# Patient Record
Sex: FEMALE | Race: WHITE | NOT HISPANIC OR LATINO | Employment: OTHER | URBAN - METROPOLITAN AREA
[De-identification: names, ages, dates, MRNs, and addresses within clinical notes are randomized per-mention and may not be internally consistent; named-entity substitution may affect disease eponyms.]

---

## 2017-08-17 ENCOUNTER — OFFICE VISIT (OUTPATIENT)
Dept: FAMILY MEDICINE | Facility: CLINIC | Age: 47
End: 2017-08-17
Payer: COMMERCIAL

## 2017-08-17 VITALS
TEMPERATURE: 98.1 F | WEIGHT: 140.7 LBS | BODY MASS INDEX: 21.32 KG/M2 | HEART RATE: 97 BPM | DIASTOLIC BLOOD PRESSURE: 87 MMHG | SYSTOLIC BLOOD PRESSURE: 141 MMHG | HEIGHT: 68 IN

## 2017-08-17 DIAGNOSIS — F41.8 SITUATIONAL ANXIETY: ICD-10-CM

## 2017-08-17 DIAGNOSIS — L70.9 ACNE, UNSPECIFIED ACNE TYPE: Primary | ICD-10-CM

## 2017-08-17 DIAGNOSIS — M25.511 ACUTE PAIN OF RIGHT SHOULDER: ICD-10-CM

## 2017-08-17 DIAGNOSIS — F43.21 GRIEF REACTION: ICD-10-CM

## 2017-08-17 DIAGNOSIS — B07.0 PLANTAR WARTS: ICD-10-CM

## 2017-08-17 DIAGNOSIS — N94.9 VAGINAL SYMPTOM: ICD-10-CM

## 2017-08-17 PROBLEM — F43.20 GRIEF REACTION: Status: ACTIVE | Noted: 2017-08-17

## 2017-08-17 LAB
SPECIMEN SOURCE: NORMAL
WET PREP SPEC: NORMAL

## 2017-08-17 PROCEDURE — 87210 SMEAR WET MOUNT SALINE/INK: CPT | Performed by: NURSE PRACTITIONER

## 2017-08-17 PROCEDURE — 17110 DESTRUCTION B9 LES UP TO 14: CPT | Performed by: NURSE PRACTITIONER

## 2017-08-17 PROCEDURE — 99215 OFFICE O/P EST HI 40 MIN: CPT | Mod: 25 | Performed by: NURSE PRACTITIONER

## 2017-08-17 RX ORDER — LORAZEPAM 0.5 MG/1
0.5 TABLET ORAL DAILY PRN
Qty: 15 TABLET | Refills: 0 | Status: SHIPPED | OUTPATIENT
Start: 2017-08-17 | End: 2018-03-06

## 2017-08-17 RX ORDER — TRETINOIN 0.25 MG/G
GEL TOPICAL
Qty: 45 G | Refills: 12 | Status: SHIPPED | OUTPATIENT
Start: 2017-08-17 | End: 2018-12-23

## 2017-08-17 ASSESSMENT — ANXIETY QUESTIONNAIRES
2. NOT BEING ABLE TO STOP OR CONTROL WORRYING: MORE THAN HALF THE DAYS
7. FEELING AFRAID AS IF SOMETHING AWFUL MIGHT HAPPEN: NEARLY EVERY DAY
5. BEING SO RESTLESS THAT IT IS HARD TO SIT STILL: SEVERAL DAYS
1. FEELING NERVOUS, ANXIOUS, OR ON EDGE: NEARLY EVERY DAY
6. BECOMING EASILY ANNOYED OR IRRITABLE: NOT AT ALL
GAD7 TOTAL SCORE: 11
3. WORRYING TOO MUCH ABOUT DIFFERENT THINGS: SEVERAL DAYS
IF YOU CHECKED OFF ANY PROBLEMS ON THIS QUESTIONNAIRE, HOW DIFFICULT HAVE THESE PROBLEMS MADE IT FOR YOU TO DO YOUR WORK, TAKE CARE OF THINGS AT HOME, OR GET ALONG WITH OTHER PEOPLE: SOMEWHAT DIFFICULT

## 2017-08-17 ASSESSMENT — PATIENT HEALTH QUESTIONNAIRE - PHQ9
5. POOR APPETITE OR OVEREATING: SEVERAL DAYS
SUM OF ALL RESPONSES TO PHQ QUESTIONS 1-9: 7

## 2017-08-17 NOTE — MR AVS SNAPSHOT
After Visit Summary   8/17/2017    Luna Rivera    MRN: [id number]           Patient Information     Date Of Birth          1970        Visit Information        Provider Department      8/17/2017 2:20 PM Catrina Jin, NP [facility]        Today's Diagnoses     Acne, unspecified acne type    -  1    Situational anxiety        Grief reaction        Vaginal symptom        Plantar warts        Acute pain of right shoulder          Care Instructions    Following treatment with liquid nitrogen - your skin may get a blister - try to keep this intact and keep the area clean and dry.  If not blister occurs, it is ok to use a pumice stone or nail file to gently file down the thickened warty tissue.   (do not use the same nail file that you use on your finger nails or it could spread the virus).   You may need to return for additional treatment if the wart does not completely resolve.  Plan to return after roughly 3 weeks once the area has healed, if still showing signs of persistent warty tissue.      May start using OTC treatments (Mediplast or Dr. Joseph) if treatment ineffective.    COURTNEY Curiel    Ativan as needed for anxiety - this medication is not intended for long term use.  It should be used as little as possible to keep symptoms controlled and it may be used occasionally at night to help with sleep.  There is a possiblity of dependency while taking this medication.  It will often be a good bridge to helping with symptoms while waiting for other long term medications to become effective.  We will hopefully need less of these as antidepressants or other long term medications become more effective.  Counseling can also be a good alternative, as they can teach relaxation techniques instead of the use of these medicatiosn.                    Generalized Anxiety Disorder  What is generalized anxiety disorder?   Generalized anxiety disorder (CARIDAD) is a condition in  "which a person worries excessively and unrealistically. They may also be jittery, restless, or dizzy. When these symptoms last for at least 6 months, a diagnosis of CARIDAD may be made.  CARIDAD may exist by itself, or with both anxiety and depression. It is estimated that almost 5% of people have had this disorder during their lives.  How does it occur?   The cause of CARIDAD is unknown. Genetic and environmental factors play a role. Women have CARIDAD about twice as often as men.  The worry in CARIDAD is not about panic attacks or being afraid in public places. It is typically \"free-floating\" anxiety out of proportion to any real life situation. The worrying can interfere with normal day-to-day activities and work or school.  What are the symptoms?   Symptoms include excessive, unrealistic, and uncontrollable worrying about many things such as:  the state of the world   the economy   violence in society   your job   the bills   chores   family members  Physical symptoms such as muscle tension, sleep problems, or feeling on edge usually go along with anxiety. A person may be short-tempered and unable to focus or concentrate because of the worrying. Other symptoms include sweating, shaking, having a very fast heartbeat, feeling out of breath, needing to go to the bathroom often and feeling like fainting. People with CARIDAD may be uneasy in a group or in a waiting room.  How is it diagnosed?   There is no lab test for CARIDAD. Your healthcare provider or therapist will ask about your symptoms. He or she will make sure you do not have a medical illness or drug or alcohol problem that could cause the symptoms. Some medicines can cause anxiety or make it worse. These include asthma medicines, stimulants, and steroids such as prednisone.  If you have had the symptoms for at least 6 months, if you have had to cut back on your activities, and if you find it difficult to get things done, you may be diagnosed with generalized anxiety disorder.  How is " it treated?   Different types of approaches have proven helpful in treating CARIDAD. These include medicine, behavior therapy, relaxation therapy, cognitive therapy, and stress management techniques. Which treatments your healthcare provider or therapist uses may depend upon how much the disorder interferes with your day-to-day life.  Several types of medicines can help treat CARIDAD. Your healthcare provider will work with you to carefully select the best one for you.  How long will the effects last?   CARIDAD can last many years and sometimes an entire lifetime.   How can I take care of myself?   Get support. Talk with family and friends. Consider joining a support group in your area. Go to a stress management class in your local community.   Learn to manage stress. Ask for help at home and work when the load is too great to handle. Find ways to relax, for example take up a hobby, listen to music, watch movies, take walks. Try deep breathing exercises when you feel stressed.   Take care of your physical health. Try to get at least 7 to 9 hours of sleep each night. Eat a healthy diet. Limit caffeine. If you smoke, quit. Avoid alcohol and drugs, because they can make your symptoms worse. Exercise according to your healthcare provider's instructions.   Check your medicines. To help prevent problems, tell your healthcare provider and pharmacist about all the medicines, natural remedies, vitamins, and other supplements that you take.   Contact your healthcare provider or therapist if you have any questions or your symptoms seem to be getting worse.  You may also want to contact [facility] (formerly the National Mental Health Association or NMHA). [facility]'s toll-free Information Center number is 9-603-242-[facility]. Its web site address is http://www.NMHA.org        Thank you for choosing [facility].  You may be receiving a survey in the mail from [employer] regarding your visit today.  Please take a few minutes to  "complete and return the survey to let us know how we are doing.      If you have questions or concerns, please contact us via [employer] or you can contact your care team at [phone].    Our Clinic hours are:  Monday 6:40 am  to 7:00 pm  Tuesday -Friday 6:40 am to 5:00 pm    The [place] outpatient lab hours are:  Monday - Friday 6:10 am to 4:45 pm  Saturdays 7:00 am to 11:00 am  Appointments are required, call [phone]    If you have clinical questions after hours or would like to schedule an appointment,  call the clinic at [phone].            Follow-ups after your visit        Additional Services     PHYSICAL THERAPY REFERRAL       *This therapy referral will be filtered to a centralized scheduling office at [facility] and the patient will receive a call to schedule an appointment at a [place] location most convenient for them. *     [facility] provides Physical Therapy evaluation and treatment and many specialty services across the [place] system.  If requesting a specialty program, please choose from the list below.    If you have not heard from the scheduling office within 2 business days, please call [phone] for all locations, with the exception of [place], please call [phone].  Treatment: Evaluation & Treatment  Special Instructions/Modalities: none  Special Programs: None    Please be aware that coverage of these services is subject to the terms and limitations of your health insurance plan.  Call member services at your health plan with any benefit or coverage questions.      **Note to Provider:  If you are referring outside of [place] for the therapy appointment, please list the name of the location in the \"special instructions\" above, print the referral and give to the patient to schedule the appointment.                  Who to contact     If you have questions or need follow up information about today's clinic visit or your schedule please " "contact [facility] directly at [phone].  Normal or non-critical lab and imaging results will be communicated to you by MyChart, letter or phone within 4 business days after the clinic has received the results. If you do not hear from us within 7 days, please contact the clinic through Fire[employer] or phone. If you have a critical or abnormal lab result, we will notify you by phone as soon as possible.  Submit refill requests through [employer] or call your pharmacy and they will forward the refill request to us. Please allow 3 business days for your refill to be completed.          Additional Information About Your Visit        [employer] Information     [employer] gives you secure access to your electronic health record. If you see a primary care provider, you can also send messages to your care team and make appointments. If you have questions, please call your primary care clinic.  If you do not have a primary care provider, please call [phone] and they will assist you.        Care EveryWhere ID     This is your Care EveryWhere ID. This could be used by other organizations to access your [place] medical records  [id number]        Your Vitals Were     Pulse Temperature Height Last Period Breastfeeding? BMI (Body Mass Index)    97 98.1  F (36.7  C) (Tympanic) 5' 7.5\" (1.715 m) 07/31/2017 No 21.71 kg/m2       Blood Pressure from Last 3 Encounters:   08/17/17 141/87   12/26/16 123/75   11/23/16 127/57    Weight from Last 3 Encounters:   08/17/17 140 lb 11.2 oz (63.8 kg)   12/26/16 140 lb (63.5 kg)   11/23/16 139 lb 6.4 oz (63.2 kg)              We Performed the Following     DESTRUCT BENIGN LESION, UP TO 14     PHYSICAL THERAPY REFERRAL     Wet prep          Today's Medication Changes          These changes are accurate as of: 8/17/17  3:09 PM.  If you have any questions, ask your nurse or doctor.               Start taking these medicines.        Dose/Directions    LORazepam 0.5 MG tablet   Commonly " known as:  ATIVAN   Used for:  Situational anxiety, Grief reaction        Dose:  0.5 mg   Take 1 tablet (0.5 mg) by mouth daily as needed for anxiety   Quantity:  15 tablet   Refills:  0         These medicines have changed or have updated prescriptions.        Dose/Directions    hydrocortisone 2.5 % cream   Commonly known as:  ANUSOL-HC   This may have changed:  additional instructions   Used for:  External hemorrhoids        Place rectally 2 times daily   Quantity:  28.35 g   Refills:  1       polyethylene glycol powder   Commonly known as:  MIRALAX   This may have changed:    - when to take this  - reasons to take this   Used for:  Chronic constipation        Dose:  1 capful   Take 17 g by mouth daily   Quantity:  510 g   Refills:  1            Where to get your medicines      These medications were sent to [facility] - [place], [place] - [address]  [address] [address]     Phone:  [phone]     tretinoin 0.025 % topical gel         Some of these will need a paper prescription and others can be bought over the counter.  Ask your nurse if you have questions.     Bring a paper prescription for each of these medications     LORazepam 0.5 MG tablet                Primary Care Provider Office Phone # Fax #    aCtrina Ioana Jin [phone] [address] [facility] [address]        Equal Access to Services     ANA PRIETO AH: Devin baxtero Soomaali, waaxda luqadaha, qaybta kaalmada adeegyada, thierry you. So [facility] [phone].    ATENCIÓN: Si habla español, tiene a lema disposición servicios gratuitos de asistencia lingüística. Llame al [phone].    We comply with applicable federal civil rights laws and [place] laws. We do not discriminate on the basis of race, color, national origin, age, disability sex, sexual orientation or gender identity.            Thank you!     Thank you for choosing [facility]  [place]  for your care. Our goal is always to provide you with excellent care. Hearing back from our patients is one way we can continue to improve our services. Please take a few minutes to complete the written survey that you may receive in the mail after your visit with us. Thank you!             Your Updated Medication List - Protect others around you: Learn how to safely use, store and throw away your medicines at www.disposemymeds.org.          This list is accurate as of: 8/17/17  3:09 PM.  Always use your most recent med list.                   Brand Name Dispense Instructions for use Diagnosis    acyclovir 400 MG tablet    ZOVIRAX    180 tablet    Take 1 tablet (400 mg) by mouth 2 times daily    Herpes simplex virus (HSV) infection       fluticasone 50 MCG/ACT spray    FLONASE    3 Bottle    Spray 2 sprays into both nostrils daily    Allergic rhinitis       hydrocortisone 2.5 % cream    ANUSOL-HC    28.35 g    Place rectally 2 times daily    External hemorrhoids       LORazepam 0.5 MG tablet    ATIVAN    15 tablet    Take 1 tablet (0.5 mg) by mouth daily as needed for anxiety    Situational anxiety, Grief reaction       polyethylene glycol powder    MIRALAX    510 g    Take 17 g by mouth daily    Chronic constipation       PRILOSEC PO      Take 20 mg by mouth daily as needed        tretinoin 0.025 % topical gel    RETIN-A    45 g    Apply  topically At Bedtime.    Acne, unspecified acne type

## 2017-08-17 NOTE — PATIENT INSTRUCTIONS
Following treatment with liquid nitrogen - your skin may get a blister - try to keep this intact and keep the area clean and dry.  If not blister occurs, it is ok to use a pumice stone or nail file to gently file down the thickened warty tissue.   (do not use the same nail file that you use on your finger nails or it could spread the virus).   You may need to return for additional treatment if the wart does not completely resolve.  Plan to return after roughly 3 weeks once the area has healed, if still showing signs of persistent warty tissue.      May start using OTC treatments (Mediplast or Dr. Joseph) if treatment ineffective.    COURTNEY Curiel    Ativan as needed for anxiety - this medication is not intended for long term use.  It should be used as little as possible to keep symptoms controlled and it may be used occasionally at night to help with sleep.  There is a possiblity of dependency while taking this medication.  It will often be a good bridge to helping with symptoms while waiting for other long term medications to become effective.  We will hopefully need less of these as antidepressants or other long term medications become more effective.  Counseling can also be a good alternative, as they can teach relaxation techniques instead of the use of these medicatiosn.                    Generalized Anxiety Disorder  What is generalized anxiety disorder?   Generalized anxiety disorder (CARIDAD) is a condition in which a person worries excessively and unrealistically. They may also be jittery, restless, or dizzy. When these symptoms last for at least 6 months, a diagnosis of CARIDAD may be made.  CARIDAD may exist by itself, or with both anxiety and depression. It is estimated that almost 5% of people have had this disorder during their lives.  How does it occur?   The cause of CARIDAD is unknown. Genetic and environmental factors play a role. Women have CARIDAD about twice as often as men.  The worry in CARIDAD is not about  "panic attacks or being afraid in public places. It is typically \"free-floating\" anxiety out of proportion to any real life situation. The worrying can interfere with normal day-to-day activities and work or school.  What are the symptoms?   Symptoms include excessive, unrealistic, and uncontrollable worrying about many things such as:  the state of the world   the economy   violence in society   your job   the bills   chores   family members  Physical symptoms such as muscle tension, sleep problems, or feeling on edge usually go along with anxiety. A person may be short-tempered and unable to focus or concentrate because of the worrying. Other symptoms include sweating, shaking, having a very fast heartbeat, feeling out of breath, needing to go to the bathroom often and feeling like fainting. People with CARIDAD may be uneasy in a group or in a waiting room.  How is it diagnosed?   There is no lab test for CARIDAD. Your healthcare provider or therapist will ask about your symptoms. He or she will make sure you do not have a medical illness or drug or alcohol problem that could cause the symptoms. Some medicines can cause anxiety or make it worse. These include asthma medicines, stimulants, and steroids such as prednisone.  If you have had the symptoms for at least 6 months, if you have had to cut back on your activities, and if you find it difficult to get things done, you may be diagnosed with generalized anxiety disorder.  How is it treated?   Different types of approaches have proven helpful in treating CARIDAD. These include medicine, behavior therapy, relaxation therapy, cognitive therapy, and stress management techniques. Which treatments your healthcare provider or therapist uses may depend upon how much the disorder interferes with your day-to-day life.  Several types of medicines can help treat CARIDAD. Your healthcare provider will work with you to carefully select the best one for you.  How long will the effects last? "   CARIDAD can last many years and sometimes an entire lifetime.   How can I take care of myself?   Get support. Talk with family and friends. Consider joining a support group in your area. Go to a stress management class in your local community.   Learn to manage stress. Ask for help at home and work when the load is too great to handle. Find ways to relax, for example take up a hobby, listen to music, watch movies, take walks. Try deep breathing exercises when you feel stressed.   Take care of your physical health. Try to get at least 7 to 9 hours of sleep each night. Eat a healthy diet. Limit caffeine. If you smoke, quit. Avoid alcohol and drugs, because they can make your symptoms worse. Exercise according to your healthcare provider's instructions.   Check your medicines. To help prevent problems, tell your healthcare provider and pharmacist about all the medicines, natural remedies, vitamins, and other supplements that you take.   Contact your healthcare provider or therapist if you have any questions or your symptoms seem to be getting worse.  You may also want to contact [facility] (formerly the National Mental Health Association or [facility]). [facility]'s toll-free Information Center number is 1-752-971-[facility]. Its web site address is [email or web address]        Thank you for choosing [facility].  You may be receiving a survey in the mail from [employer] regarding your visit today.  Please take a few minutes to complete and return the survey to let us know how we are doing.      If you have questions or concerns, please contact us via [employer] or you can contact your care team at [phone].    Our Clinic hours are:  Monday 6:40 am  to 7:00 pm  Tuesday -Friday 6:40 am to 5:00 pm    The [place] outpatient lab hours are:  Monday - Friday 6:10 am to 4:45 pm  Saturdays 7:00 am to 11:00 am  Appointments are required, call [phone]    If you have clinical questions after hours or would like to schedule an  appointment,  call the clinic at [phone].

## 2017-08-17 NOTE — NURSING NOTE
"Chief Complaint   Patient presents with     Anxiety     Vaginal Problem     painful intercourse     Medication Reconciliation     patient stopped taking Trazodone due to it causing dizziness and vomiting      Refill Request     Retin A gel      Wart     Plantars wart left foot        Initial /87 (BP Location: Left arm, Patient Position: Chair, Cuff Size: Adult Regular)  Pulse 97  Temp 98.1  F (36.7  C) (Tympanic)  Ht 5' 7.5\" (1.715 m)  Wt 140 lb 11.2 oz (63.8 kg)  LMP 07/31/2017  Breastfeeding? No  BMI 21.71 kg/m2 Estimated body mass index is 21.71 kg/(m^2) as calculated from the following:    Height as of this encounter: 5' 7.5\" (1.715 m).    Weight as of this encounter: 140 lb 11.2 oz (63.8 kg).  Medication Reconciliation: complete    "

## 2017-08-17 NOTE — PROGRESS NOTES
SUBJECTIVE:   Luna Rivera is a 46 year old female who presents to clinic today for the following health issues:     Chief Complaint   Patient presents with     Anxiety     Vaginal Problem     painful intercourse     Medication Reconciliation     patient stopped taking Trazodone due to it causing dizziness and vomiting      Refill Request     Retin A gel      Wart     Plantars wart left foot        Anxiety Follow-Up    Status since last visit: Worsened -situational    Other associated symptoms:issues with sleeping and concentration     Complicating factors:   Significant life event: Yes-Her cousin who was her best friend passed away . 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 Dealing with family issues    Current substance abuse: None  Depression symptoms: No  CARIDAD-7 SCORE 2011   Total Score 0   Some recent data might be hidden       GAD7  Cousin  recent and is executor of will and having family issues with cousin's family.  Lot's of panic episodes lately  History of anxiety - not on medications currently.    No suicidal thoughts.  No substance abuse.        Amount of exercise or physical activity: 6-7 days/week for an average of 15-30 minutes    Problems taking medications regularly: No    Medication side effects: stopped trazodone due to it causing dizziness and vomiting   Diet: regular (no restrictions)    Vaginal Symptoms  Onset: x 1 year gradually getting worse     Description:  Pain with intercourse. Patient had a very sore cyst on the opening of her vagina, is now moslty gone, but she wants this checked     Accompanying Signs & Symptoms:  None     History:   Sexually active: YES  New Partner: no   Possibility of Pregnancy:  No    Precipitating factors:   Recent Antibiotic Use: no     Alleviating factors:  None     Therapies Tried and outcome: none     Vaginal tenderness.  NO abdominal pain.    No vaginal discharge.    In monogamous relationship with .  No concerns for  "STDs        WART(S)      Onset: x 1 week     Description (location/number): Plantars wart bottom of Left foot     Accompanying signs and symptoms: Painful: no    History: prior warts: YES- when she was a child     Therapies tried and outcome: \"vinegar\" which has softened the wart up.         Problem list and histories reviewed & adjusted, as indicated.  Additional history: as documented    Patient Active Problem List   Diagnosis     Allergic rhinitis     Herpes simplex virus (HSV) infection     Hemorrhoids     Family history of breast cancer in mother     Menorrhagia     CARDIOVASCULAR SCREENING; LDL GOAL LESS THAN 160     Acne     External hemorrhoids     Hip impingement syndrome, unspecified laterality     Gastroesophageal reflux disease without esophagitis     Ptosis of eyelid, bilateral     Situational anxiety     Grief reaction     Past Surgical History:   Procedure Laterality Date     ARTHROSCOPY KNEE WITH MEDIAL MENISCECTOMY  2014    Procedure: ARTHROSCOPY KNEE WITH MEDIAL MENISCECTOMY;  Surgeon: Alejandro Dodge MD;  Location: [facility]       DELIVERY ONLY  ,      C LIGATE FALLOPIAN TUBE,POSTPARTUM       ESOPHAGOSCOPY, GASTROSCOPY, DUODENOSCOPY (EGD), COMBINED N/A 2016    Procedure: COMBINED ESOPHAGOSCOPY, GASTROSCOPY, DUODENOSCOPY (EGD), BIOPSY SINGLE OR MULTIPLE;  Surgeon: Jose Mora MD;  Location: [facility]      HYSTEROSCOPY, SURGICAL; W/ ENDOMETRIAL ABLATION, ANY METHOD  3/30/10    Novasure ablation       Social History   Substance Use Topics     Smoking status: Never Smoker     Smokeless tobacco: Never Used     Alcohol use Yes      Comment: rarely     Family History   Problem Relation Age of Onset     Breast Cancer Mother      age 46     CANCER Mother      Throat, larynx (d/t radiation from breast cancer)tongue cancer 2011     Hypertension Father      Neurologic Disorder Maternal Grandmother      Parkinson's     C.A.D. Maternal Grandmother      Thyroid Disease " Maternal Grandmother      hyperthyroid     Arthritis Maternal Grandfather      HEART DISEASE Maternal Grandfather      OSTEOPOROSIS Paternal Grandmother      HEART DISEASE Paternal Grandfather      CANCER Father      lung CA     Cancer - colorectal No family hx of      Prostate Cancer No family hx of      CEREBROVASCULAR DISEASE No family hx of      DIABETES No family hx of      Asthma No family hx of          Current Outpatient Prescriptions   Medication Sig Dispense Refill     tretinoin (RETIN-A) 0.025 % topical gel Apply  topically At Bedtime. 45 g 12     LORazepam (ATIVAN) 0.5 MG tablet Take 1 tablet (0.5 mg) by mouth daily as needed for anxiety 15 tablet 0     Omeprazole (PRILOSEC PO) Take 20 mg by mouth daily as needed        fluticasone (FLONASE) 50 MCG/ACT spray Spray 2 sprays into both nostrils daily 3 Bottle 1     acyclovir (ZOVIRAX) 400 MG tablet Take 1 tablet (400 mg) by mouth 2 times daily 180 tablet 2     hydrocortisone (ANUSOL-HC) 2.5 % rectal cream Place rectally 2 times daily (Patient taking differently: Place rectally 2 times daily As needed) 28.35 g 1     polyethylene glycol (MIRALAX) powder Take 17 g by mouth daily (Patient taking differently: Take 1 capful by mouth daily as needed ) 510 g 1     Allergies   Allergen Reactions     Compazine Fatigue     Neurological s/s-can't wake up     Ancef [Cefazolin Sodium] Hives     Recent Labs   Lab Test  10/24/16   1214  05/06/15   0858  05/22/14   1056  02/27/13   0945  01/04/11   1003   LDL   --    --   92  134*  114   HDL   --    --   69  63  59   TRIG   --    --   72  63  91   ALT   --   18   --    --    --    CR  0.84  0.84   --    --    --    GFRESTIMATED  73  73   --    --    --    GFRESTBLACK  88  88   --    --    --    POTASSIUM  4.1  3.9   --    --    --    TSH  1.71   --   2.09   --    --       BP Readings from Last 3 Encounters:   08/17/17 141/87   12/26/16 123/75   11/23/16 127/57    Wt Readings from Last 3 Encounters:   08/17/17 140 lb 11.2 oz  "(63.8 kg)   12/26/16 140 lb (63.5 kg)   11/23/16 139 lb 6.4 oz (63.2 kg)                  Labs reviewed in EPIC          Reviewed and updated as needed this visit by clinical staffAllergies  Meds       Reviewed and updated as needed this visit by Provider         ROS:  Constitutional, HEENT, cardiovascular, pulmonary, GI, , musculoskeletal, neuro, skin, endocrine and psych systems are negative, except as otherwise noted.  Reports right shoulder pain with certain movements.  No limits in ROM, weakness or numbness in UE.      OBJECTIVE:   /87 (BP Location: Left arm, Patient Position: Chair, Cuff Size: Adult Regular)  Pulse 97  Temp 98.1  F (36.7  C) (Tympanic)  Ht 5' 7.5\" (1.715 m)  Wt 140 lb 11.2 oz (63.8 kg)  LMP 07/31/2017  Breastfeeding? No  BMI 21.71 kg/m2  Body mass index is 21.71 kg/(m^2).  GENERAL: healthy, alert and no distress  NECK: no adenopathy, no asymmetry, masses, or scars and thyroid normal to palpation  RESP: lungs clear to auscultation - no rales, rhonchi or wheezes  CV: regular rate and rhythm, normal S1 S2, no S3 or S4, no murmur, click or rub, no peripheral edema and peripheral pulses strong  ABDOMEN: soft, nontender, no hepatosplenomegaly, no masses and bowel sounds normal   (female): normal female external genitalia, normal urethral meatus , vaginal mucosa pink, moist, well rugated, vaginal skin findings: small skin tag on lower vaginal opening - no ulcerations and with mild erythema at vaginal opening - no excoriation and normal cervix, adnexae, and uterus without masses.  MS: RUE exam shows normal strength and muscle mass, no deformities and no erythema, induration, or nodules  SKIN: After a complete discussion of the multiple treatment options for warts including the risks and bennefits of each, the patient has decided on treatment with Liquid nitrogen.    Each wart was frozen easily three times with liquid nitrogen. .  A total of 1 warts on left foot are treated today.  "     PSYCH: mentation appears normal, affect normal/bright, anxious, fatigued, judgement and insight intact and appearance well groomed    Diagnostic Test Results:  Results for orders placed or performed in visit on 08/17/17   Wet prep   Result Value Ref Range    Specimen Description Vagina     Wet Prep No Trichomonas seen     Wet Prep No clue cells seen     Wet Prep No yeast seen          ASSESSMENT/PLAN:     1. Situational anxiety   Discussed daily medication - patient would like to hold off for now and will follow up if anxiety not improving.   Discussed Ativan and uses - needs visit for refills.    - LORazepam (ATIVAN) 0.5 MG tablet; Take 1 tablet (0.5 mg) by mouth daily as needed for anxiety  Dispense: 15 tablet; Refill: 0    2. Grief reaction     - LORazepam (ATIVAN) 0.5 MG tablet; Take 1 tablet (0.5 mg) by mouth daily as needed for anxiety  Dispense: 15 tablet; Refill: 0    3. Acne, unspecified acne type     - tretinoin (RETIN-A) 0.025 % topical gel; Apply  topically At Bedtime.  Dispense: 45 g; Refill: 12    4. Vaginal symptom   Wet prep negative.  Recommend warm soaks.    - Wet prep    5. Plantar warts   LN applied X 3     - DESTRUCT BENIGN LESION, UP TO 14    6. Acute pain of right shoulder     - PHYSICAL THERAPY REFERRAL    Patient Instructions   Following treatment with liquid nitrogen - your skin may get a blister - try to keep this intact and keep the area clean and dry.  If not blister occurs, it is ok to use a pumice stone or nail file to gently file down the thickened warty tissue.   (do not use the same nail file that you use on your finger nails or it could spread the virus).   You may need to return for additional treatment if the wart does not completely resolve.  Plan to return after roughly 3 weeks once the area has healed, if still showing signs of persistent warty tissue.      May start using OTC treatments (Mediplast or Dr. Joseph) if treatment ineffective.    COURTNEY Curiel    Ativan  "as needed for anxiety - this medication is not intended for long term use.  It should be used as little as possible to keep symptoms controlled and it may be used occasionally at night to help with sleep.  There is a possiblity of dependency while taking this medication.  It will often be a good bridge to helping with symptoms while waiting for other long term medications to become effective.  We will hopefully need less of these as antidepressants or other long term medications become more effective.  Counseling can also be a good alternative, as they can teach relaxation techniques instead of the use of these medicatiosn.                    Generalized Anxiety Disorder  What is generalized anxiety disorder?   Generalized anxiety disorder (CARIDAD) is a condition in which a person worries excessively and unrealistically. They may also be jittery, restless, or dizzy. When these symptoms last for at least 6 months, a diagnosis of CARIDAD may be made.  CARIDAD may exist by itself, or with both anxiety and depression. It is estimated that almost 5% of people have had this disorder during their lives.  How does it occur?   The cause of CARIDAD is unknown. Genetic and environmental factors play a role. Women have CARIDAD about twice as often as men.  The worry in CARIDAD is not about panic attacks or being afraid in public places. It is typically \"free-floating\" anxiety out of proportion to any real life situation. The worrying can interfere with normal day-to-day activities and work or school.  What are the symptoms?   Symptoms include excessive, unrealistic, and uncontrollable worrying about many things such as:  the state of the world   the economy   violence in society   your job   the bills   chores   family members  Physical symptoms such as muscle tension, sleep problems, or feeling on edge usually go along with anxiety. A person may be short-tempered and unable to focus or concentrate because of the worrying. Other symptoms include " sweating, shaking, having a very fast heartbeat, feeling out of breath, needing to go to the bathroom often and feeling like fainting. People with CARIDAD may be uneasy in a group or in a waiting room.  How is it diagnosed?   There is no lab test for CARIDAD. Your healthcare provider or therapist will ask about your symptoms. He or she will make sure you do not have a medical illness or drug or alcohol problem that could cause the symptoms. Some medicines can cause anxiety or make it worse. These include asthma medicines, stimulants, and steroids such as prednisone.  If you have had the symptoms for at least 6 months, if you have had to cut back on your activities, and if you find it difficult to get things done, you may be diagnosed with generalized anxiety disorder.  How is it treated?   Different types of approaches have proven helpful in treating CARIDAD. These include medicine, behavior therapy, relaxation therapy, cognitive therapy, and stress management techniques. Which treatments your healthcare provider or therapist uses may depend upon how much the disorder interferes with your day-to-day life.  Several types of medicines can help treat CARIDAD. Your healthcare provider will work with you to carefully select the best one for you.  How long will the effects last?   CARIDAD can last many years and sometimes an entire lifetime.   How can I take care of myself?   Get support. Talk with family and friends. Consider joining a support group in your area. Go to a stress management class in your local community.   Learn to manage stress. Ask for help at home and work when the load is too great to handle. Find ways to relax, for example take up a hobby, listen to music, watch movies, take walks. Try deep breathing exercises when you feel stressed.   Take care of your physical health. Try to get at least 7 to 9 hours of sleep each night. Eat a healthy diet. Limit caffeine. If you smoke, quit. Avoid alcohol and drugs, because they can  make your symptoms worse. Exercise according to your healthcare provider's instructions.   Check your medicines. To help prevent problems, tell your healthcare provider and pharmacist about all the medicines, natural remedies, vitamins, and other supplements that you take.   Contact your healthcare provider or therapist if you have any questions or your symptoms seem to be getting worse.  You may also want to contact [facility] (formerly the National Mental Health Association or [facility]). [facility]'s toll-free Information Center number is 1-838-285-[facility]. Its web site address is [email or web address]        Thank you for choosing [facility].  You may be receiving a survey in the mail from [employer] regarding your visit today.  Please take a few minutes to complete and return the survey to let us know how we are doing.      If you have questions or concerns, please contact us via [employer] or you can contact your care team at [phone].    Our Clinic hours are:  Monday 6:40 am  to 7:00 pm  Tuesday -Friday 6:40 am to 5:00 pm    The [place] outpatient lab hours are:  Monday - Friday 6:10 am to 4:45 pm  Saturdays 7:00 am to 11:00 am  Appointments are required, call [phone]    If you have clinical questions after hours or would like to schedule an appointment,  call the clinic at [phone].        Catrina Jin NP  [facility]      Total times spent with patient 40 minutes of which > 50% of the time was spent counseling and coordination of care discussion of above symptoms, medication options, Ativan risks and SE, treatment options and follow up

## 2017-08-18 ASSESSMENT — ANXIETY QUESTIONNAIRES: GAD7 TOTAL SCORE: 11

## 2017-09-07 ENCOUNTER — HOSPITAL ENCOUNTER (OUTPATIENT)
Dept: PHYSICAL THERAPY | Facility: CLINIC | Age: 47
Setting detail: THERAPIES SERIES
End: 2017-09-07
Attending: NURSE PRACTITIONER
Payer: COMMERCIAL

## 2017-09-07 PROCEDURE — 97161 PT EVAL LOW COMPLEX 20 MIN: CPT | Mod: GP | Performed by: PHYSICAL THERAPIST

## 2017-09-07 PROCEDURE — 97110 THERAPEUTIC EXERCISES: CPT | Mod: GP | Performed by: PHYSICAL THERAPIST

## 2017-09-07 PROCEDURE — 97035 APP MDLTY 1+ULTRASOUND EA 15: CPT | Mod: GP | Performed by: PHYSICAL THERAPIST

## 2017-09-07 PROCEDURE — 40000718 ZZHC STATISTIC PT DEPARTMENT ORTHO VISIT: Performed by: PHYSICAL THERAPIST

## 2017-09-07 NOTE — PROGRESS NOTES
09/07/17 0900   General Information   Type of Visit Initial OP Ortho PT Evaluation   Start of Care Date 09/07/17   Referring Physician Catrina Jin    Patient/Family Goals Statement Decrease P, full movement and strength. Reaching up, Reaching across.    Orders Evaluate and Treat   Date of Order 08/17/17   Insurance Type [employer]   Insurance Comments/Visits Authorized Auth'd    Medical Diagnosis Acute P of R Shoulder    Surgical/Medical history reviewed (B Hip Surgery for Labral tears, bone scraping. L Meniscus, )   Precautions/Limitations no known precautions/limitations   General Information Comments Takes Tylenol, Advil as needed.    Body Part(s)   Body Part(s) Shoulder   Presentation and Etiology   Pertinent history of current problem (include personal factors and/or comorbidities that impact the POC) R Shoulder has been hurting for 6 months. Has been irritating it on/off. No specific injury. Last winter was walking dog and fell on ice really hard, thinks that she may have caught herself w/R arm. Is feeling better.    Impairments A. Pain;D. Decreased ROM;E. Decreased flexibility;F. Decreased strength and endurance   Functional Limitations perform required work activities;perform desired leisure / sports activities   Symptom Location P on top of shoulder w/ reaching up. Reaching across get P and tightness Post/Lat shoulder.    How/Where did it occur From insidious onset  (Possibley from a fall. )   Onset date of current episode/exacerbation 08/17/17  (MD Visit date. )   Chronicity Chronic   Pain rating (0-10 point scale) (1-5/10 )   Pain quality A. Sharp;B. Dull;C. Aching   Frequency of pain/symptoms C. With activity   Pain/symptoms are: (End of day )   Pain/symptoms exacerbated by C. Lifting;D. Carrying;H. Overhead reach;K. Home tasks   Pain/symptoms eased by C. Rest;G. Heat;H. Cold;I. OTC medication(s)   Progression of symptoms since onset: Improved   Prior Level of Function   Functional Level Prior  Comment Currently Independent w/ ADL's.    Current Level of Function   Current Community Support Family/friend caregiver   Patient role/employment history A. Employed   Employment Comments Computer work.    Living environment [place]   Fall Risk Screen   Fall screen completed by PT   Per patient - Fall 2 or more times in past year? No   Per patient - Fall with injury in past year? No   Is patient a fall risk? No   System Outcome Measures   Outcome Measures (SPADI 33.85% )   Functional Scales   Functional Scales Patient Specific Functional Scale   Patient Specific Functional Scale Q1:;Q2:;Q3:   Q1: Place an object on a high shelf 4/10    Q2: Carrying heavy object 4/10   Q3: Washing hair or back 3/10   Ankle/Foot Objective Findings   Posture Normal   Shoulder Objective Findings   Observation No acute distress.    Integumentary  Normal    Posture Good    Cervical Screen (ROM, quadrant) R Quadrant P top of R shoudler   Shoulder ROM Comment AROM R Flex 155 w/P, Abd 150 w/P, ER Normal, IR R T 12 w/P, L T3.    Scapulothoracic Rhythm Normal    Pec Minor (supine) Flexibility Tight B    Shoulder Flexibility Comments R UTrap and Lev tight.    Neer's Test + w/ Flex and Abd   Garcia-Sam Test Positive    Coracoid Test Negative    Bursa Test Negative    Phillips's Test Weak w/ Supination    Sulcus Test Painful.    Crossover Test Positive    Shoulder Special Tests Comments Bicipital Tendonitis. MMT: Abd at 90 4 w/ P, IR 5-.    Palpation R Biceps Tendon, Teres Major, Coracoid process, Slightly tender over R Pectorals.    Planned Therapy Interventions   Planned Therapy Interventions Comment STM, DTFM, Isometrics, progressing to Weights. ROM if needed.    Planned Modality Interventions   Planned Modality Interventions Comments Non thermal US, Iontophoresis prn, Infrafred.    Clinical Impression   Criteria for Skilled Therapeutic Interventions Met yes, treatment indicated   PT Diagnosis Possible SLAP, Biceps tendonitis,  Slight impingement.    Influenced by the following impairments Pain, Decreased Strength    Functional limitations due to impairments HH duties, Sports    Clinical Presentation Evolving/Changing   Clinical Presentation Rationale SPADI, ROM, MMT, Positive NEER's and K-H tests.    Clinical Decision Making (Complexity) Low complexity   Therapy Frequency 1 time/week   Predicted Duration of Therapy Intervention (days/wks) 1 month, then reassess, 6 visits.    Risk & Benefits of therapy have been explained Yes   Patient, Family & other staff in agreement with plan of care Yes   Clinical Impression Comments Possible SLAP, Biceps tendonitis, Slight impingement.    Education Assessment   Preferred Learning Style Listening;Demonstration;Pictures/video   Barriers to Learning No barriers   ORTHO GOALS   PT Ortho Eval Goals 1;2;3;4   Ortho Goal 1   Goal Identifier 1   Goal Description STG: Place an object on a high shelf 2/10    Target Date 10/18/17   Ortho Goal 2   Goal Identifier 2   Goal Description STG: Carrying heavy object 2/10   Target Date 10/18/17   Ortho Goal 3   Goal Identifier 3   Goal Description STG: Washing hair or back 1/10   Target Date 10/18/17   Ortho Goal 4   Goal Identifier 5   Goal Description LTG: Pt will be independnet w/ HEP to manage R shoulder weakness, P for better function and use of R U/E.   Target Date 11/10/17   Total Evaluation Time   Total Evaluation Time 60 Total (Eval x 40, Ex x 10, US x 10)    Cristiana Smith PT, [facility] (#[id number])  [facility]           [phone]  Fax          [phone]  Appt #      [phone]

## 2017-09-08 DIAGNOSIS — B00.9 HERPES SIMPLEX VIRUS (HSV) INFECTION: ICD-10-CM

## 2017-09-08 NOTE — TELEPHONE ENCOUNTER
Acyclovir     Last Written Prescription Date: 03/16/2016  Last Fill Quantity: 180, # refills: 2  Last Office Visit with [facility], P or [facility] prescribing provider: 08/17/2017        Creatinine   Date Value Ref Range Status   10/24/2016 0.84 0.52 - 1.04 mg/dL Final     Miles ASTORGA (R)

## 2017-09-13 ENCOUNTER — HOSPITAL ENCOUNTER (OUTPATIENT)
Dept: PHYSICAL THERAPY | Facility: CLINIC | Age: 47
Setting detail: THERAPIES SERIES
End: 2017-09-13
Attending: NURSE PRACTITIONER
Payer: COMMERCIAL

## 2017-09-13 PROCEDURE — 97035 APP MDLTY 1+ULTRASOUND EA 15: CPT | Mod: GP | Performed by: PHYSICAL THERAPIST

## 2017-09-13 PROCEDURE — 40000718 ZZHC STATISTIC PT DEPARTMENT ORTHO VISIT: Performed by: PHYSICAL THERAPIST

## 2017-09-13 PROCEDURE — 97110 THERAPEUTIC EXERCISES: CPT | Mod: GP | Performed by: PHYSICAL THERAPIST

## 2017-09-13 PROCEDURE — 97140 MANUAL THERAPY 1/> REGIONS: CPT | Mod: GP | Performed by: PHYSICAL THERAPIST

## 2017-09-15 RX ORDER — ACYCLOVIR 400 MG/1
TABLET ORAL
Qty: 180 TABLET | Refills: 0 | Status: SHIPPED | OUTPATIENT
Start: 2017-09-15 | End: 2018-03-22

## 2017-09-15 NOTE — TELEPHONE ENCOUNTER
Prescription approved per [facility] Refill Protocol.    Chantale Capellan RN

## 2017-09-20 ENCOUNTER — HOSPITAL ENCOUNTER (OUTPATIENT)
Dept: PHYSICAL THERAPY | Facility: CLINIC | Age: 47
Setting detail: THERAPIES SERIES
End: 2017-09-20
Attending: NURSE PRACTITIONER
Payer: COMMERCIAL

## 2017-09-20 PROCEDURE — 97035 APP MDLTY 1+ULTRASOUND EA 15: CPT | Mod: GP | Performed by: PHYSICAL THERAPIST

## 2017-09-20 PROCEDURE — 40000718 ZZHC STATISTIC PT DEPARTMENT ORTHO VISIT: Performed by: PHYSICAL THERAPIST

## 2017-09-20 PROCEDURE — 97140 MANUAL THERAPY 1/> REGIONS: CPT | Mod: GP | Performed by: PHYSICAL THERAPIST

## 2017-09-27 ENCOUNTER — HOSPITAL ENCOUNTER (OUTPATIENT)
Dept: PHYSICAL THERAPY | Facility: CLINIC | Age: 47
Setting detail: THERAPIES SERIES
End: 2017-09-27
Attending: NURSE PRACTITIONER
Payer: COMMERCIAL

## 2017-09-27 PROCEDURE — 97035 APP MDLTY 1+ULTRASOUND EA 15: CPT | Mod: GP | Performed by: PHYSICAL THERAPIST

## 2017-09-27 PROCEDURE — 40000718 ZZHC STATISTIC PT DEPARTMENT ORTHO VISIT: Performed by: PHYSICAL THERAPIST

## 2017-09-27 PROCEDURE — 97140 MANUAL THERAPY 1/> REGIONS: CPT | Mod: GP | Performed by: PHYSICAL THERAPIST

## 2017-10-04 ENCOUNTER — HOSPITAL ENCOUNTER (OUTPATIENT)
Dept: PHYSICAL THERAPY | Facility: CLINIC | Age: 47
Setting detail: THERAPIES SERIES
End: 2017-10-04
Attending: NURSE PRACTITIONER
Payer: COMMERCIAL

## 2017-10-04 PROCEDURE — 97140 MANUAL THERAPY 1/> REGIONS: CPT | Mod: GP | Performed by: PHYSICAL THERAPIST

## 2017-10-04 PROCEDURE — 40000718 ZZHC STATISTIC PT DEPARTMENT ORTHO VISIT: Performed by: PHYSICAL THERAPIST

## 2017-10-04 PROCEDURE — 97035 APP MDLTY 1+ULTRASOUND EA 15: CPT | Mod: GP | Performed by: PHYSICAL THERAPIST

## 2017-10-11 ENCOUNTER — HOSPITAL ENCOUNTER (OUTPATIENT)
Dept: PHYSICAL THERAPY | Facility: CLINIC | Age: 47
Setting detail: THERAPIES SERIES
End: 2017-10-11
Attending: NURSE PRACTITIONER
Payer: COMMERCIAL

## 2017-10-11 PROCEDURE — 97140 MANUAL THERAPY 1/> REGIONS: CPT | Mod: GP | Performed by: PHYSICAL THERAPIST

## 2017-10-11 PROCEDURE — 40000718 ZZHC STATISTIC PT DEPARTMENT ORTHO VISIT: Performed by: PHYSICAL THERAPIST

## 2017-10-11 PROCEDURE — 97110 THERAPEUTIC EXERCISES: CPT | Mod: GP | Performed by: PHYSICAL THERAPIST

## 2017-10-18 ENCOUNTER — HOSPITAL ENCOUNTER (OUTPATIENT)
Dept: PHYSICAL THERAPY | Facility: CLINIC | Age: 47
Setting detail: THERAPIES SERIES
End: 2017-10-18
Attending: NURSE PRACTITIONER
Payer: COMMERCIAL

## 2017-10-18 PROCEDURE — 97035 APP MDLTY 1+ULTRASOUND EA 15: CPT | Mod: GP | Performed by: PHYSICAL THERAPIST

## 2017-10-18 PROCEDURE — 40000718 ZZHC STATISTIC PT DEPARTMENT ORTHO VISIT: Performed by: PHYSICAL THERAPIST

## 2017-10-18 PROCEDURE — 97140 MANUAL THERAPY 1/> REGIONS: CPT | Mod: GP | Performed by: PHYSICAL THERAPIST

## 2017-10-18 NOTE — PROGRESS NOTES
Outpatient Physical Therapy Progress Note     Patient: Luna Rivera  : 1970    Beginning/End Dates of Reporting Period:   to 10/18/2017.  Total # of rx sessions: 7    Referring Provider: Catrina Jin Diagnosis: Acute P of R shoulder.      Client Self Report:  P Scale: 2-3/10, before the weekend. Shoulder sore from lifting heavier over the weekend. Before the weekend it was getting better. Realy notice it w/ reaching into back seat to get purse. Will need to carry heavier things now that traveling through airports, etc.  Going to be out of town, returning 17. Burning between shoulder blades today.     Objective Measurements:  Objective Measure: SPADI   Details: 10/18/17  23/.     INITIALLY: 33.85%     Objective Measure: AROM  Details: 10/18/17 Not measured d/t time constraints today.   INITIALLY: R Flex 155 w/ P, Abd 150 w/ P, IR T12 w/P, ER NOrmal     Objective Measure: MMT  Details: 10/18/17 Not measured d/t time constraints today.     INITIALLY:Abd at 90 4 w/P; IR 5-     Objective Measure: Special Tests  Details:  NOT checked today d/t time constraints.  INITIALLY: Bicip Tendonitis, NEER's w/ Flex and Abd, K-H, O-Alexander's Weak w/ Supination, Sulcus, Crossover.      Goals:  Goal Identifier 1   Goal Description STG: Place an object on a high shelf 2/10  10/18/17  Rated /10, not met.    Target Date 10/18/17   Date Met      Progress:     Goal Identifier 2   Goal Description STG: Carrying heavy object 2/10 10/18/17 Not trying to carry heavy things right now.    Target Date 10/18/17   Date Met      Progress:     Goal Identifier 3   Goal Description STG: Washing hair 1/10.  10/18/17 MET today.    Target Date 10/18/17   Date Met  10/18/17   Progress:     Goal Identifier 4   Goal Description LTG: Pt will be independnet w/ HEP to manage R shoulder weakness, P for better function and use of R U/E.   Target Date 11/10/17   Date Met      Progress:     Progress Toward Goals:    Progress this reporting period: Pt has met 1 STG.   Progress limited due to Increased activity over the weekend before reassessment.     Plan:  Continue therapy per current plan of care.  Changes to goals: New goal for washing back.     Discharge:  No  Cristiana Smith, PT, [facility] (#[id number])  [facility]           [phone]  Fax          [phone]  Appt #      [phone]

## 2017-12-07 ENCOUNTER — OFFICE VISIT (OUTPATIENT)
Dept: FAMILY MEDICINE | Facility: CLINIC | Age: 47
End: 2017-12-07
Payer: COMMERCIAL

## 2017-12-07 VITALS
BODY MASS INDEX: 21.82 KG/M2 | WEIGHT: 144 LBS | TEMPERATURE: 98.3 F | SYSTOLIC BLOOD PRESSURE: 112 MMHG | DIASTOLIC BLOOD PRESSURE: 74 MMHG | HEART RATE: 90 BPM | HEIGHT: 68 IN

## 2017-12-07 DIAGNOSIS — B07.0 PLANTAR WARTS: Primary | ICD-10-CM

## 2017-12-07 DIAGNOSIS — Z23 ENCOUNTER FOR IMMUNIZATION: ICD-10-CM

## 2017-12-07 PROCEDURE — 90715 TDAP VACCINE 7 YRS/> IM: CPT | Performed by: NURSE PRACTITIONER

## 2017-12-07 PROCEDURE — 17110 DESTRUCTION B9 LES UP TO 14: CPT | Performed by: NURSE PRACTITIONER

## 2017-12-07 PROCEDURE — 90471 IMMUNIZATION ADMIN: CPT | Performed by: NURSE PRACTITIONER

## 2017-12-07 NOTE — PROGRESS NOTES
SUBJECTIVE:   Luna Rivera is a 47 year old female who presents to clinic today for the following health issues:    WART(S)      Onset: ongoing since August     Description (location/number): She has a wart on the bottom of her left foot.     Accompanying signs and symptoms: Painful: YES    History: prior warts: YES    Therapies tried and outcome: She has tried OTC home freeze, she picks at the wart, Apple Cider Vinegar.     Reports pain in bottom of the foot with walking.  Has not had LN done on foot before.    Problem list and histories reviewed & adjusted, as indicated.  Additional history: as documented    Patient Active Problem List   Diagnosis     Allergic rhinitis     Herpes simplex virus (HSV) infection     Hemorrhoids     Family history of breast cancer in mother     Menorrhagia     CARDIOVASCULAR SCREENING; LDL GOAL LESS THAN 160     Acne     External hemorrhoids     Hip impingement syndrome, unspecified laterality     Gastroesophageal reflux disease without esophagitis     Ptosis of eyelid, bilateral     Situational anxiety     Grief reaction     Past Surgical History:   Procedure Laterality Date     ARTHROSCOPY KNEE WITH MEDIAL MENISCECTOMY  2014    Procedure: ARTHROSCOPY KNEE WITH MEDIAL MENISCECTOMY;  Surgeon: Alejandro Dodge MD;  Location: [facility]  DELIVERY ONLY  ,      C LIGATE FALLOPIAN TUBE,POSTPARTUM       ESOPHAGOSCOPY, GASTROSCOPY, DUODENOSCOPY (EGD), COMBINED N/A 2016    Procedure: COMBINED ESOPHAGOSCOPY, GASTROSCOPY, DUODENOSCOPY (EGD), BIOPSY SINGLE OR MULTIPLE;  Surgeon: Jose Mora MD;  Location: [facility] HYSTEROSCOPY, SURGICAL; W/ ENDOMETRIAL ABLATION, ANY METHOD  3/30/10    Novasure ablation       Social History   Substance Use Topics     Smoking status: Never Smoker     Smokeless tobacco: Never Used     Alcohol use Yes      Comment: rarely     Family History   Problem Relation Age of Onset     Breast Cancer Mother      age 46      CANCER Mother      Throat, larynx (d/t radiation from breast cancer)tongue cancer 9/2011     Hypertension Father      CANCER Father      lung CA     Neurologic Disorder Maternal Grandmother      Parkinson's     C.A.D. Maternal Grandmother      Thyroid Disease Maternal Grandmother      hyperthyroid     Arthritis Maternal Grandfather      HEART DISEASE Maternal Grandfather      OSTEOPOROSIS Paternal Grandmother      HEART DISEASE Paternal Grandfather      Cancer - colorectal No family hx of      Prostate Cancer No family hx of      CEREBROVASCULAR DISEASE No family hx of      DIABETES No family hx of      Asthma No family hx of          Current Outpatient Prescriptions   Medication Sig Dispense Refill     acyclovir (ZOVIRAX) 400 MG tablet TAKE ONE TABLET BY MOUTH TWICE DAILY 180 tablet 0     tretinoin (RETIN-A) 0.025 % topical gel Apply  topically At Bedtime. 45 g 12     LORazepam (ATIVAN) 0.5 MG tablet Take 1 tablet (0.5 mg) by mouth daily as needed for anxiety 15 tablet 0     Omeprazole (PRILOSEC PO) Take 20 mg by mouth daily as needed        fluticasone (FLONASE) 50 MCG/ACT spray Spray 2 sprays into both nostrils daily 3 Bottle 1     hydrocortisone (ANUSOL-HC) 2.5 % rectal cream Place rectally 2 times daily (Patient taking differently: Place rectally 2 times daily As needed) 28.35 g 1     polyethylene glycol (MIRALAX) powder Take 17 g by mouth daily (Patient taking differently: Take 1 capful by mouth daily as needed ) 510 g 1     Allergies   Allergen Reactions     Compazine Fatigue     Neurological s/s-can't wake up     Ancef [Cefazolin Sodium] Hives     Recent Labs   Lab Test  10/24/16   1214  05/06/15   0858  05/22/14   1056  02/27/13   0945  01/04/11   1003   LDL   --    --   92  134*  114   HDL   --    --   69  63  59   TRIG   --    --   72  63  91   ALT   --   18   --    --    --    CR  0.84  0.84   --    --    --    GFRESTIMATED  73  73   --    --    --    GFRESTBLACK  88  88   --    --    --    POTASSIUM   "4.1  3.9   --    --    --    TSH  1.71   --   2.09   --    --       BP Readings from Last 3 Encounters:   12/07/17 112/74   08/17/17 141/87   12/26/16 123/75    Wt Readings from Last 3 Encounters:   12/07/17 144 lb (65.3 kg)   08/17/17 140 lb 11.2 oz (63.8 kg)   12/26/16 140 lb (63.5 kg)                  Labs reviewed in EPIC          Reviewed and updated as needed this visit by clinical staff     Reviewed and updated as needed this visit by Provider         ROS:  Constitutional, HEENT, cardiovascular, pulmonary, GI, , musculoskeletal, neuro, skin, endocrine and psych systems are negative, except as otherwise noted.      OBJECTIVE:   /74  Pulse 90  Temp 98.3  F (36.8  C) (Tympanic)  Ht 5' 7.5\" (1.715 m)  Wt 144 lb (65.3 kg)  Breastfeeding? No  BMI 22.22 kg/m2  Body mass index is 22.22 kg/(m^2).  GENERAL: healthy, alert and no distress  SKIN: no suspicious lesions or rashes.  Left foot palmar region with warts present.  After a complete discussion of the multiple treatment options for warts including the risks and bennefits of each, the patient has decided on treatment with Liquid nitrogen.   Each wart was frozen easily three times with liquid nitrogen. .  A total of 1 warts are treated today.        Diagnostic Test Results:  none     ASSESSMENT/PLAN:     1. Plantar warts     Discussed treatment options today - patient consented to LN.  The risks, benefits and treatment options of prescribed medications or other treatments have been discussed with the patient. The patient verbalized their understanding and should call or follow up if no improvement or if they develop further problems.        - DESTRUCT BENIGN LESION, UP TO 14    Patient Instructions   Following treatment with liquid nitrogen - your skin may get a blister - try to keep this intact and keep the area clean and dry.  If not blister occurs, it is ok to use a pumice stone or nail file to gently file down the thickened warty tissue.   (do not " use the same nail file that you use on your finger nails or it could spread the virus).   You may need to return for additional treatment if the wart does not completely resolve.  Plan to return after roughly 3 weeks once the area has healed, if still showing signs of persistent warty tissue.      COURTNEY Curiel NP  [facility]

## 2017-12-07 NOTE — MR AVS SNAPSHOT
After Visit Summary   12/7/2017    Luna Rivera    MRN: [id number]           Patient Information     Date Of Birth          1970        Visit Information        Provider Department      12/7/2017 11:20 AM Catrina Jin NP [facility]        Today's Diagnoses     Plantar warts    -  1      Care Instructions    Following treatment with liquid nitrogen - your skin may get a blister - try to keep this intact and keep the area clean and dry.  If not blister occurs, it is ok to use a pumice stone or nail file to gently file down the thickened warty tissue.   (do not use the same nail file that you use on your finger nails or it could spread the virus).   You may need to return for additional treatment if the wart does not completely resolve.  Plan to return after roughly 3 weeks once the area has healed, if still showing signs of persistent warty tissue.      COURTNEY Curiel                Follow-ups after your visit        Who to contact     If you have questions or need follow up information about today's clinic visit or your schedule please contact [facility] directly at [phone].  Normal or non-critical lab and imaging results will be communicated to you by Samanagehart, letter or phone within 4 business days after the clinic has received the results. If you do not hear from us within 7 days, please contact the clinic through Samanagehart or phone. If you have a critical or abnormal lab result, we will notify you by phone as soon as possible.  Submit refill requests through Buzzero or call your pharmacy and they will forward the refill request to us. Please allow 3 business days for your refill to be completed.          Additional Information About Your Visit        MyChart Information     Buzzero gives you secure access to your electronic health record. If you see a primary care provider, you can also send messages to your care team and make appointments. If  "you have questions, please call your primary care clinic.  If you do not have a primary care provider, please call [phone] and they will assist you.        Care EveryWhere ID     This is your Care EveryWhere ID. This could be used by other organizations to access your [place] medical records  [id number]        Your Vitals Were     Pulse Temperature Height Breastfeeding? BMI (Body Mass Index)       90 98.3  F (36.8  C) (Tympanic) 5' 7.5\" (1.715 m) No 22.22 kg/m2        Blood Pressure from Last 3 Encounters:   12/07/17 112/74   08/17/17 141/87   12/26/16 123/75    Weight from Last 3 Encounters:   12/07/17 144 lb (65.3 kg)   08/17/17 140 lb 11.2 oz (63.8 kg)   12/26/16 140 lb (63.5 kg)              We Performed the Following     DESTRUCT BENIGN LESION, UP TO 14          Today's Medication Changes          These changes are accurate as of: 12/7/17 11:45 AM.  If you have any questions, ask your nurse or doctor.               These medicines have changed or have updated prescriptions.        Dose/Directions    hydrocortisone 2.5 % cream   Commonly known as:  ANUSOL-HC   This may have changed:  additional instructions   Used for:  External hemorrhoids        Place rectally 2 times daily   Quantity:  28.35 g   Refills:  1       polyethylene glycol powder   Commonly known as:  MIRALAX   This may have changed:    - when to take this  - reasons to take this   Used for:  Chronic constipation        Dose:  1 capful   Take 17 g by mouth daily   Quantity:  510 g   Refills:  1                Primary Care Provider Office Phone # Fax #    Catrina Jin [phone] [facility] [address]        Equal Access to Services     BERNIE PRIETO AH: Hadii lakshmi Pierce, wakittyda luqadaha, qaybta kaalmada qasim, thierry you. So [facility] [phone].    ATENCIÓN: Si habla español, tiene a lema disposición servicios gratuitos de asistencia lingüística. Llame al " [phone].    We comply with applicable federal civil rights laws and [place] laws. We do not discriminate on the basis of race, color, national origin, age, disability, sex, sexual orientation, or gender identity.            Thank you!     Thank you for choosing [facility]  for your care. Our goal is always to provide you with excellent care. Hearing back from our patients is one way we can continue to improve our services. Please take a few minutes to complete the written survey that you may receive in the mail after your visit with us. Thank you!             Your Updated Medication List - Protect others around you: Learn how to safely use, store and throw away your medicines at www.disposemymeds.org.          This list is accurate as of: 12/7/17 11:45 AM.  Always use your most recent med list.                   Brand Name Dispense Instructions for use Diagnosis    acyclovir 400 MG tablet    ZOVIRAX    180 tablet    TAKE ONE TABLET BY MOUTH TWICE DAILY    Herpes simplex virus (HSV) infection       fluticasone 50 MCG/ACT spray    FLONASE    3 Bottle    Spray 2 sprays into both nostrils daily    Allergic rhinitis       hydrocortisone 2.5 % cream    ANUSOL-HC    28.35 g    Place rectally 2 times daily    External hemorrhoids       LORazepam 0.5 MG tablet    ATIVAN    15 tablet    Take 1 tablet (0.5 mg) by mouth daily as needed for anxiety    Situational anxiety, Grief reaction       polyethylene glycol powder    MIRALAX    510 g    Take 17 g by mouth daily    Chronic constipation       PRILOSEC PO      Take 20 mg by mouth daily as needed        tretinoin 0.025 % topical gel    RETIN-A    45 g    Apply  topically At Bedtime.    Acne, unspecified acne type

## 2017-12-07 NOTE — NURSING NOTE
"Initial /74  Pulse 90  Temp 98.3  F (36.8  C) (Tympanic)  Ht 5' 7.5\" (1.715 m)  Wt 144 lb (65.3 kg)  Breastfeeding? No  BMI 22.22 kg/m2 Estimated body mass index is 22.22 kg/(m^2) as calculated from the following:    Height as of this encounter: 5' 7.5\" (1.715 m).    Weight as of this encounter: 144 lb (65.3 kg). .    Luly Louise, ABI ([facility])  "

## 2017-12-07 NOTE — PATIENT INSTRUCTIONS
Following treatment with liquid nitrogen - your skin may get a blister - try to keep this intact and keep the area clean and dry.  If not blister occurs, it is ok to use a pumice stone or nail file to gently file down the thickened warty tissue.   (do not use the same nail file that you use on your finger nails or it could spread the virus).   You may need to return for additional treatment if the wart does not completely resolve.  Plan to return after roughly 3 weeks once the area has healed, if still showing signs of persistent warty tissue.      Catrina Jin, COURTNEY

## 2017-12-19 ENCOUNTER — OFFICE VISIT (OUTPATIENT)
Dept: FAMILY MEDICINE | Facility: CLINIC | Age: 47
End: 2017-12-19
Payer: COMMERCIAL

## 2017-12-19 VITALS
BODY MASS INDEX: 22.38 KG/M2 | WEIGHT: 145 LBS | HEART RATE: 87 BPM | SYSTOLIC BLOOD PRESSURE: 111 MMHG | DIASTOLIC BLOOD PRESSURE: 74 MMHG

## 2017-12-19 DIAGNOSIS — B07.0 PLANTAR WARTS: Primary | ICD-10-CM

## 2017-12-19 DIAGNOSIS — B07.8 COMMON WART: ICD-10-CM

## 2017-12-19 PROCEDURE — 17110 DESTRUCTION B9 LES UP TO 14: CPT | Performed by: NURSE PRACTITIONER

## 2017-12-19 NOTE — NURSING NOTE
"Chief Complaint   Patient presents with     Wart     follow up left foot. Treated once in the past 2 weeks ago.        Initial /74 (BP Location: Right arm, Patient Position: Chair, Cuff Size: Adult Regular)  Pulse 87  Wt 145 lb (65.8 kg)  BMI 22.38 kg/m2 Estimated body mass index is 22.38 kg/(m^2) as calculated from the following:    Height as of 12/7/17: 5' 7.5\" (1.715 m).    Weight as of this encounter: 145 lb (65.8 kg).  Medication Reconciliation: complete   Moni Spears CMA    "

## 2017-12-19 NOTE — MR AVS SNAPSHOT
After Visit Summary   12/19/2017    Luna Rivera    MRN: [id number]           Patient Information     Date Of Birth          1970        Visit Information        Provider Department      12/19/2017 3:40 PM Catrina Jin NP [facility]        Care Instructions    Following treatment with liquid nitrogen - your skin may get a blister - try to keep this intact and keep the area clean and dry.  If not blister occurs, it is ok to use a pumice stone or nail file to gently file down the thickened warty tissue.   (do not use the same nail file that you use on your finger nails or it could spread the virus).   You may need to return for additional treatment if the wart does not completely resolve.  Plan to return after roughly 3 weeks once the area has healed, if still showing signs of persistent warty tissue.      May start using OTC treatments (Mediplast or Dr. Joseph) if treatment ineffective.      COURTNEY Curiel            Follow-ups after your visit        Your next 10 appointments already scheduled     Dec 20, 2017  2:00 PM CST   Ortho Treatment with Cristiana Smith PT   [facility] ([facility])    [address] [address] [phone]              Who to contact     If you have questions or need follow up information about today's clinic visit or your schedule please contact [facility] directly at [phone].  Normal or non-critical lab and imaging results will be communicated to you by MyChart, letter or phone within 4 business days after the clinic has received the results. If you do not hear from us within 7 days, please contact the clinic through MyChart or phone. If you have a critical or abnormal lab result, we will notify you by phone as soon as possible.  Submit refill requests through Metacafe or call your pharmacy and they will forward the refill request to us. Please allow 3  business days for your refill to be completed.          Additional Information About Your Visit        MyChart Information     LiveRamphart gives you secure access to your electronic health record. If you see a primary care provider, you can also send messages to your care team and make appointments. If you have questions, please call your primary care clinic.  If you do not have a primary care provider, please call [phone] and they will assist you.        Care EveryWhere ID     This is your Care EveryWhere ID. This could be used by other organizations to access your [place] medical records  [id number]        Your Vitals Were     Pulse BMI (Body Mass Index)                87 22.38 kg/m2           Blood Pressure from Last 3 Encounters:   12/19/17 111/74   12/07/17 112/74   08/17/17 141/87    Weight from Last 3 Encounters:   12/19/17 145 lb (65.8 kg)   12/07/17 144 lb (65.3 kg)   08/17/17 140 lb 11.2 oz (63.8 kg)              Today, you had the following     No orders found for display         Today's Medication Changes          These changes are accurate as of: 12/19/17  4:07 PM.  If you have any questions, ask your nurse or doctor.               These medicines have changed or have updated prescriptions.        Dose/Directions    hydrocortisone 2.5 % cream   Commonly known as:  ANUSOL-HC   This may have changed:  additional instructions   Used for:  External hemorrhoids        Place rectally 2 times daily   Quantity:  28.35 g   Refills:  1       polyethylene glycol powder   Commonly known as:  MIRALAX   This may have changed:    - when to take this  - reasons to take this   Used for:  Chronic constipation        Dose:  1 capful   Take 17 g by mouth daily   Quantity:  510 g   Refills:  1                Primary Care Provider Office Phone # Fax #    Catrina Jin [phone] [address] [facility] [address]        Equal Access to Services     ANA PRIETO AH: Devin hernandes  Kari, marda luqadaha, qajaredta kaja tripp, thierry avery krunaldino laMadisonclotilde avelino. So [facility] [phone].    ATENCIÓN: Si sahara pimentel, tiene a lema disposición servicios gratuitos de asistencia lingüística. Fahad al [phone].    We comply with applicable federal civil rights laws and [place] laws. We do not discriminate on the basis of race, color, national origin, age, disability, sex, sexual orientation, or gender identity.            Thank you!     Thank you for choosing [facility]  for your care. Our goal is always to provide you with excellent care. Hearing back from our patients is one way we can continue to improve our services. Please take a few minutes to complete the written survey that you may receive in the mail after your visit with us. Thank you!             Your Updated Medication List - Protect others around you: Learn how to safely use, store and throw away your medicines at www.disposemymeds.org.          This list is accurate as of: 12/19/17  4:07 PM.  Always use your most recent med list.                   Brand Name Dispense Instructions for use Diagnosis    acyclovir 400 MG tablet    ZOVIRAX    180 tablet    TAKE ONE TABLET BY MOUTH TWICE DAILY    Herpes simplex virus (HSV) infection       fluticasone 50 MCG/ACT spray    FLONASE    3 Bottle    Spray 2 sprays into both nostrils daily    Allergic rhinitis       hydrocortisone 2.5 % cream    ANUSOL-HC    28.35 g    Place rectally 2 times daily    External hemorrhoids       LORazepam 0.5 MG tablet    ATIVAN    15 tablet    Take 1 tablet (0.5 mg) by mouth daily as needed for anxiety    Situational anxiety, Grief reaction       polyethylene glycol powder    MIRALAX    510 g    Take 17 g by mouth daily    Chronic constipation       PRILOSEC PO      Take 20 mg by mouth daily as needed        tretinoin 0.025 % topical gel    RETIN-A    45 g    Apply  topically At Bedtime.    Acne, unspecified acne type

## 2017-12-19 NOTE — PROGRESS NOTES
SUBJECTIVE:   Luna Rivera is a 47 year old female who presents to clinic today for the following health issues:    Chief Complaint   Patient presents with     Wart     follow up left foot. Treated once in the past 2 weeks ago.     Also would like 2 additional warts that were treated last time.    Reports all have improved since last treatment.    Problem list and histories reviewed & adjusted, as indicated.  Additional history: as documented    Patient Active Problem List   Diagnosis     Allergic rhinitis     Herpes simplex virus (HSV) infection     Hemorrhoids     Family history of breast cancer in mother     Menorrhagia     CARDIOVASCULAR SCREENING; LDL GOAL LESS THAN 160     Acne     External hemorrhoids     Hip impingement syndrome, unspecified laterality     Gastroesophageal reflux disease without esophagitis     Ptosis of eyelid, bilateral     Situational anxiety     Grief reaction     Past Surgical History:   Procedure Laterality Date     ARTHROSCOPY KNEE WITH MEDIAL MENISCECTOMY  2014    Procedure: ARTHROSCOPY KNEE WITH MEDIAL MENISCECTOMY;  Surgeon: Alejandro Dodge MD;  Location: [facility]       DELIVERY ONLY  ,      C LIGATE FALLOPIAN TUBE,POSTPARTUM       ESOPHAGOSCOPY, GASTROSCOPY, DUODENOSCOPY (EGD), COMBINED N/A 2016    Procedure: COMBINED ESOPHAGOSCOPY, GASTROSCOPY, DUODENOSCOPY (EGD), BIOPSY SINGLE OR MULTIPLE;  Surgeon: Jose Mora MD;  Location: [facility]     HC HYSTEROSCOPY, SURGICAL; W/ ENDOMETRIAL ABLATION, ANY METHOD  3/30/10    Novasure ablation       Social History   Substance Use Topics     Smoking status: Never Smoker     Smokeless tobacco: Never Used     Alcohol use Yes      Comment: rarely     Family History   Problem Relation Age of Onset     Breast Cancer Mother      age 46     CANCER Mother      Throat, larynx (d/t radiation from breast cancer)tongue cancer 2011     Hypertension Father      CANCER Father      lung CA     Neurologic Disorder  Maternal Grandmother      Parkinson's     C.A.D. Maternal Grandmother      Thyroid Disease Maternal Grandmother      hyperthyroid     Arthritis Maternal Grandfather      HEART DISEASE Maternal Grandfather      OSTEOPOROSIS Paternal Grandmother      HEART DISEASE Paternal Grandfather      Cancer - colorectal No family hx of      Prostate Cancer No family hx of      CEREBROVASCULAR DISEASE No family hx of      DIABETES No family hx of      Asthma No family hx of          Current Outpatient Prescriptions   Medication Sig Dispense Refill     acyclovir (ZOVIRAX) 400 MG tablet TAKE ONE TABLET BY MOUTH TWICE DAILY 180 tablet 0     tretinoin (RETIN-A) 0.025 % topical gel Apply  topically At Bedtime. 45 g 12     LORazepam (ATIVAN) 0.5 MG tablet Take 1 tablet (0.5 mg) by mouth daily as needed for anxiety 15 tablet 0     Omeprazole (PRILOSEC PO) Take 20 mg by mouth daily as needed        fluticasone (FLONASE) 50 MCG/ACT spray Spray 2 sprays into both nostrils daily 3 Bottle 1     hydrocortisone (ANUSOL-HC) 2.5 % rectal cream Place rectally 2 times daily (Patient taking differently: Place rectally 2 times daily As needed) 28.35 g 1     polyethylene glycol (MIRALAX) powder Take 17 g by mouth daily (Patient taking differently: Take 1 capful by mouth daily as needed ) 510 g 1     Allergies   Allergen Reactions     Compazine Fatigue     Neurological s/s-can't wake up     Ancef [Cefazolin Sodium] Hives     Recent Labs   Lab Test  10/24/16   1214  05/06/15   0858  05/22/14   1056  02/27/13   0945  01/04/11   1003   LDL   --    --   92  134*  114   HDL   --    --   69  63  59   TRIG   --    --   72  63  91   ALT   --   18   --    --    --    CR  0.84  0.84   --    --    --    GFRESTIMATED  73  73   --    --    --    GFRESTBLACK  88  88   --    --    --    POTASSIUM  4.1  3.9   --    --    --    TSH  1.71   --   2.09   --    --       BP Readings from Last 3 Encounters:   12/19/17 111/74   12/07/17 112/74   08/17/17 141/87    Wt  Readings from Last 3 Encounters:   12/19/17 145 lb (65.8 kg)   12/07/17 144 lb (65.3 kg)   08/17/17 140 lb 11.2 oz (63.8 kg)                  Labs reviewed in EPIC          Reviewed and updated as needed this visit by clinical staffTobacco  Allergies  Med Hx  Surg Hx  Fam Hx  Soc Hx      Reviewed and updated as needed this visit by Provider         ROS:  Constitutional, HEENT, cardiovascular, pulmonary, GI, , musculoskeletal, neuro, skin, endocrine and psych systems are negative, except as otherwise noted.      OBJECTIVE:   /74 (BP Location: Right arm, Patient Position: Chair, Cuff Size: Adult Regular)  Pulse 87  Wt 145 lb (65.8 kg)  BMI 22.38 kg/m2  Body mass index is 22.38 kg/(m^2).  GENERAL: healthy, alert and no distress  SKIN: Left foot with small flat palmar wart, no callus formation, right hand with small 1 mm wart, left hand knuckle with flat 1 mm wart.  After a complete discussion of the multiple treatment options for warts including the risks and bennefits of each, the patient has decided on treatment with Liquid nitrogen.     Each wart was frozen easily three times with liquid nitrogen. .  A total of 3 warts are treated today.        Diagnostic Test Results:  none     ASSESSMENT/PLAN:     1. Plantar warts     - DESTRUCT BENIGN LESION, UP TO 14    2. Common wart     - DESTRUCT BENIGN LESION, UP TO 14    Patient Instructions   Following treatment with liquid nitrogen - your skin may get a blister - try to keep this intact and keep the area clean and dry.  If not blister occurs, it is ok to use a pumice stone or nail file to gently file down the thickened warty tissue.   (do not use the same nail file that you use on your finger nails or it could spread the virus).   You may need to return for additional treatment if the wart does not completely resolve.  Plan to return after roughly 3 weeks once the area has healed, if still showing signs of persistent warty tissue.      May start using OTC  treatments (Deirdre or Dr. Joseph) if treatment ineffective.      COURTNEY Curiel NP  [facility]

## 2017-12-19 NOTE — PATIENT INSTRUCTIONS
Following treatment with liquid nitrogen - your skin may get a blister - try to keep this intact and keep the area clean and dry.  If not blister occurs, it is ok to use a pumice stone or nail file to gently file down the thickened warty tissue.   (do not use the same nail file that you use on your finger nails or it could spread the virus).   You may need to return for additional treatment if the wart does not completely resolve.  Plan to return after roughly 3 weeks once the area has healed, if still showing signs of persistent warty tissue.      May start using OTC treatments (Mediplast or Dr. Joseph) if treatment ineffective.      COURTNEY Curiel

## 2017-12-20 ENCOUNTER — HOSPITAL ENCOUNTER (OUTPATIENT)
Dept: PHYSICAL THERAPY | Facility: CLINIC | Age: 47
Setting detail: THERAPIES SERIES
End: 2017-12-20
Attending: NURSE PRACTITIONER
Payer: COMMERCIAL

## 2017-12-20 PROCEDURE — 40000718 ZZHC STATISTIC PT DEPARTMENT ORTHO VISIT: Performed by: PHYSICAL THERAPIST

## 2017-12-20 PROCEDURE — 97140 MANUAL THERAPY 1/> REGIONS: CPT | Mod: GP | Performed by: PHYSICAL THERAPIST

## 2017-12-20 PROCEDURE — 97035 APP MDLTY 1+ULTRASOUND EA 15: CPT | Mod: GP | Performed by: PHYSICAL THERAPIST

## 2017-12-20 NOTE — PROGRESS NOTES
"Outpatient Physical Therapy Progress Note     Patient: Lnua Rivera  : 1970    Beginning/End Dates of Reporting Period:  17 to 2017.    Referring Provider: Catrina Jin Diagnosis:  Acute P in R shoulder      Client Self Report:  About 8 days ago, was getting into the back seat of  her husbands car and clipped head on door, so head was forced to the left and since then the Right shoulder has been worse again and the left neck is also bothering. Neck went into spasms and saw stars.     Objective Measurements:  Objective Measure: SPADI   Details: 17  Score 20.77    10/18/17  23/85.     INITIALLY: 33.85%     Objective Measure: AROM  Details:  17 R Flex 132, Abd 142 w/ Pinch at end range. IR T12 w/P and stiffness of R shoulder. ER Normal. INITIALLY: R Flex 155 w/ P, Abd 150 w/ P, IR T12 w/P, ER NOrmal     Objective Measure: MMT  Details: 17 Abd at 90 5-, IR 5 and No P.      INITIALLY:Abd at 90 4 w/P; IR 5-     Objective Measure: Special Tests  Details: 17 Bicipital Tendonitis +, NEER's w/ Flex and Abd, Crossover.    INITIALLY:  Bicip Tendonitis, NEER's w/ Flex and Abd, K-H, O-Alexander's Weak w/ Supination, Sulcus, Crossover.     Objective Measure: CROM:   Details: 17 Flex 1 1/2\" and Stiff, Ext 25% w/ P mid to L C Spine. SB R 35, L 33 w/ P L CSpine;  Rot R 55, L 55 w/ P just to the L of mid spine.     Objective Measure: Spec Tests/Palpation   Details: Downglide R C3 to C6, Upglide R C4 to C6. Tight R 1st Rib.  B Lev, L>R UTrap. Tight L Scalene.     Goals:  Goal Identifier 1   Goal Description STG: Place an object on a high shelf 2/10  12/20/17  Rated 5/10, NOT MET    Target Date 10/18/17   Date Met      Progress:     Goal Identifier 2   Goal Description STG: Carrying heavy object 2/10 12/20/17 3/10 on Functional Scale. NOT MET    Target Date 10/18/17   Date Met      Progress:     Goal Identifier 3   Goal Description STG: Washing hair 1/10.  17 MET - " Rated 0/10     Target Date 10/18/17   Date Met  10/18/17   Progress:     Goal Identifier 4   Goal Description LTG: Pt will be independnet w/ HEP to manage R shoulder weakness, P for better function and use of R U/E.   Target Date 11/10/17   Date Met      Progress:     Progress Toward Goals:   Progress this reporting period: Pt has met 1/3 STG's;.   Progress limited due to Recent exacerbation from hitting head on car door, and sx's not resolving on own.     Plan:  Continue therapy per current plan of care.  Changes to therapy plan of care: Neck Treatment included d/t recent neck injury.     Discharge:  No  Cristiana Smith, PT, [facility] (#[id number])  [facility]           [phone]  Fax          [phone]  Appt #      [phone]

## 2017-12-28 ENCOUNTER — HOSPITAL ENCOUNTER (OUTPATIENT)
Dept: PHYSICAL THERAPY | Facility: CLINIC | Age: 47
Setting detail: THERAPIES SERIES
End: 2017-12-28
Attending: NURSE PRACTITIONER
Payer: COMMERCIAL

## 2017-12-28 PROCEDURE — 40000718 ZZHC STATISTIC PT DEPARTMENT ORTHO VISIT: Performed by: PHYSICAL THERAPIST

## 2017-12-28 PROCEDURE — 97012 MECHANICAL TRACTION THERAPY: CPT | Mod: GP | Performed by: PHYSICAL THERAPIST

## 2017-12-28 PROCEDURE — 97035 APP MDLTY 1+ULTRASOUND EA 15: CPT | Mod: GP | Performed by: PHYSICAL THERAPIST

## 2017-12-28 NOTE — ADDENDUM NOTE
Encounter addended by: Cristiana Smith, PT on: 12/27/2017  6:42 PM<BR>     Actions taken: Flowsheet accepted

## 2018-01-19 DIAGNOSIS — B07.0 PLANTAR WARTS: Primary | ICD-10-CM

## 2018-03-06 ENCOUNTER — APPOINTMENT (OUTPATIENT)
Dept: CT IMAGING | Facility: CLINIC | Age: 48
End: 2018-03-06
Attending: EMERGENCY MEDICINE
Payer: COMMERCIAL

## 2018-03-06 ENCOUNTER — APPOINTMENT (OUTPATIENT)
Dept: ULTRASOUND IMAGING | Facility: CLINIC | Age: 48
End: 2018-03-06
Attending: EMERGENCY MEDICINE
Payer: COMMERCIAL

## 2018-03-06 ENCOUNTER — HOSPITAL ENCOUNTER (EMERGENCY)
Facility: CLINIC | Age: 48
Discharge: HOME | End: 2018-03-06
Attending: EMERGENCY MEDICINE | Admitting: EMERGENCY MEDICINE
Payer: COMMERCIAL

## 2018-03-06 ENCOUNTER — NURSE TRIAGE (OUTPATIENT)
Dept: NURSING | Facility: CLINIC | Age: 48
End: 2018-03-06

## 2018-03-06 VITALS
WEIGHT: 145 LBS | DIASTOLIC BLOOD PRESSURE: 78 MMHG | BODY MASS INDEX: 21.98 KG/M2 | SYSTOLIC BLOOD PRESSURE: 132 MMHG | TEMPERATURE: 98.2 F | HEART RATE: 115 BPM | RESPIRATION RATE: 18 BRPM | OXYGEN SATURATION: 98 % | HEIGHT: 68 IN

## 2018-03-06 DIAGNOSIS — R10.30 LOWER ABDOMINAL PAIN: ICD-10-CM

## 2018-03-06 DIAGNOSIS — D25.9 UTERINE LEIOMYOMA, UNSPECIFIED LOCATION: ICD-10-CM

## 2018-03-06 LAB
ALBUMIN UR-MCNC: NEGATIVE MG/DL
ANION GAP SERPL CALCULATED.3IONS-SCNC: 9 MMOL/L (ref 3–14)
APPEARANCE UR: CLEAR
BACTERIA #/AREA URNS HPF: ABNORMAL /HPF
BASOPHILS # BLD AUTO: 0 10E9/L (ref 0–0.2)
BASOPHILS NFR BLD AUTO: 0.3 %
BILIRUB UR QL STRIP: NEGATIVE
BUN SERPL-MCNC: 15 MG/DL (ref 7–30)
CALCIUM SERPL-MCNC: 9 MG/DL (ref 8.5–10.1)
CHLORIDE SERPL-SCNC: 104 MMOL/L (ref 94–109)
CO2 SERPL-SCNC: 25 MMOL/L (ref 20–32)
COLOR UR AUTO: YELLOW
CREAT SERPL-MCNC: 0.84 MG/DL (ref 0.52–1.04)
DIFFERENTIAL METHOD BLD: ABNORMAL
EOSINOPHIL # BLD AUTO: 0 10E9/L (ref 0–0.7)
EOSINOPHIL NFR BLD AUTO: 0.4 %
ERYTHROCYTE [DISTWIDTH] IN BLOOD BY AUTOMATED COUNT: 12.8 % (ref 10–15)
GFR SERPL CREATININE-BSD FRML MDRD: 72 ML/MIN/1.7M2
GLUCOSE SERPL-MCNC: 85 MG/DL (ref 70–99)
GLUCOSE UR STRIP-MCNC: NEGATIVE MG/DL
HCG UR QL: NEGATIVE
HCT VFR BLD AUTO: 46.4 % (ref 35–47)
HGB BLD-MCNC: 15.8 G/DL (ref 11.7–15.7)
HGB UR QL STRIP: ABNORMAL
IMM GRANULOCYTES # BLD: 0 10E9/L (ref 0–0.4)
IMM GRANULOCYTES NFR BLD: 0.3 %
KETONES UR STRIP-MCNC: 80 MG/DL
LEUKOCYTE ESTERASE UR QL STRIP: NEGATIVE
LYMPHOCYTES # BLD AUTO: 1.9 10E9/L (ref 0.8–5.3)
LYMPHOCYTES NFR BLD AUTO: 28.6 %
MCH RBC QN AUTO: 30 PG (ref 26.5–33)
MCHC RBC AUTO-ENTMCNC: 34.1 G/DL (ref 31.5–36.5)
MCV RBC AUTO: 88 FL (ref 78–100)
MONOCYTES # BLD AUTO: 0.5 10E9/L (ref 0–1.3)
MONOCYTES NFR BLD AUTO: 7.6 %
MUCOUS THREADS #/AREA URNS LPF: PRESENT /LPF
NEUTROPHILS # BLD AUTO: 4.2 10E9/L (ref 1.6–8.3)
NEUTROPHILS NFR BLD AUTO: 62.8 %
NITRATE UR QL: NEGATIVE
PH UR STRIP: 5 PH (ref 5–7)
PLATELET # BLD AUTO: 300 10E9/L (ref 150–450)
POTASSIUM SERPL-SCNC: 3.4 MMOL/L (ref 3.4–5.3)
RBC # BLD AUTO: 5.27 10E12/L (ref 3.8–5.2)
RBC #/AREA URNS AUTO: 1 /HPF (ref 0–2)
SODIUM SERPL-SCNC: 138 MMOL/L (ref 133–144)
SOURCE: ABNORMAL
SP GR UR STRIP: 1.01 (ref 1–1.03)
SQUAMOUS #/AREA URNS AUTO: 1 /HPF (ref 0–1)
UROBILINOGEN UR STRIP-MCNC: 0 MG/DL (ref 0–2)
WBC # BLD AUTO: 6.7 10E9/L (ref 4–11)
WBC #/AREA URNS AUTO: <1 /HPF (ref 0–5)

## 2018-03-06 PROCEDURE — 25000125 ZZHC RX 250: Performed by: EMERGENCY MEDICINE

## 2018-03-06 PROCEDURE — 99285 EMERGENCY DEPT VISIT HI MDM: CPT | Mod: Z6 | Performed by: EMERGENCY MEDICINE

## 2018-03-06 PROCEDURE — 96360 HYDRATION IV INFUSION INIT: CPT | Mod: 59 | Performed by: EMERGENCY MEDICINE

## 2018-03-06 PROCEDURE — 85025 COMPLETE CBC W/AUTO DIFF WBC: CPT | Performed by: EMERGENCY MEDICINE

## 2018-03-06 PROCEDURE — 76830 TRANSVAGINAL US NON-OB: CPT

## 2018-03-06 PROCEDURE — 81025 URINE PREGNANCY TEST: CPT | Performed by: EMERGENCY MEDICINE

## 2018-03-06 PROCEDURE — 80048 BASIC METABOLIC PNL TOTAL CA: CPT | Performed by: EMERGENCY MEDICINE

## 2018-03-06 PROCEDURE — 25000128 H RX IP 250 OP 636: Performed by: EMERGENCY MEDICINE

## 2018-03-06 PROCEDURE — 81001 URINALYSIS AUTO W/SCOPE: CPT | Performed by: EMERGENCY MEDICINE

## 2018-03-06 PROCEDURE — 74177 CT ABD & PELVIS W/CONTRAST: CPT

## 2018-03-06 PROCEDURE — 99285 EMERGENCY DEPT VISIT HI MDM: CPT | Mod: 25 | Performed by: EMERGENCY MEDICINE

## 2018-03-06 RX ORDER — OXYCODONE AND ACETAMINOPHEN 5; 325 MG/1; MG/1
1-2 TABLET ORAL EVERY 4 HOURS PRN
Qty: 8 TABLET | Refills: 0 | Status: SHIPPED | OUTPATIENT
Start: 2018-03-06 | End: 2018-03-22

## 2018-03-06 RX ORDER — IOPAMIDOL 755 MG/ML
80 INJECTION, SOLUTION INTRAVASCULAR ONCE
Status: COMPLETED | OUTPATIENT
Start: 2018-03-06 | End: 2018-03-06

## 2018-03-06 RX ADMIN — IOPAMIDOL 80 ML: 755 INJECTION, SOLUTION INTRAVENOUS at 19:56

## 2018-03-06 RX ADMIN — SODIUM CHLORIDE 500 ML: 9 INJECTION, SOLUTION INTRAVENOUS at 21:34

## 2018-03-06 RX ADMIN — SODIUM CHLORIDE 60 ML: 9 INJECTION, SOLUTION INTRAVENOUS at 19:56

## 2018-03-06 ASSESSMENT — ENCOUNTER SYMPTOMS
CHILLS: 0
DYSURIA: 0
NAUSEA: 0
VOMITING: 0
DIARRHEA: 0
ABDOMINAL PAIN: 1

## 2018-03-06 NOTE — ED AVS SNAPSHOT
[facility] Emergency Department    [address] [facility] [address] [address]    Phone:  [phone]    Fax:  [phone]                                       Luna Rivera   MRN: [id number]    Department:  [facility] Emergency Department   Date of Visit:  3/6/2018           Patient Information     Date Of Birth          1970        Your diagnoses for this visit were:     Lower abdominal pain     Uterine leiomyoma, unspecified location        You were seen by Daren España MD.      Follow-up Information     Follow up with Catrina Jin NP.    Specialty:  Nurse Practitioner - Family    Why:  As needed    Contact information:    [address] [place] [id number]  [phone]          Follow up with [facility] Emergency Department.    Specialty:  EMERGENCY MEDICINE    Why:  If symptoms worsen    Contact information:    [address] [place] [id number] [phone]    Additional information:    The medical center is located at   [address] [place] [address] (between [address]   [address] in [place], [place] miles north   of [place]).        Discharge Instructions       Return if symptoms worsen or new symptoms develop.  Follow-up with primary care physician next available.  Drink plenty fluids.  Take pain medication as directed.  Follow-up with your OB/GYN doctor when he returns from [place].  What Are Fibroids?  Fibroids (also called myomas and leiomyomas) are growths that usually form in the wall of your uterus. Fibroids are the most common tumor in women. They are almost always noncancer (benign) and harmless. Fibroids are made up of connective tissue and muscle cells. They start as pea-sized lumps. But they can grow steadily during your reproductive years. Many fibroids just need to be watched. Others may need treatment if they become too large or cause symptoms.    Possible problems  Fibroids often cause no symptoms. But a fibroid that grows quickly in your  uterus can cause 1 or more of the following problems:    Excessive uterine bleeding, leading to a lack of red blood cells (anemia)    Frequent urge to urinate    Trouble having bowel movements    Achiness, heaviness, or fullness    Back or belly (abdominal) pain    Pain during sex    Trouble getting pregnant or being unable to get pregnant    Problems with pregnancy    Enlargement of the lower abdomen  Treatment is tailored for you  No 2 fibroids are the same. The type of treatment you will have depends on several things including:    How many fibroids you have, their size, location, and how fast they grow    How severe your symptoms are    If you plan to have children in the future  There are a growing number of effective ways to treat fibroids. After your medical evaluation, your healthcare provider will talk with you about the best options to solve your particular problem and meet your needs.  Your future checkups  Treating your fibroids is likely to relieve your symptoms. But your healthcare provider will want to check your progress. Ask your provider about any other follow-up visits you might need.   Date Last Reviewed: 6/1/2017 2000-2017 [employer]. [address] [place] [address]. All rights reserved. This information is not intended as a substitute for professional medical care. Always follow your healthcare professional's instructions.          Future Appointments        Provider Department Dept Phone Center    3/7/2018 8:00 AM Andreas Alarcon MD [facility] [phone] [facility]      24 Hour Appointment Hotline       To make an appointment at any [facility], call [phone] ([phone]). If you don't have a family doctor or clinic, we will help you find one. [facility] are conveniently located to serve the needs of you and your family.          ED Discharge Orders     OB/GYN REFERRAL       Your provider has referred you to:  [facility]: [facility]  - [place] [phone]   [email or web address]    Please be aware that coverage of these services is subject to the terms and limitations of your health insurance plan.  Call member services at your health plan with any benefit or coverage questions.      Please bring the following with you to your appointment:    (1) Any X-Rays, CTs or MRIs which have been performed.  Contact the facility where they were done to arrange for  prior to your scheduled appointment.   (2) List of current medications   (3) This referral request   (4) Any documents/labs given to you for this referral                     Review of your medicines      START taking        Dose / Directions Last dose taken    oxyCODONE-acetaminophen 5-325 MG per tablet   Commonly known as:  PERCOCET   Dose:  1-2 tablet   Quantity:  8 tablet        Take 1-2 tablets by mouth every 4 hours as needed   Refills:  0          Our records show that you are taking the medicines listed below. If these are incorrect, please call your family doctor or clinic.        Dose / Directions Last dose taken    acyclovir 400 MG tablet   Commonly known as:  ZOVIRAX   Quantity:  180 tablet        TAKE ONE TABLET BY MOUTH TWICE DAILY   Refills:  0        fluticasone 50 MCG/ACT spray   Commonly known as:  FLONASE   Dose:  2 spray   Quantity:  3 Bottle        Spray 2 sprays into both nostrils daily   Refills:  1        hydrocortisone 2.5 % cream   Commonly known as:  ANUSOL-HC   Quantity:  28.35 g        Place rectally 2 times daily   Refills:  1        polyethylene glycol powder   Commonly known as:  MIRALAX   Dose:  1 capful   Quantity:  510 g        Take 17 g by mouth daily   Refills:  1        PRILOSEC PO   Dose:  20 mg        Take 20 mg by mouth daily as needed   Refills:  0        tretinoin 0.025 % topical gel   Commonly known as:  RETIN-A   Quantity:  45 g        Apply  topically At Bedtime.   Refills:  12                Prescriptions were sent or  printed at these locations (1 Prescription)                   [facility] [place], [place] - [address] [facility]   [address] [facility] [address]    Telephone:  [phone]   Fax:  [phone]   Hours:                  Printed at Department/Unit printer (1 of 1)         oxyCODONE-acetaminophen (PERCOCET) 5-325 MG per tablet                Procedures and tests performed during your visit     Basic metabolic panel    CBC with platelets differential    CT Abdomen Pelvis w Contrast    Give 20 ounces of water 15 minutes before CT of abdomen    HCG qualitative urine (UPT)    Pelvis w/Transvaginal    Peripheral IV catheter    UA reflex to Microscopic      Orders Needing Specimen Collection     None      Pending Results     No orders found from 3/4/2018 to 3/7/2018.            Pending Culture Results     No orders found from 3/4/2018 to 3/7/2018.            Pending Results Instructions     If you had any lab results that were not finalized at the time of your Discharge, you can call the ED Lab Result RN at [phone]. You will be contacted by this team for any positive Lab results or changes in treatment. The nurses are available 7 days a week from 10A to 6:30P.  You can leave a message 24 hours per day and they will return your call.        Test Results From Your Hospital Stay        3/6/2018  7:52 PM      Component Results     Component Value Ref Range & Units Status    Color Urine Yellow  Final    Appearance Urine Clear  Final    Glucose Urine Negative NEG^Negative mg/dL Final    Bilirubin Urine Negative NEG^Negative Final    Ketones Urine 80 (A) NEG^Negative mg/dL Final    Specific Gravity Urine 1.015 1.003 - 1.035 Final    Blood Urine Moderate (A) NEG^Negative Final    pH Urine 5.0 5.0 - 7.0 pH Final    Protein Albumin Urine Negative NEG^Negative mg/dL Final    Urobilinogen mg/dL 0.0 0.0 - 2.0 mg/dL Final    Nitrite Urine Negative NEG^Negative Final    Leukocyte Esterase Urine Negative NEG^Negative  Final    Source Midstream Urine  Final    RBC Urine 1 0 - 2 /HPF Final    WBC Urine <1 0 - 5 /HPF Final    Bacteria Urine Few (A) NEG^Negative /HPF Final    Squamous Epithelial /HPF Urine 1 0 - 1 /HPF Final    Mucous Urine Present (A) NEG^Negative /LPF Final         3/6/2018  7:37 PM      Component Results     Component Value Ref Range & Units Status    HCG Qual Urine Negative NEG^Negative Final    This test is for screening purposes.  Results should be interpreted along with   the clinical picture.  Confirmation testing is available if warranted by   ordering [id number], HCG Quantitative Pregnancy.           3/6/2018  7:30 PM      Component Results     Component Value Ref Range & Units Status    WBC 6.7 4.0 - 11.0 10e9/L Final    RBC Count 5.27 (H) 3.8 - 5.2 10e12/L Final    Hemoglobin 15.8 (H) 11.7 - 15.7 g/dL Final    Hematocrit 46.4 35.0 - 47.0 % Final    MCV 88 78 - 100 fl Final    MCH 30.0 26.5 - 33.0 pg Final    MCHC 34.1 31.5 - 36.5 g/dL Final    RDW 12.8 10.0 - 15.0 % Final    Platelet Count 300 150 - 450 10e9/L Final    Diff Method Automated Method  Final    % Neutrophils 62.8 % Final    % Lymphocytes 28.6 % Final    % Monocytes 7.6 % Final    % Eosinophils 0.4 % Final    % Basophils 0.3 % Final    % Immature Granulocytes 0.3 % Final    Absolute Neutrophil 4.2 1.6 - 8.3 10e9/L Final    Absolute Lymphocytes 1.9 0.8 - 5.3 10e9/L Final    Absolute Monocytes 0.5 0.0 - 1.3 10e9/L Final    Absolute Eosinophils 0.0 0.0 - 0.7 10e9/L Final    Absolute Basophils 0.0 0.0 - 0.2 10e9/L Final    Abs Immature Granulocytes 0.0 0 - 0.4 10e9/L Final         3/6/2018  7:45 PM      Component Results     Component Value Ref Range & Units Status    Sodium 138 133 - 144 mmol/L Final    Potassium 3.4 3.4 - 5.3 mmol/L Final    Chloride 104 94 - 109 mmol/L Final    Carbon Dioxide 25 20 - 32 mmol/L Final    Anion Gap 9 3 - 14 mmol/L Final    Glucose 85 70 - 99 mg/dL Final    Urea Nitrogen 15 7 - 30 mg/dL Final    Creatinine 0.84 0.52 -  1.04 mg/dL Final    GFR Estimate 72 >60 mL/min/1.7m2 Final    Non  GFR Calc    GFR Estimate If Black 87 >60 mL/min/1.7m2 Final    African American GFR Calc    Calcium 9.0 8.5 - 10.1 mg/dL Final         3/6/2018  8:53 PM      Narrative     CT ABDOMEN AND PELVIS WITH CONTRAST   3/6/2018 7:58 PM     HISTORY: Right lower quadrant abdominal pain for about two months,  worse tonight.    TECHNIQUE: CT abdomen and pelvis with 80 mL Isovue-370 IV. Radiation  dose for this scan was reduced using automated exposure control,  adjustment of the mA and/or kV according to patient size, or iterative  reconstruction technique.    COMPARISON: None.    FINDINGS: Normal appendix. No acute inflammatory change of the bowel.  No evidence for abscess or free air.    Large solid masses within the pelvis noted. These could represent  exophytic large uterine fibroids. For example, one of these posterior  to the uterus is 9.9 x 11.3 cm series 2 image 62. One of these at the  right adnexal region is 8.3 x 7.4 cm image 52. This causes deviation  of the uterus towards the left and anteriorly.    The liver, gallbladder, adrenals, spleen, pancreas, and kidneys do not  show any significant abnormalities.        Impression     IMPRESSION:  1. Rounded solid large masses in the pelvis suggest exophytic uterine  fibroids. These extend towards the right adnexal region as well. As  such, an ovarian origin for these lesions is also difficult to  completely exclude. Consider further assessment.  2. No acute abnormality is seen. Normal appendix.    JOCELYN ADAIR MD         3/6/2018 10:39 PM      Narrative     PELVIC ULTRASOUND WITH ENDOVAGINAL TRANSDUCER    3/6/2018 10:24 PM     HISTORY: Masses in pelvis, try to differentiate uterine versus ovarian  in nature.    TECHNIQUE: Endovaginal sonography was added to the transabdominal exam  to better evaluate the uterus and ovaries.    COMPARISON: CT abdomen and pelvis 3/6/2018.    FINDINGS:  Endometrium is 6 mm thick. Uterus measures 10 x 4.8 x 7.3  cm. Large uterine fibroids identified again. 10.6 x 9.2 x 10.7 cm  posterior lower uterine fibroid appears exophytic and is subserosal.  8.6 x 6.5 x 7.3 cm uterine fibroid at the posterior distal uterus also  appears subserosal. 4.1 x 7.2 x 3.2 cm anterior uterine body  intramural fibroid. Right and left ovaries not able to be visualized.  No free fluid.        Impression     IMPRESSION: Multiple presumed large uterine fibroids. The large sizes  of these lesions limits assessment. Right and left ovaries not able to  be identified.    JOCELYN ADAIR MD                Thank you for choosing [facility]       Thank you for choosing [facility] for your care. Our goal is always to provide you with excellent care. Hearing back from our patients is one way we can continue to improve our services. Please take a few minutes to complete the written survey that you may receive in the mail after you visit with us. Thank you!        [email or web address] Information     [employer] gives you secure access to your electronic health record. If you see a primary care provider, you can also send messages to your care team and make appointments. If you have questions, please call your primary care clinic.  If you do not have a primary care provider, please call [phone] and they will assist you.        Care EveryWhere ID     This is your Care EveryWhere ID. This could be used by other organizations to access your [facility] medical records  [id number]        Equal Access to Services     ANA PRIETO : Hadii lakshmi Pierce, wakittyda red, qaybta kaalmatammi tripp, thierry avitia . So [facility] [phone].    ATENCIÓN: Si habla español, tiene a lema disposición servicios gratuitos de asistencia lingüística. Llame al [phone].    We comply with applicable federal civil rights laws and [place] laws. We do not discriminate on the basis of race, color, national  origin, age, disability, sex, sexual orientation, or gender identity.            After Visit Summary       This is your record. Keep this with you and show to your community pharmacist(s) and doctor(s) at your next visit.

## 2018-03-06 NOTE — ED AVS SNAPSHOT
[facility] Emergency Department    [address] [address]    Phone:  [phone]    Fax:  [phone]                                       Luna Rivera   MRN: [id number]    Department:  [facility] Emergency Department   Date of Visit:  3/6/2018           After Visit Summary Signature Page     I have received my discharge instructions, and my questions have been answered. I have discussed any challenges I see with this plan with the nurse or doctor.    ..........................................................................................................................................  Patient/Patient Representative Signature      ..........................................................................................................................................  Patient Representative Print Name and Relationship to Patient    ..................................................               ................................................  Date                                            Time    ..........................................................................................................................................  Reviewed by Signature/Title    ...................................................              ..............................................  Date                                                            Time

## 2018-03-06 NOTE — TELEPHONE ENCOUNTER
Calling to find out length of wait at [facility].      Additional Information    General information question, no triage required and triager able to answer question    Protocols used: INFORMATION ONLY CALL-ADULT-

## 2018-03-07 ENCOUNTER — TELEPHONE (OUTPATIENT)
Dept: OBGYN | Facility: CLINIC | Age: 48
End: 2018-03-07

## 2018-03-07 ASSESSMENT — ENCOUNTER SYMPTOMS
WHEEZING: 0
TROUBLE SWALLOWING: 0
NUMBNESS: 0
BACK PAIN: 0
WEAKNESS: 0
HEADACHES: 0
FEVER: 0
CONFUSION: 0
COUGH: 0
NECK PAIN: 0
BRUISES/BLEEDS EASILY: 0

## 2018-03-07 NOTE — ED PROVIDER NOTES
"  History     Chief Complaint   Patient presents with     Abdominal Pain     RLQ - pain comes and goes.  pt can palpate a lump.  states it's worse when she menstruates and ovulates.  pain has been steady since 1300 today     HPI  Luna Rivera is a 47 year old female who has a history of external hemorrhoids, GERD, cardiomyopathy, and IBS who presents to the ED for evaluation abdominal pain. Patient reports that she has noticed a painful lump in her RLQ abdomen intermittently for the last 1-2 months. She is unsure what aggravates this pain and discomfort but she believes this is related to ovulation or menstruation.    Here in the ED today, patient reports that she is currently ovulating. She states that she \"feels like something is in there\". She reports that the mass in her RLQ is tender to palpation. Her pain does not radiate from the RLQ abdomen. She denies fever, chills, nausea, vomiting, diarrhea, dysuria, suprapubic abdominal pain, increased vaginal bleeding or unusual vaginal spotting, or vaginal discharge.  Currently rates her pain a 3 out of 10.    Problem List:    Patient Active Problem List    Diagnosis Date Noted     Situational anxiety 08/17/2017     Priority: Medium     Grief reaction 08/17/2017     Priority: Medium     Ptosis of eyelid, bilateral 11/23/2016     Priority: Medium     Gastroesophageal reflux disease without esophagitis 10/24/2016     Priority: Medium     Hip impingement syndrome, unspecified laterality 11/10/2015     Priority: Medium     External hemorrhoids 05/06/2015     Priority: Medium     Acne 10/05/2011     Priority: Medium     CARDIOVASCULAR SCREENING; LDL GOAL LESS THAN 160 10/31/2010     Priority: Medium     Menorrhagia 10/28/2009     Priority: Medium     S/p Novasure ablation 3/30/10       Family history of breast cancer in mother 04/01/2009     Priority: Medium     Hemorrhoids 09/20/2007     Priority: Medium     Problem list name updated by automated process. Provider to " review       Herpes simplex virus (HSV) infection      Priority: Medium     Last outbreak   Problem list name updated by automated process. Provider to review       Allergic rhinitis 2005     Priority: Medium     Has had really bad allergies and cannot sleep at night because tichty and scratchy throat and ears. suring the morning she has blocked congested nose. There is no wheezing or cough. There is sneezing. Usually her symptoms are controlled by calritin but since last two weeks the above aymptoms have been there and are severe.    The symptoms are seasonal and worse at home as comparee d to when she is in the city.    She has tried the sudfed which makes her lightheaded and dizzy              Past Medical History:    Past Medical History:   Diagnosis Date     Cardiomyopathy in other diseases classified elsewhere      Contact dermatitis and other eczema, due to unspecified cause      Enteritis due to [place] virus 2002     Herpes simplex without mention of complication      Irritable bowel syndrome      Other abnormal Papanicolaou smear of cervix and cervical HPV(795.09)        Past Surgical History:    Past Surgical History:   Procedure Laterality Date     ARTHROSCOPY KNEE WITH MEDIAL MENISCECTOMY  2014    Procedure: ARTHROSCOPY KNEE WITH MEDIAL MENISCECTOMY;  Surgeon: Alejandro Dodge MD;  Location: [facility]  DELIVERY ONLY  2002     C LIGATE FALLOPIAN TUBE,POSTPARTUM       ESOPHAGOSCOPY, GASTROSCOPY, DUODENOSCOPY (EGD), COMBINED N/A 2016    Procedure: COMBINED ESOPHAGOSCOPY, GASTROSCOPY, DUODENOSCOPY (EGD), BIOPSY SINGLE OR MULTIPLE;  Surgeon: Jose Mora MD;  Location: [facility]      HYSTEROSCOPY, SURGICAL; W/ ENDOMETRIAL ABLATION, ANY METHOD  3/30/10    Novasure ablation       Family History:    Family History   Problem Relation Age of Onset     Breast Cancer Mother      age 46     CANCER Mother      Throat, larynx (d/t radiation from breast  "cancer)tongue cancer 9/2011     Hypertension Father      CANCER Father      lung CA     Neurologic Disorder Maternal Grandmother      Parkinson's     C.A.D. Maternal Grandmother      Thyroid Disease Maternal Grandmother      hyperthyroid     Arthritis Maternal Grandfather      HEART DISEASE Maternal Grandfather      OSTEOPOROSIS Paternal Grandmother      HEART DISEASE Paternal Grandfather      Cancer - colorectal No family hx of      Prostate Cancer No family hx of      CEREBROVASCULAR DISEASE No family hx of      DIABETES No family hx of      Asthma No family hx of        Social History:  Marital Status:   [2]  Social History   Substance Use Topics     Smoking status: Never Smoker     Smokeless tobacco: Never Used     Alcohol use Yes      Comment: rarely        Medications:      acyclovir (ZOVIRAX) 400 MG tablet   tretinoin (RETIN-A) 0.025 % topical gel   LORazepam (ATIVAN) 0.5 MG tablet   Omeprazole (PRILOSEC PO)   fluticasone (FLONASE) 50 MCG/ACT spray   hydrocortisone (ANUSOL-HC) 2.5 % rectal cream   polyethylene glycol (MIRALAX) powder         Review of Systems   Constitutional: Negative for chills and fever.   HENT: Negative for congestion and trouble swallowing.    Respiratory: Negative for cough and wheezing.    Cardiovascular: Negative for chest pain and leg swelling.   Gastrointestinal: Positive for abdominal pain. Negative for diarrhea, nausea and vomiting.   Genitourinary: Negative for dysuria, vaginal bleeding and vaginal discharge.   Musculoskeletal: Negative for back pain and neck pain.   Skin: Negative for rash.   Neurological: Negative for weakness, numbness and headaches.   Hematological: Does not bruise/bleed easily.   Psychiatric/Behavioral: Negative for confusion.   All other systems reviewed and are negative.      Physical Exam   BP: 136/79  Pulse: 115  Temp: 98.2  F (36.8  C)  Resp: 18  Height: 172.7 cm (5' 8\")  Weight: 65.8 kg (145 lb)  SpO2: 99 %      Physical Exam   Constitutional: " She appears well-developed and well-nourished. No distress.   Eyes: Conjunctivae are normal.   Psychiatric:   Patient appears anxious   Nursing note and vitals reviewed.    HENT: Oral mucosa moist. No lesions.  Neck: Supple  Pulmonary/Chest: Lungs are clear to auscultation bilaterally.  Cardiovascular: Heart is regular rate and rhythm. No murmur.  Abdomen: Soft, non-distended.mild fullness in lower abdomen/ ?mass. Tenderness to palpation in the right lower quadrant no guarding no rebound. Bowel sounds positive.  Musculoskeletal: Moving all extremities well. No peripheral edema.   Neurological: Alert. No focal neurologic deficit.   Skin: No rash.    ED Course     ED Course     Procedures               Critical Care time:  none               Labs Ordered and Resulted from Time of ED Arrival Up to the Time of Departure from the ED - No data to display  Results for orders placed or performed during the hospital encounter of 03/06/18 (from the past 24 hour(s))   UA reflex to Microscopic   Result Value Ref Range    Color Urine Yellow     Appearance Urine Clear     Glucose Urine Negative NEG^Negative mg/dL    Bilirubin Urine Negative NEG^Negative    Ketones Urine 80 (A) NEG^Negative mg/dL    Specific Gravity Urine 1.015 1.003 - 1.035    Blood Urine Moderate (A) NEG^Negative    pH Urine 5.0 5.0 - 7.0 pH    Protein Albumin Urine Negative NEG^Negative mg/dL    Urobilinogen mg/dL 0.0 0.0 - 2.0 mg/dL    Nitrite Urine Negative NEG^Negative    Leukocyte Esterase Urine Negative NEG^Negative    Source Midstream Urine     RBC Urine 1 0 - 2 /HPF    WBC Urine <1 0 - 5 /HPF    Bacteria Urine Few (A) NEG^Negative /HPF    Squamous Epithelial /HPF Urine 1 0 - 1 /HPF    Mucous Urine Present (A) NEG^Negative /LPF   HCG qualitative urine (UPT)   Result Value Ref Range    HCG Qual Urine Negative NEG^Negative   CBC with platelets differential   Result Value Ref Range    WBC 6.7 4.0 - 11.0 10e9/L    RBC Count 5.27 (H) 3.8 - 5.2 10e12/L     Hemoglobin 15.8 (H) 11.7 - 15.7 g/dL    Hematocrit 46.4 35.0 - 47.0 %    MCV 88 78 - 100 fl    MCH 30.0 26.5 - 33.0 pg    MCHC 34.1 31.5 - 36.5 g/dL    RDW 12.8 10.0 - 15.0 %    Platelet Count 300 150 - 450 10e9/L    Diff Method Automated Method     % Neutrophils 62.8 %    % Lymphocytes 28.6 %    % Monocytes 7.6 %    % Eosinophils 0.4 %    % Basophils 0.3 %    % Immature Granulocytes 0.3 %    Absolute Neutrophil 4.2 1.6 - 8.3 10e9/L    Absolute Lymphocytes 1.9 0.8 - 5.3 10e9/L    Absolute Monocytes 0.5 0.0 - 1.3 10e9/L    Absolute Eosinophils 0.0 0.0 - 0.7 10e9/L    Absolute Basophils 0.0 0.0 - 0.2 10e9/L    Abs Immature Granulocytes 0.0 0 - 0.4 10e9/L   Basic metabolic panel   Result Value Ref Range    Sodium 138 133 - 144 mmol/L    Potassium 3.4 3.4 - 5.3 mmol/L    Chloride 104 94 - 109 mmol/L    Carbon Dioxide 25 20 - 32 mmol/L    Anion Gap 9 3 - 14 mmol/L    Glucose 85 70 - 99 mg/dL    Urea Nitrogen 15 7 - 30 mg/dL    Creatinine 0.84 0.52 - 1.04 mg/dL    GFR Estimate 72 >60 mL/min/1.7m2    GFR Estimate If Black 87 >60 mL/min/1.7m2    Calcium 9.0 8.5 - 10.1 mg/dL   CT Abdomen Pelvis w Contrast    Narrative    CT ABDOMEN AND PELVIS WITH CONTRAST   3/6/2018 7:58 PM     HISTORY: Right lower quadrant abdominal pain for about two months,  worse tonight.    TECHNIQUE: CT abdomen and pelvis with 80 mL Isovue-370 IV. Radiation  dose for this scan was reduced using automated exposure control,  adjustment of the mA and/or kV according to patient size, or iterative  reconstruction technique.    COMPARISON: None.    FINDINGS: Normal appendix. No acute inflammatory change of the bowel.  No evidence for abscess or free air.    Large solid masses within the pelvis noted. These could represent  exophytic large uterine fibroids. For example, one of these posterior  to the uterus is 9.9 x 11.3 cm series 2 image 62. One of these at the  right adnexal region is 8.3 x 7.4 cm image 52. This causes deviation  of the uterus towards the  left and anteriorly.    The liver, gallbladder, adrenals, spleen, pancreas, and kidneys do not  show any significant abnormalities.      Impression    IMPRESSION:  1. Rounded solid large masses in the pelvis suggest exophytic uterine  fibroids. These extend towards the right adnexal region as well. As  such, an ovarian origin for these lesions is also difficult to  completely exclude. Consider further assessment.  2. No acute abnormality is seen. Normal appendix.    JOCELYN ADAIR MD   Pelvis w/Transvaginal    Narrative    PELVIC ULTRASOUND WITH ENDOVAGINAL TRANSDUCER    3/6/2018 10:24 PM     HISTORY: Masses in pelvis, try to differentiate uterine versus ovarian  in nature.    TECHNIQUE: Endovaginal sonography was added to the transabdominal exam  to better evaluate the uterus and ovaries.    COMPARISON: CT abdomen and pelvis 3/6/2018.    FINDINGS: Endometrium is 6 mm thick. Uterus measures 10 x 4.8 x 7.3  cm. Large uterine fibroids identified again. 10.6 x 9.2 x 10.7 cm  posterior lower uterine fibroid appears exophytic and is subserosal.  8.6 x 6.5 x 7.3 cm uterine fibroid at the posterior distal uterus also  appears subserosal. 4.1 x 7.2 x 3.2 cm anterior uterine body  intramural fibroid. Right and left ovaries not able to be visualized.  No free fluid.      Impression    IMPRESSION: Multiple presumed large uterine fibroids. The large sizes  of these lesions limits assessment. Right and left ovaries not able to  be identified.    JOCELYN ADAIR MD       Medications   iopamidol (ISOVUE-370) solution 80 mL (80 mLs Intravenous Given 3/6/18 1956)   sodium chloride 0.9 % bag 500mL for CT scan flush use (60 mLs As instructed Given 3/6/18 1956)   0.9% sodium chloride BOLUS (0 mLs Intravenous Stopped 3/6/18 2247)       7:17 PM Patient assessed.    Assessments & Plan (with Medical Decision Making) records were reviewed.  Patient was offered pain medication but did not want any at this time.  Labs were obtained.  White count was  not elevated and there was no left shift.  Basic metabolic panel was unremarkable.  Urinalysis with moderate blood 80 ketones.  Pregnancy test was negative.  Due to patient's probable fullness in her lower abdomen a CT scan of the abdomen and pelvis was obtained.  This revealed a rounded solid large masses in the pelvis suggestive of exophytic uterine fibroids a ovarian origin of these lesions is difficult to completely exclude.  No other acute abnormality is seen.  Normal appendix is noted.  I discussed this case with Dr. Kerr with [facility] and he recommended a pelvic ultrasound.  Findings were discussed with patient in detail.  Her sound was consistent with the findings of multiple uterine fibroids.  The ovaries were not visualized but there is no free fluid.  Patient was informed of findings I did discuss the case again with Dr. Kerr who felt patient could be followed up as an outpatient.  I discussed that patient is going to [place] and will be gone for a week and he felt it was fine that she go to [place] follow-up when she is back.  I am going to give her a few pain pills and she understands if symptoms worsen or new symptoms develop she immediately needs to seek medical attention.     I have reviewed the nursing notes.    I have reviewed the findings, diagnosis, plan and need for follow up with the patient.       Discharge Medication List as of 3/6/2018 10:47 PM      START taking these medications    Details   oxyCODONE-acetaminophen (PERCOCET) 5-325 MG per tablet Take 1-2 tablets by mouth every 4 hours as needed, Disp-8 tablet, R-0, Local Print             Final diagnoses:   Lower abdominal pain   Uterine leiomyoma, unspecified location     This document serves as a record of the services and decisions personally performed and made by Daren España MD. It was created on HIS/HER behalf by   Sandra Rasheed, a trained medical scribe. The creation of this document is based the  provider's statements to the medical scribe.  Sandra Kathya 7:17 PM 3/6/2018    Provider:   The information in this document, created by the medical scribe for me, accurately reflects the services I personally performed and the decisions made by me. I have reviewed and approved this document for accuracy prior to leaving the patient care area.  Daren España MD 7:17 PM 3/6/2018    3/6/2018   [facility] EMERGENCY DEPARTMENT     Daren España MD  03/07/18 1607

## 2018-03-07 NOTE — TELEPHONE ENCOUNTER
Called patient and informed her of Dr. Iglesias's advice below. Patient okay with waiting, leaving for vacation tomorrow, has pain meds if needed.     Jaz RUIZ   [facility] RN

## 2018-03-07 NOTE — TELEPHONE ENCOUNTER
Reason for call:  Patient reporting a symptom    Symptom or request: Was in ER last night due to abdominal pain on right side.  CT scan and U/S done - fibroids found.  Pt is concerned - did make an appt for first available with [facility] (only wants to see [facility]) for 3-23-18 and is on the wait list.  Wondering if ok to wait this long?    Duration (how long have symptoms been present): off and on maybe 2 months. - but also has irritable bowel so hard for her to differentiate between the two.    Have you been treated for this before? No    Additional comments:     Phone Number patient can be reached at:  Home number on file [phone] (home)    Best Time:  any    Can we leave a detailed message on this number:  YES    Call taken on 3/7/2018 at 8:35 AM by Arabella Lay

## 2018-03-07 NOTE — TELEPHONE ENCOUNTER
Gloria Iglesias MD   [facility] [place] [facility] 11 minutes ago (10:52 AM)                 If her pain is tolerable, then can wait until 3/23; if she continues to be in significant pain, we can try to move the appt sooner   Gloria Iglesias MD   [facility] (Routing comment)

## 2018-03-07 NOTE — DISCHARGE INSTRUCTIONS
Return if symptoms worsen or new symptoms develop.  Follow-up with primary care physician next available.  Drink plenty fluids.  Take pain medication as directed.  Follow-up with your OB/GYN doctor when he returns from [place].  What Are Fibroids?  Fibroids (also called myomas and leiomyomas) are growths that usually form in the wall of your uterus. Fibroids are the most common tumor in women. They are almost always noncancer (benign) and harmless. Fibroids are made up of connective tissue and muscle cells. They start as pea-sized lumps. But they can grow steadily during your reproductive years. Many fibroids just need to be watched. Others may need treatment if they become too large or cause symptoms.    Possible problems  Fibroids often cause no symptoms. But a fibroid that grows quickly in your uterus can cause 1 or more of the following problems:    Excessive uterine bleeding, leading to a lack of red blood cells (anemia)    Frequent urge to urinate    Trouble having bowel movements    Achiness, heaviness, or fullness    Back or belly (abdominal) pain    Pain during sex    Trouble getting pregnant or being unable to get pregnant    Problems with pregnancy    Enlargement of the lower abdomen  Treatment is tailored for you  No 2 fibroids are the same. The type of treatment you will have depends on several things including:    How many fibroids you have, their size, location, and how fast they grow    How severe your symptoms are    If you plan to have children in the future  There are a growing number of effective ways to treat fibroids. After your medical evaluation, your healthcare provider will talk with you about the best options to solve your particular problem and meet your needs.  Your future checkups  Treating your fibroids is likely to relieve your symptoms. But your healthcare provider will want to check your progress. Ask your provider about any other follow-up visits you might need.   Date Last  Reviewed: 6/1/2017 2000-2017 [employer]. [address] [place] [address], [place] [address]. All rights reserved. This information is not intended as a substitute for professional medical care. Always follow your healthcare professional's instructions.

## 2018-03-19 ENCOUNTER — OFFICE VISIT (OUTPATIENT)
Dept: OBGYN | Facility: CLINIC | Age: 48
End: 2018-03-19
Payer: COMMERCIAL

## 2018-03-19 ENCOUNTER — TELEPHONE (OUTPATIENT)
Dept: OBGYN | Facility: CLINIC | Age: 48
End: 2018-03-19

## 2018-03-19 VITALS
TEMPERATURE: 97 F | BODY MASS INDEX: 22.28 KG/M2 | SYSTOLIC BLOOD PRESSURE: 116 MMHG | DIASTOLIC BLOOD PRESSURE: 77 MMHG | WEIGHT: 147 LBS | RESPIRATION RATE: 18 BRPM | HEART RATE: 85 BPM | HEIGHT: 68 IN

## 2018-03-19 DIAGNOSIS — D25.1 INTRAMURAL AND SUBSEROUS LEIOMYOMA OF UTERUS: Primary | ICD-10-CM

## 2018-03-19 DIAGNOSIS — D25.2 INTRAMURAL AND SUBSEROUS LEIOMYOMA OF UTERUS: Primary | ICD-10-CM

## 2018-03-19 PROCEDURE — 99214 OFFICE O/P EST MOD 30 MIN: CPT | Performed by: OBSTETRICS & GYNECOLOGY

## 2018-03-19 NOTE — MR AVS SNAPSHOT
"              After Visit Summary   3/19/2018    Luna Rivera    MRN: [id number]           Patient Information     Date Of Birth          1970        Visit Information        Provider Department      3/19/2018 9:30 AM Gloria Iglesias MD [facility]        Today's Diagnoses     Intramural and subserous leiomyoma of uterus    -  1       Follow-ups after your visit        Who to contact     If you have questions or need follow up information about today's clinic visit or your schedule please contact [facility] directly at [phone].  Normal or non-critical lab and imaging results will be communicated to you by iAgreehart, letter or phone within 4 business days after the clinic has received the results. If you do not hear from us within 7 days, please contact the clinic through NatureBridget or phone. If you have a critical or abnormal lab result, we will notify you by phone as soon as possible.  Submit refill requests through [employer] or call your pharmacy and they will forward the refill request to us. Please allow 3 business days for your refill to be completed.          Additional Information About Your Visit        MyChart Information     [employer] gives you secure access to your electronic health record. If you see a primary care provider, you can also send messages to your care team and make appointments. If you have questions, please call your primary care clinic.  If you do not have a primary care provider, please call [phone] and they will assist you.        Care EveryWhere ID     This is your Care EveryWhere ID. This could be used by other organizations to access your [place] medical records  [id number]        Your Vitals Were     Pulse Temperature Respirations Height Last Period BMI (Body Mass Index)    85 97  F (36.1  C) (Tympanic) 18 5' 8\" (1.727 m) 02/25/2018 22.35 kg/m2       Blood Pressure from Last 3 Encounters:   03/19/18 116/77   03/06/18 132/78   12/19/17 111/74 "    Weight from Last 3 Encounters:   03/19/18 147 lb (66.7 kg)   03/06/18 145 lb (65.8 kg)   12/19/17 145 lb (65.8 kg)              We Performed the Following     Brianda-Operative Worksheet GYN          Today's Medication Changes          These changes are accurate as of 3/19/18 10:08 AM.  If you have any questions, ask your nurse or doctor.               These medicines have changed or have updated prescriptions.        Dose/Directions    tretinoin 0.025 % topical gel   Commonly known as:  RETIN-A   This may have changed:    - how to take this  - when to take this  - additional instructions   Used for:  Acne, unspecified acne type        Apply  topically At Bedtime.   Quantity:  45 g   Refills:  12                Primary Care Provider Office Phone # Fax #    Catrina Ioana Jin [phone] [address] [address]        Equal Access to Services     [facility] : Hadii lakshmi Pierce, waaxda luqjack, qaybta kaalmada qasim, thierry avitia . So [facility] [phone].    ATENCIÓN: Si habla español, tiene a lema disposición servicios gratuitos de asistencia lingüística. Llame al [phone].    We comply with applicable federal civil rights laws and [place] laws. We do not discriminate on the basis of race, color, national origin, age, disability, sex, sexual orientation, or gender identity.            Thank you!     Thank you for choosing [facility]  for your care. Our goal is always to provide you with excellent care. Hearing back from our patients is one way we can continue to improve our services. Please take a few minutes to complete the written survey that you may receive in the mail after your visit with us. Thank you!             Your Updated Medication List - Protect others around you: Learn how to safely use, store and throw away your medicines at www.disposemymeds.org.          This list is accurate as of 3/19/18 10:08 AM.   Always use your most recent med list.                   Brand Name Dispense Instructions for use Diagnosis    acyclovir 400 MG tablet    ZOVIRAX    180 tablet    TAKE ONE TABLET BY MOUTH TWICE DAILY    Herpes simplex virus (HSV) infection       fluticasone 50 MCG/ACT spray    FLONASE    3 Bottle    Spray 2 sprays into both nostrils daily    Allergic rhinitis       hydrocortisone 2.5 % cream    ANUSOL-HC    28.35 g    Place rectally 2 times daily    External hemorrhoids       oxyCODONE-acetaminophen 5-325 MG per tablet    PERCOCET    8 tablet    Take 1-2 tablets by mouth every 4 hours as needed        polyethylene glycol powder    MIRALAX    510 g    Take 17 g by mouth daily    Chronic constipation       PRILOSEC PO      Take 20 mg by mouth daily as needed        tretinoin 0.025 % topical gel    RETIN-A    45 g    Apply  topically At Bedtime.    Acne, unspecified acne type

## 2018-03-19 NOTE — PATIENT INSTRUCTIONS
"  You are now scheduled for surgery at [facility].  Below are the details for your surgery.  Please read the \"Preparing for Your Surgery\" instructions and let us know if you have any questions.    Surgeon: Gloria Iglesias MD    Surgical Procedure:  Total abdominal hysterectomy with possible bilateral salpingoophorectomy    Date of Procedure: 3-27-18   Time:11:20am    Arrival Time:9:50am   Time can change to be confirmed a couple days prior by pre-op surgery nurse who will be calling.    Home Preparation:  Preop physical exam is needed within 30 days of surgery.  Pre-op Scheduled with ___PCP/FP__________   Date:________     Time:________    Shower with Hibiclens the night before and the morning of surgery, gently cleaning skin from neck to feet - see included instructions.      Take medications with a sip of water the morning of surgery (if approved by your doctor)    May have a light meal, toast and clear liquids, up to 8 hrs before surgery    May have clear liquids (liquids one can read through) up to 4 hours before surgery    Nothing after 4 hours before surgery    Stop aspirin 7-10 days before surgery,   Stop NSAIDS (Ibuprofen, Naproxen, etc) 5 days before surgery.  Stop Plavix 7-10 days before surgery      Sincerely,    [facility]  [phone]    Fax: [phone]  Same Day Surgery [phone]  Fax: [phone]      Pre-Op Appt Date: Patient to schedule within 30 days of surgery  Post-Op Appt Date: To be determined by provider.   Packet sent out: Yes  Pre-cert/Authorization completed: TBD by Financial Securing Office.    MA Sterilization/Hysterectomy Acknowledgment Consent signed: Not Applicable    "

## 2018-03-19 NOTE — PROGRESS NOTES
Luna is a 47 year old   female who presents for advice regarding enlarging fibroid uterus; she has a h/o endometrial ablation in , resulting in regular, 2 day, light periods; in , she had an MRI for an orthopedic issue, and a 4.7cm bilobed fibroid was noted; she was asymptomatic at that time.  More recently, she underwent MRI and subseuqent pelvic sonogram which now show a markedly enlarged uterus with multiple 7-10cm uterine fibroids; she states since Dec, she has felt an enlarging abdomen, with pressure on her bladder and need to urinate frequently; her periods have not changed and continue to be regular and light.  She will periodically get a quite sharp pain in RLQ, especially around mid cycle; she has no plans for more children, with h/o PPTL; she denies hot flashes or night sweats..    Patient Active Problem List    Diagnosis Date Noted     Situational anxiety 2017     Priority: Medium     Grief reaction 2017     Priority: Medium     Ptosis of eyelid, bilateral 2016     Priority: Medium     Gastroesophageal reflux disease without esophagitis 10/24/2016     Priority: Medium     Hip impingement syndrome, unspecified laterality 11/10/2015     Priority: Medium     External hemorrhoids 2015     Priority: Medium     Acne 10/05/2011     Priority: Medium     CARDIOVASCULAR SCREENING; LDL GOAL LESS THAN 160 10/31/2010     Priority: Medium     Menorrhagia 10/28/2009     Priority: Medium     S/p Novasure ablation 3/30/10       Family history of breast cancer in mother 2009     Priority: Medium     Hemorrhoids 2007     Priority: Medium     Problem list name updated by automated process. Provider to review       Herpes simplex virus (HSV) infection      Priority: Medium     Last outbreak   Problem list name updated by automated process. Provider to review       Allergic rhinitis 2005     Priority: Medium     Has had really bad allergies and cannot sleep at  night because tichty and scratchy throat and ears. suring the morning she has blocked congested nose. There is no wheezing or cough. There is sneezing. Usually her symptoms are controlled by calritin but since last two weeks the above aymptoms have been there and are severe.    The symptoms are seasonal and worse at home as comparee d to when she is in the city.    She has tried the sudfed which makes her lightheaded and dizzy             All systems were reviewed and pertinent information in noted in subjective/HPI.    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     Contact dermatitis and other eczema, due to unspecified cause      Enteritis due to [place] virus 2002     Herpes simplex without mention of complication     Last outbreak      Irritable bowel syndrome      Other abnormal Papanicolaou smear of cervix and cervical HPV(795.09)     Laser Tx        Past Surgical History:   Procedure Laterality Date     ARTHROSCOPY KNEE WITH MEDIAL MENISCECTOMY  2014    Procedure: ARTHROSCOPY KNEE WITH MEDIAL MENISCECTOMY;  Surgeon: Alejandro Dodge MD;  Location: [facility]       DELIVERY ONLY  ,      C LIGATE FALLOPIAN TUBE,POSTPARTUM       ESOPHAGOSCOPY, GASTROSCOPY, DUODENOSCOPY (EGD), COMBINED N/A 2016    Procedure: COMBINED ESOPHAGOSCOPY, GASTROSCOPY, DUODENOSCOPY (EGD), BIOPSY SINGLE OR MULTIPLE;  Surgeon: Jose Mora MD;  Location: [facility]      HYSTEROSCOPY, SURGICAL; W/ ENDOMETRIAL ABLATION, ANY METHOD  3/30/10    Novasure ablation         Current Outpatient Prescriptions:      acyclovir (ZOVIRAX) 400 MG tablet, TAKE ONE TABLET BY MOUTH TWICE DAILY, Disp: 180 tablet, Rfl: 0     tretinoin (RETIN-A) 0.025 % topical gel, Apply  topically At Bedtime. (Patient taking differently: Apply topically At Bedtime Apply  topically At Bedtime.), Disp: 45 g, Rfl: 12     fluticasone  (FLONASE) 50 MCG/ACT spray, Spray 2 sprays into both nostrils daily, Disp: 3 Bottle, Rfl: 1     oxyCODONE-acetaminophen (PERCOCET) 5-325 MG per tablet, Take 1-2 tablets by mouth every 4 hours as needed (Patient not taking: Reported on 3/19/2018), Disp: 8 tablet, Rfl: 0     Omeprazole (PRILOSEC PO), Take 20 mg by mouth daily as needed , Disp: , Rfl:      hydrocortisone (ANUSOL-HC) 2.5 % rectal cream, Place rectally 2 times daily (Patient not taking: Reported on 3/19/2018), Disp: 28.35 g, Rfl: 1     polyethylene glycol (MIRALAX) powder, Take 17 g by mouth daily (Patient not taking: Reported on 3/19/2018), Disp: 510 g, Rfl: 1    ALLERGIES:  Compazine and Ancef [cefazolin sodium]    Social History     Social History     Marital status:      Spouse name: N/A     Number of children: 2     Years of education: N/A     Occupational History     Stay at Home Mom [employer]      Social History Main Topics     Smoking status: Never Smoker     Smokeless tobacco: Never Used     Alcohol use Yes      Comment: rarely     Drug use: No     Sexual activity: Yes     Partners: Male     Birth control/ protection: Female Surgical      Comment: BTL     Other Topics Concern     Parent/Sibling W/ Cabg, Mi Or Angioplasty Before 65f 55m? No     Social History Narrative    Home with kids                           Family History   Problem Relation Age of Onset     Breast Cancer Mother      age 46     CANCER Mother      Throat, larynx (d/t radiation from breast cancer)tongue cancer 9/2011     Hypertension Father      CANCER Father      lung CA     Neurologic Disorder Maternal Grandmother      Parkinson's     C.A.D. Maternal Grandmother      Thyroid Disease Maternal Grandmother      hyperthyroid     Arthritis Maternal Grandfather      HEART DISEASE Maternal Grandfather      OSTEOPOROSIS Paternal Grandmother      HEART DISEASE Paternal Grandfather      Cancer - colorectal No family hx of      Prostate Cancer  "No family hx of      CEREBROVASCULAR DISEASE No family hx of      DIABETES No family hx of      Asthma No family hx of        OBJECTIVE:  Vitals: /77 (BP Location: Right arm, Patient Position: Chair, Cuff Size: Adult Small)  Pulse 85  Temp 97  F (36.1  C) (Tympanic)  Resp 18  Ht 5' 8\" (1.727 m)  Wt 147 lb (66.7 kg)  LMP 02/25/2018  BMI 22.35 kg/m2 BMI= Body mass index is 22.35 kg/(m^2).   Patient's last menstrual period was 02/25/2018.     GENERAL APPEARANCE: healthy, alert and no distress     ABDOMEN: uterus 20 week size and nontender; fixed in position  PELVIC:  Cervix hard to access, high at apex of vagina and shifted to left; uterus markedly enlarged, 20 weeks size; unable to palpate ovaries; unable to visualize cervix for Pap or ENDOMETRIAL BIOPSY      ASSESSMENT:      ICD-10-CM    1. Intramural and subserous leiomyoma of uterus D25.1     D25.2        PLAN:  I discussed at length with Luna that the fibroids are quite large, may have grown over a relatively short period of time, most certainly much larger than they were in 2015, and are giving her symptoms; concern would be for a rare leiomyosarcoma; I would recommend we proceed with TOTAL ABDOMINAL HYSTERECTOMY, possible bilateral salpingo-oophorectomy; surgery discussed with pt and she understands and asked appropriate questions; she will consider the status of her ovaries.\Gloria Iglesias MD  [facility]      Gloria Iglesias MD    "

## 2018-03-19 NOTE — TELEPHONE ENCOUNTER
Pt scheduled for surgery Total abdominal hysterectomy with possible bilateral salpingoophorectomy for 3-27-18.  See pt instructions for details.    -Arabella Lay  [facility]

## 2018-03-22 ENCOUNTER — OFFICE VISIT (OUTPATIENT)
Dept: FAMILY MEDICINE | Facility: CLINIC | Age: 48
End: 2018-03-22
Payer: COMMERCIAL

## 2018-03-22 VITALS
DIASTOLIC BLOOD PRESSURE: 83 MMHG | HEIGHT: 68 IN | BODY MASS INDEX: 22.13 KG/M2 | HEART RATE: 91 BPM | TEMPERATURE: 99.3 F | WEIGHT: 146 LBS | SYSTOLIC BLOOD PRESSURE: 114 MMHG

## 2018-03-22 DIAGNOSIS — B07.0 PLANTAR WART OF LEFT FOOT: ICD-10-CM

## 2018-03-22 DIAGNOSIS — B00.9 HERPES SIMPLEX VIRUS (HSV) INFECTION: ICD-10-CM

## 2018-03-22 DIAGNOSIS — Z01.818 PREOP GENERAL PHYSICAL EXAM: Primary | ICD-10-CM

## 2018-03-22 DIAGNOSIS — K64.4 EXTERNAL HEMORRHOIDS: ICD-10-CM

## 2018-03-22 DIAGNOSIS — D25.9 UTERINE LEIOMYOMA, UNSPECIFIED LOCATION: ICD-10-CM

## 2018-03-22 PROCEDURE — 17110 DESTRUCTION B9 LES UP TO 14: CPT | Performed by: NURSE PRACTITIONER

## 2018-03-22 PROCEDURE — 99214 OFFICE O/P EST MOD 30 MIN: CPT | Mod: 25 | Performed by: NURSE PRACTITIONER

## 2018-03-22 RX ORDER — ACYCLOVIR 400 MG/1
400 TABLET ORAL 2 TIMES DAILY
Qty: 180 TABLET | Refills: 3 | Status: SHIPPED | OUTPATIENT
Start: 2018-03-22 | End: 2019-10-11

## 2018-03-22 NOTE — PATIENT INSTRUCTIONS
Before Your Surgery      Call your surgeon if there is any change in your health. This includes signs of a cold or flu (such as a sore throat, runny nose, cough, rash or fever).    Do not smoke, drink alcohol or take over the counter medicine (unless your surgeon or primary care doctor tells you to) for the 24 hours before and after surgery.    If you take prescribed drugs: Follow your doctor s orders about which medicines to take and which to stop until after surgery.    Eating and drinking prior to surgery: follow the instructions from your surgeon    Take a shower or bath the night before surgery. Use the soap your surgeon gave you to gently clean your skin. If you do not have soap from your surgeon, use your regular soap. Do not shave or scrub the surgery site.  Wear clean pajamas and have clean sheets on your bed.     COURTNEY uCriel    Following treatment with liquid nitrogen - your skin may get a blister - try to keep this intact and keep the area clean and dry.  If not blister occurs, it is ok to use a pumice stone or nail file to gently file down the thickened warty tissue.   (do not use the same nail file that you use on your finger nails or it could spread the virus).   You may need to return for additional treatment if the wart does not completely resolve.  Plan to return after roughly 3 weeks once the area has healed, if still showing signs of persistent warty tissue.      May start using OTC treatments (Mediplast or Dr. Joseph) if treatment ineffective.

## 2018-03-22 NOTE — NURSING NOTE
"Chief Complaint   Patient presents with     Pre-Op Exam     Refill Request     acyclovir and anusol cream       Initial /83 (BP Location: Left arm)  Pulse 91  Temp 99.3  F (37.4  C) (Tympanic)  Ht 5' 8\" (1.727 m)  Wt 146 lb (66.2 kg)  LMP 02/25/2018  BMI 22.2 kg/m2 Estimated body mass index is 22.2 kg/(m^2) as calculated from the following:    Height as of this encounter: 5' 8\" (1.727 m).    Weight as of this encounter: 146 lb (66.2 kg).  Medication Reconciliation: complete  "

## 2018-03-22 NOTE — MR AVS SNAPSHOT
After Visit Summary   3/22/2018    Luna Rivera    MRN: [id number]           Patient Information     Date Of Birth          1970        Visit Information        Provider Department      3/22/2018 3:00 PM Catrina Jin NP [facility]        Today's Diagnoses     Preop general physical exam    -  1    Uterine leiomyoma, unspecified location        External hemorrhoids        Herpes simplex virus (HSV) infection        Plantar wart of left foot          Care Instructions      Before Your Surgery      Call your surgeon if there is any change in your health. This includes signs of a cold or flu (such as a sore throat, runny nose, cough, rash or fever).    Do not smoke, drink alcohol or take over the counter medicine (unless your surgeon or primary care doctor tells you to) for the 24 hours before and after surgery.    If you take prescribed drugs: Follow your doctor s orders about which medicines to take and which to stop until after surgery.    Eating and drinking prior to surgery: follow the instructions from your surgeon    Take a shower or bath the night before surgery. Use the soap your surgeon gave you to gently clean your skin. If you do not have soap from your surgeon, use your regular soap. Do not shave or scrub the surgery site.  Wear clean pajamas and have clean sheets on your bed.     COURTNEY Curiel    Following treatment with liquid nitrogen - your skin may get a blister - try to keep this intact and keep the area clean and dry.  If not blister occurs, it is ok to use a pumice stone or nail file to gently file down the thickened warty tissue.   (do not use the same nail file that you use on your finger nails or it could spread the virus).   You may need to return for additional treatment if the wart does not completely resolve.  Plan to return after roughly 3 weeks once the area has healed, if still showing signs of persistent warty tissue.      May start  "using OTC treatments (Mediplast or Dr. Joseph) if treatment ineffective.              Follow-ups after your visit        Your next 10 appointments already scheduled     Mar 27, 2018   Procedure with Gloria Iglesias MD   [facility] (--)    [address] [id number] [phone]           The medical center is located at [address] (between [address] in [place], [place] north of [place]).              Who to contact     If you have questions or need follow up information about today's clinic visit or your schedule please contact [facility] directly at [phone].  Normal or non-critical lab and imaging results will be communicated to you by mon.kihart, letter or phone within 4 business days after the clinic has received the results. If you do not hear from us within 7 days, please contact the clinic through mon.kihart or phone. If you have a critical or abnormal lab result, we will notify you by phone as soon as possible.  Submit refill requests through [employer] or call your pharmacy and they will forward the refill request to us. Please allow 3 business days for your refill to be completed.          Additional Information About Your Visit        mon.kiharCare at Hand Information     [employer] gives you secure access to your electronic health record. If you see a primary care provider, you can also send messages to your care team and make appointments. If you have questions, please call your primary care clinic.  If you do not have a primary care provider, please call [phone] and they will assist you.        Care EveryWhere ID     This is your Care EveryWhere ID. This could be used by other organizations to access your [place] medical records  [id number]        Your Vitals Were     Pulse Temperature Height Last Period BMI (Body Mass Index)       91 99.3  F (37.4  C) (Tympanic) 5' 8\" (1.727 m) 02/25/2018 22.2 kg/m2        Blood Pressure from " Last 3 Encounters:   03/22/18 114/83   03/19/18 116/77   03/06/18 132/78    Weight from Last 3 Encounters:   03/22/18 146 lb (66.2 kg)   03/19/18 147 lb (66.7 kg)   03/06/18 145 lb (65.8 kg)              We Performed the Following     DESTRUCT BENIGN LESION, UP TO 14          Today's Medication Changes          These changes are accurate as of 3/22/18  3:39 PM.  If you have any questions, ask your nurse or doctor.               These medicines have changed or have updated prescriptions.        Dose/Directions    acyclovir 400 MG tablet   Commonly known as:  ZOVIRAX   This may have changed:  See the new instructions.   Used for:  Herpes simplex virus (HSV) infection   Changed by:  Catrina Jin NP        Dose:  400 mg   Take 1 tablet (400 mg) by mouth 2 times daily   Quantity:  180 tablet   Refills:  3       tretinoin 0.025 % topical gel   Commonly known as:  RETIN-A   This may have changed:    - how to take this  - when to take this  - additional instructions   Used for:  Acne, unspecified acne type        Apply  topically At Bedtime.   Quantity:  45 g   Refills:  12            Where to get your medicines      These medications were sent to [facility] [address] [place] [address] [address]  [address] [facility] [address]     Phone:  [phone]     acyclovir 400 MG tablet    hydrocortisone 2.5 % cream                Primary Care Provider Office Phone # Fax #    Catrina Jin [phone] [address] [address]        Equal Access to Services     ANA PRIETO : Hadii lakshmi baxtero Sokarin, waaxda luqadaha, qaybta kaalmada adeopalyatammi, thierry you. So [facility] [phone].    ATENCIÓN: Si habla español, tiene a lema disposición servicios gratuitos de asistencia lingüística. Llame al [phone].    We comply with applicable federal civil rights laws and [place] laws. We do not discriminate on the basis of race, color,  national origin, age, disability, sex, sexual orientation, or gender identity.            Thank you!     Thank you for choosing [facility]  for your care. Our goal is always to provide you with excellent care. Hearing back from our patients is one way we can continue to improve our services. Please take a few minutes to complete the written survey that you may receive in the mail after your visit with us. Thank you!             Your Updated Medication List - Protect others around you: Learn how to safely use, store and throw away your medicines at www.disposemymeds.org.          This list is accurate as of 3/22/18  3:39 PM.  Always use your most recent med list.                   Brand Name Dispense Instructions for use Diagnosis    acyclovir 400 MG tablet    ZOVIRAX    180 tablet    Take 1 tablet (400 mg) by mouth 2 times daily    Herpes simplex virus (HSV) infection       fluticasone 50 MCG/ACT spray    FLONASE    3 Bottle    Spray 2 sprays into both nostrils daily    Allergic rhinitis       hydrocortisone 2.5 % cream    ANUSOL-HC    28.35 g    Place rectally 2 times daily    External hemorrhoids       PRILOSEC PO      Take 20 mg by mouth daily as needed        tretinoin 0.025 % topical gel    RETIN-A    45 g    Apply  topically At Bedtime.    Acne, unspecified acne type

## 2018-03-22 NOTE — PROGRESS NOTES
[facility]  [address] [address]  [phone]  Dept: [phone]    PRE-OP EVALUATION:  Today's date: 3/22/2018    Luna Rivera (: 1970) presents for pre-operative evaluation assessment as requested by Dr. Gloria Iglesias.  She requires evaluation and anesthesia risk assessment prior to undergoing surgery/procedure for treatment of enlarging fibroid uterus.      Proposed Surgery/ Procedure: COMBINED HYSTERECTOMY TOTAL ABDOMINAL, SALPINGO-OOPHORECTOMY.  Date of Surgery/ Procedure: 3/27/18  Time of Surgery/ Procedure: 11:20AM  Hospital/Surgical Facility: [place]   Fax number for surgical facility:   Primary Physician: Catrina Jin  Type of Anesthesia Anticipated: Other     Patient has a Health Care Directive or Living Will:  NO    1. NO - Do you have a history of heart attack, stroke, stent, bypass or surgery on an artery in the head, neck, heart or legs?  2. NO - Do you ever have any pain or discomfort in your chest?  3. NO - Do you have a history of  Heart Failure?  4. NO - Are you troubled by shortness of breath when: walking on the level, up a slight hill or at night?  5. NO - Do you currently have a cold, bronchitis or other respiratory infection?  6. NO - Do you have a cough, shortness of breath or wheezing?  7. NO - Do you sometimes get pains in the calves of your legs when you walk?  8. NO - Do you or anyone in your family have previous history of blood clots?  9. NO - Do you or does anyone in your family have a serious bleeding problem such as prolonged bleeding following surgeries or cuts?  10. NO - Have you ever had problems with anemia or been told to take iron pills?  11. NO - Have you had any abnormal blood loss such as black, tarry or bloody stools, or abnormal vaginal bleeding?  12. NO - Have you ever had a blood transfusion?  13. NO - Have you or any of your relatives ever had problems with anesthesia?  14. NO - Do you have sleep apnea,  excessive snoring or daytime drowsiness?  15. NO - Do you have any prosthetic heart valves?  16. NO - Do you have prosthetic joints?  17. NO - Is there any chance that you may be pregnant?      HPI:     HPI related to upcoming procedure: history of ablation for menorrhagia, but with ongoing lower abdominal pain mostly with ovulation and  Cycles.    US done 3/6/2018 showed:  PELVIC ULTRASOUND WITH ENDOVAGINAL TRANSDUCER    3/6/2018 10:24 PM      HISTORY: Masses in pelvis, try to differentiate uterine versus ovarian  in nature.     TECHNIQUE: Endovaginal sonography was added to the transabdominal exam  to better evaluate the uterus and ovaries.     COMPARISON: CT abdomen and pelvis 3/6/2018.     FINDINGS: Endometrium is 6 mm thick. Uterus measures 10 x 4.8 x 7.3  cm. Large uterine fibroids identified again. 10.6 x 9.2 x 10.7 cm  posterior lower uterine fibroid appears exophytic and is subserosal.  8.6 x 6.5 x 7.3 cm uterine fibroid at the posterior distal uterus also  appears subserosal. 4.1 x 7.2 x 3.2 cm anterior uterine body  intramural fibroid. Right and left ovaries not able to be visualized.  No free fluid.         IMPRESSION: Multiple presumed large uterine fibroids. The large sizes  of these lesions limits assessment. Right and left ovaries not able to  be identified.     JOCELYN ADAIR MD      See problem list for active medical problems.  Problems all longstanding and stable, except as noted/documented.  See ROS for pertinent symptoms related to these conditions.                                                                                                  .    MEDICAL HISTORY:     Patient Active Problem List    Diagnosis Date Noted     Intramural and subserous leiomyoma of uterus 03/19/2018     Priority: Medium     Situational anxiety 08/17/2017     Priority: Medium     Grief reaction 08/17/2017     Priority: Medium     Ptosis of eyelid, bilateral 11/23/2016     Priority: Medium     Gastroesophageal reflux  disease without esophagitis 10/24/2016     Priority: Medium     Hip impingement syndrome, unspecified laterality 11/10/2015     Priority: Medium     External hemorrhoids 05/06/2015     Priority: Medium     Acne 10/05/2011     Priority: Medium     CARDIOVASCULAR SCREENING; LDL GOAL LESS THAN 160 10/31/2010     Priority: Medium     Family history of breast cancer in mother 04/01/2009     Priority: Medium     Hemorrhoids 09/20/2007     Priority: Medium     Problem list name updated by automated process. Provider to review       Herpes simplex virus (HSV) infection      Priority: Medium     Last outbreak 1/05  Problem list name updated by automated process. Provider to review       Allergic rhinitis 08/31/2005     Priority: Medium     Has had really bad allergies and cannot sleep at night because tichty and scratchy throat and ears. suring the morning she has blocked congested nose. There is no wheezing or cough. There is sneezing. Usually her symptoms are controlled by calritin but since last two weeks the above aymptoms have been there and are severe.    The symptoms are seasonal and worse at home as comparee d to when she is in the city.    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     Contact dermatitis and other eczema, due to unspecified cause      Enteritis due to [place] virus 12/2002     Herpes simplex without mention of complication     Last outbreak 1/05     Irritable bowel syndrome      Other abnormal Papanicolaou smear of cervix and cervical HPV(795.09) 1990    Laser Tx      PONV (postoperative nausea and vomiting)      Past Surgical History:   Procedure Laterality Date     ARTHROSCOPY KNEE WITH MEDIAL MENISCECTOMY  6/20/2014    Procedure: ARTHROSCOPY KNEE WITH MEDIAL MENISCECTOMY;  Surgeon: Alejandro Dodge MD;  Location: [facility] OR  "    C  DELIVERY ONLY  ,      C LIGATE FALLOPIAN TUBE,POSTPARTUM       ESOPHAGOSCOPY, GASTROSCOPY, DUODENOSCOPY (EGD), COMBINED N/A 2016    Procedure: COMBINED ESOPHAGOSCOPY, GASTROSCOPY, DUODENOSCOPY (EGD), BIOPSY SINGLE OR MULTIPLE;  Surgeon: Jose Mora MD;  Location: [facility] HYSTEROSCOPY, SURGICAL; W/ ENDOMETRIAL ABLATION, ANY METHOD  3/30/10    Novasure ablation     Current Outpatient Prescriptions   Medication Sig Dispense Refill     hydrocortisone (ANUSOL-HC) 2.5 % cream Place rectally 2 times daily 28.35 g 1     acyclovir (ZOVIRAX) 400 MG tablet Take 1 tablet (400 mg) by mouth 2 times daily 180 tablet 3     tretinoin (RETIN-A) 0.025 % topical gel Apply  topically At Bedtime. (Patient taking differently: Apply topically At Bedtime Apply  topically At Bedtime.) 45 g 12     Omeprazole (PRILOSEC PO) Take 20 mg by mouth daily as needed        fluticasone (FLONASE) 50 MCG/ACT spray Spray 2 sprays into both nostrils daily 3 Bottle 1     [DISCONTINUED] acyclovir (ZOVIRAX) 400 MG tablet TAKE ONE TABLET BY MOUTH TWICE DAILY 180 tablet 0     OTC products: None, except as noted above    Allergies   Allergen Reactions     Compazine Fatigue     Neurological s/s-can't wake up     Ancef [Cefazolin Sodium] Hives      Latex Allergy: NO    Social History   Substance Use Topics     Smoking status: Never Smoker     Smokeless tobacco: Never Used     Alcohol use Yes      Comment: rarely     History   Drug Use No       REVIEW OF SYSTEMS:   Constitutional, neuro, ENT, endocrine, pulmonary, cardiac, gastrointestinal, genitourinary, musculoskeletal, integument and psychiatric systems are negative, except as otherwise noted.    EXAM:   /83 (BP Location: Left arm)  Pulse 91  Temp 99.3  F (37.4  C) (Tympanic)  Ht 5' 8\" (1.727 m)  Wt 146 lb (66.2 kg)  LMP 2018  BMI 22.2 kg/m2    GENERAL APPEARANCE: healthy, alert and no distress     HENT: ear canals and TM's normal and nose and mouth " without ulcers or lesions     NECK: no adenopathy, no asymmetry, masses, or scars and thyroid normal to palpation     RESP: lungs clear to auscultation - no rales, rhonchi or wheezes     CV: regular rates and rhythm, normal S1 S2, no S3 or S4 and no murmur, click or rub     ABDOMEN:  soft, nontender, no HSM or masses and bowel sounds normal     MS: extremities normal- no gross deformities noted, no evidence of inflammation in joints, FROM in all extremities.     SKIN: no suspicious lesions or rashes  After a complete discussion of the multiple treatment options for warts including the risks and bennefits of each, the patient has decided on treatment with Liquid nitrogen.  Each wart was frozen easily three times with liquid nitrogen. .  A total of 1 warts are treated today.       NEURO: Normal strength and tone, sensory exam grossly normal, mentation intact and speech normal     PSYCH: mentation appears normal. and affect normal/bright    DIAGNOSTICS:     EKG: Not indicated due to non-vascular surgery and low risk of event (age <65 and without cardiac risk factors)  Labs Drawn and in Process:   Unresulted Labs Ordered in the Past 30 Days of this Admission     No orders found from 1/21/2018 to 3/23/2018.          Recent Labs   Lab Test  03/06/18   1910  10/24/16   1214  05/06/15   0858   HGB  15.8*   --   14.0   PLT  300   --   234   NA  138  139  139   POTASSIUM  3.4  4.1  3.9   CR  0.84  0.84  0.84        IMPRESSION:   Reason for surgery/procedure:  treatment of enlarging fibroid uterus.      Proposed Surgery/ Procedure: COMBINED HYSTERECTOMY TOTAL ABDOMINAL, SALPINGO-OOPHORECTOMY.  Diagnosis/reason for consult: pre operative consult    The proposed surgical procedure is considered INTERMEDIATE risk.    REVISED CARDIAC RISK INDEX  The patient has the following serious cardiovascular risks for perioperative complications such as (MI, PE, VFib and 3  AV Block):  No serious cardiac risks  INTERPRETATION: 0 risks: Class  I (very low risk - 0.4% complication rate)    The patient has the following additional risks for perioperative complications:  No identified additional risks  The ASCVD Risk score (Aleksandar NEHAL Jr, et al., 2013) failed to calculate for the following reasons:    Cannot find a previous HDL lab    Cannot find a previous total cholesterol lab      ICD-10-CM    1. Preop general physical exam Z01.818    2. Uterine leiomyoma, unspecified location D25.9    3. External hemorrhoids K64.4 hydrocortisone (ANUSOL-HC) 2.5 % cream   4. Herpes simplex virus (HSV) infection B00.9 acyclovir (ZOVIRAX) 400 MG tablet   5. Plantar wart of left foot B07.0 DESTRUCT BENIGN LESION, UP TO 14       RECOMMENDATIONS:     --Consult hospital rounder / IM to assist post-op medical management    --Patient is to take all scheduled medications on the day of surgery EXCEPT for modifications listed below.    APPROVAL GIVEN to proceed with proposed procedure, without further diagnostic evaluation       Signed Electronically by: Catrina Jin NP    Copy of this evaluation report is provided to requesting physician.    [facility] Preop Guidelines

## 2018-03-23 ENCOUNTER — ANESTHESIA EVENT (OUTPATIENT)
Dept: SURGERY | Facility: CLINIC | Age: 48
End: 2018-03-23
Payer: COMMERCIAL

## 2018-03-23 NOTE — ADDENDUM NOTE
Encounter addended by: Cristiana Smith, PT on: 3/23/2018 11:57 AM<BR>     Actions taken: Flowsheet accepted, Sign clinical note, Episode resolved

## 2018-03-23 NOTE — PROGRESS NOTES
"Outpatient Physical Therapy Discharge Note     Patient: Luna Rivera  : 1970    Beginning/End Dates of Reporting Period:  17 to 17 when last seen. D/C written on 3/23/2018. Total # of Rx sessions: 9    Referring Provider: Catrina Jin Diagnosis: Acute P in R shoulder      Client Self Report: Not as bad as last week but P on L, stiff on R.     Objective Measurements:  Objective Measure: SPADI   Details: 17  Score 20.77    10/18/17  23/85.     INITIALLY: 33.85%     Objective Measure: AROM  Details:  17 R Flex 132, Abd 142 w/ Pinch at end range. IR T12 w/P and stiffness of R shoulder. ER Normal.   INITIALLY: R Flex 155 w/ P, Abd 150 w/ P, IR T12 w/P, ER NOrmal     Objective Measure: MMT  Details: 17 Abd at 90 5-, IR 5 and No P.      INITIALLY:Abd at 90 4 w/P; IR 5-     Objective Measure: Special Tests  Details: 17 Bicipital Tendonitis +, NEER's w/ Flex and Abd, Crossover.    INITIALLY:  Bicip Tendonitis, NEER's w/ Flex and Abd, K-H, O-Alexander's Weak w/ Supination, Sulcus, Crossover.     Objective Measure: CROM:   Details: 17 Flex 1 1/2\" and Stiff, Ext 25% w/ P mid to L C Spine. SB R 35, L 33 w/ P L CSpine;  Rot R 55, L 55 w/ P just to the L of mid spine.     Objective Measure: Spec Tests/Palpation   Details: Downglide R C3 to C6, Upglide R C4 to C6. Tight R 1st Rib.  B Lev, L>R UTrap. Tight L Scalene.     Goals:  Goal Identifier 1   Goal Description STG: Place an object on a high shelf 2/10  12/20/17  Rated 5/10, NOT MET    Target Date 10/18/17   Date Met      Progress:     Goal Identifier 2   Goal Description STG: Carrying heavy object 2/10 12/20/17 3/10 on Functional Scale. NOT MET    Target Date 10/18/17   Date Met      Progress:     Goal Identifier 3   Goal Description STG: Washing hair 1/10.  17 MET - Rated 0/10     Target Date 10/18/17   Date Met  10/18/17   Progress:     Goal Identifier 4   Goal Description LTG: Pt will be independnet w/ " HEP to manage R shoulder weakness, P for better function and use of R U/E.   Target Date 11/10/17   Date Met      Progress:     Progress Toward Goals:   Progress this reporting period: Pt has met 1 goals.     Plan:  Discharge from therapy.    Discharge:    Reason for Discharge: Patient chooses to discontinue therapy.  Patient has failed to schedule further appointments.    Equipment Issued:      Discharge Plan: Patient to continue home program.  Pt to follow up w/MD as appropriate.   Cristiana Smith, PT, [facility] (#[id number])  [facility]           [phone]  Fax          [phone]  Appt #      [phone]

## 2018-03-24 ENCOUNTER — HEALTH MAINTENANCE LETTER (OUTPATIENT)
Age: 48
End: 2018-03-24

## 2018-03-27 ENCOUNTER — HOSPITAL ENCOUNTER (INPATIENT)
Facility: CLINIC | Age: 48
LOS: 3 days | Discharge: HOME | End: 2018-03-30
Attending: OBSTETRICS & GYNECOLOGY | Admitting: OBSTETRICS & GYNECOLOGY
Payer: COMMERCIAL

## 2018-03-27 ENCOUNTER — ANESTHESIA (OUTPATIENT)
Dept: SURGERY | Facility: CLINIC | Age: 48
End: 2018-03-27
Payer: COMMERCIAL

## 2018-03-27 DIAGNOSIS — Z98.890 POSTOPERATIVE STATE: Primary | ICD-10-CM

## 2018-03-27 PROBLEM — Z90.710 S/P TAH (TOTAL ABDOMINAL HYSTERECTOMY): Status: ACTIVE | Noted: 2018-03-27

## 2018-03-27 LAB
ABO + RH BLD: NORMAL
ABO + RH BLD: NORMAL
B-HCG SERPL-ACNC: <1 IU/L (ref 0–5)
BLD GP AB SCN SERPL QL: NORMAL
BLOOD BANK CMNT PATIENT-IMP: NORMAL
ERYTHROCYTE [DISTWIDTH] IN BLOOD BY AUTOMATED COUNT: 13 % (ref 10–15)
HCT VFR BLD AUTO: 42.2 % (ref 35–47)
HGB BLD-MCNC: 14.5 G/DL (ref 11.7–15.7)
MCH RBC QN AUTO: 30.4 PG (ref 26.5–33)
MCHC RBC AUTO-ENTMCNC: 34.4 G/DL (ref 31.5–36.5)
MCV RBC AUTO: 89 FL (ref 78–100)
PLATELET # BLD AUTO: 224 10E9/L (ref 150–450)
RBC # BLD AUTO: 4.77 10E12/L (ref 3.8–5.2)
SPECIMEN EXP DATE BLD: NORMAL
WBC # BLD AUTO: 3.6 10E9/L (ref 4–11)

## 2018-03-27 PROCEDURE — 25000564 ZZH DESFLURANE, EA 15 MIN: Performed by: OBSTETRICS & GYNECOLOGY

## 2018-03-27 PROCEDURE — 86850 RBC ANTIBODY SCREEN: CPT | Performed by: OBSTETRICS & GYNECOLOGY

## 2018-03-27 PROCEDURE — 25000128 H RX IP 250 OP 636: Performed by: NURSE ANESTHETIST, CERTIFIED REGISTERED

## 2018-03-27 PROCEDURE — 71000012 ZZH RECOVERY PHASE 1 LEVEL 1 FIRST HR: Performed by: OBSTETRICS & GYNECOLOGY

## 2018-03-27 PROCEDURE — 25000125 ZZHC RX 250: Performed by: NURSE ANESTHETIST, CERTIFIED REGISTERED

## 2018-03-27 PROCEDURE — 0UT90ZZ RESECTION OF UTERUS, OPEN APPROACH: ICD-10-PCS | Performed by: OBSTETRICS & GYNECOLOGY

## 2018-03-27 PROCEDURE — 25000132 ZZH RX MED GY IP 250 OP 250 PS 637: Performed by: OBSTETRICS & GYNECOLOGY

## 2018-03-27 PROCEDURE — 25000128 H RX IP 250 OP 636: Performed by: OBSTETRICS & GYNECOLOGY

## 2018-03-27 PROCEDURE — 36415 COLL VENOUS BLD VENIPUNCTURE: CPT | Performed by: OBSTETRICS & GYNECOLOGY

## 2018-03-27 PROCEDURE — 71000013 ZZH RECOVERY PHASE 1 LEVEL 1 EA ADDTL HR: Performed by: OBSTETRICS & GYNECOLOGY

## 2018-03-27 PROCEDURE — 88307 TISSUE EXAM BY PATHOLOGIST: CPT | Performed by: OBSTETRICS & GYNECOLOGY

## 2018-03-27 PROCEDURE — 86900 BLOOD TYPING SEROLOGIC ABO: CPT | Performed by: OBSTETRICS & GYNECOLOGY

## 2018-03-27 PROCEDURE — 86901 BLOOD TYPING SEROLOGIC RH(D): CPT | Performed by: OBSTETRICS & GYNECOLOGY

## 2018-03-27 PROCEDURE — 12000027 ZZH R&B OB

## 2018-03-27 PROCEDURE — 25000125 ZZHC RX 250: Performed by: OBSTETRICS & GYNECOLOGY

## 2018-03-27 PROCEDURE — 58150 TOTAL HYSTERECTOMY: CPT | Performed by: OBSTETRICS & GYNECOLOGY

## 2018-03-27 PROCEDURE — 85027 COMPLETE CBC AUTOMATED: CPT | Performed by: OBSTETRICS & GYNECOLOGY

## 2018-03-27 PROCEDURE — 37000008 ZZH ANESTHESIA TECHNICAL FEE, 1ST 30 MIN: Performed by: OBSTETRICS & GYNECOLOGY

## 2018-03-27 PROCEDURE — 88307 TISSUE EXAM BY PATHOLOGIST: CPT | Mod: 26 | Performed by: OBSTETRICS & GYNECOLOGY

## 2018-03-27 PROCEDURE — 37000009 ZZH ANESTHESIA TECHNICAL FEE, EACH ADDTL 15 MIN: Performed by: OBSTETRICS & GYNECOLOGY

## 2018-03-27 PROCEDURE — 84702 CHORIONIC GONADOTROPIN TEST: CPT | Performed by: OBSTETRICS & GYNECOLOGY

## 2018-03-27 PROCEDURE — 27210794 ZZH OR GENERAL SUPPLY STERILE: Performed by: OBSTETRICS & GYNECOLOGY

## 2018-03-27 PROCEDURE — 40000305 ZZH STATISTIC PRE PROC ASSESS I: Performed by: OBSTETRICS & GYNECOLOGY

## 2018-03-27 PROCEDURE — 36000056 ZZH SURGERY LEVEL 3 1ST 30 MIN: Performed by: OBSTETRICS & GYNECOLOGY

## 2018-03-27 PROCEDURE — 58150 TOTAL HYSTERECTOMY: CPT | Mod: 80 | Performed by: OBSTETRICS & GYNECOLOGY

## 2018-03-27 PROCEDURE — 36000058 ZZH SURGERY LEVEL 3 EA 15 ADDTL MIN: Performed by: OBSTETRICS & GYNECOLOGY

## 2018-03-27 PROCEDURE — 27110028 ZZH OR GENERAL SUPPLY NON-STERILE: Performed by: OBSTETRICS & GYNECOLOGY

## 2018-03-27 RX ORDER — ONDANSETRON 2 MG/ML
4 INJECTION INTRAMUSCULAR; INTRAVENOUS EVERY 6 HOURS PRN
Status: DISCONTINUED | OUTPATIENT
Start: 2018-03-27 | End: 2018-03-30 | Stop reason: HOSPADM

## 2018-03-27 RX ORDER — CLINDAMYCIN PHOSPHATE 900 MG/50ML
900 INJECTION, SOLUTION INTRAVENOUS SEE ADMIN INSTRUCTIONS
Status: DISCONTINUED | OUTPATIENT
Start: 2018-03-27 | End: 2018-03-27 | Stop reason: HOSPADM

## 2018-03-27 RX ORDER — ONDANSETRON 2 MG/ML
INJECTION INTRAMUSCULAR; INTRAVENOUS PRN
Status: DISCONTINUED | OUTPATIENT
Start: 2018-03-27 | End: 2018-03-27

## 2018-03-27 RX ORDER — DOCUSATE SODIUM 100 MG/1
100 CAPSULE, LIQUID FILLED ORAL 2 TIMES DAILY
Status: DISCONTINUED | OUTPATIENT
Start: 2018-03-27 | End: 2018-03-30 | Stop reason: HOSPADM

## 2018-03-27 RX ORDER — MEPERIDINE HYDROCHLORIDE 25 MG/ML
12.5 INJECTION INTRAMUSCULAR; INTRAVENOUS; SUBCUTANEOUS EVERY 5 MIN PRN
Status: COMPLETED | OUTPATIENT
Start: 2018-03-27 | End: 2018-03-27

## 2018-03-27 RX ORDER — BUPIVACAINE HYDROCHLORIDE AND EPINEPHRINE 5; 5 MG/ML; UG/ML
INJECTION, SOLUTION PERINEURAL PRN
Status: DISCONTINUED | OUTPATIENT
Start: 2018-03-27 | End: 2018-03-27 | Stop reason: HOSPADM

## 2018-03-27 RX ORDER — NALOXONE HYDROCHLORIDE 0.4 MG/ML
.1-.4 INJECTION, SOLUTION INTRAMUSCULAR; INTRAVENOUS; SUBCUTANEOUS
Status: DISCONTINUED | OUTPATIENT
Start: 2018-03-27 | End: 2018-03-30 | Stop reason: HOSPADM

## 2018-03-27 RX ORDER — KETOROLAC TROMETHAMINE 30 MG/ML
INJECTION, SOLUTION INTRAMUSCULAR; INTRAVENOUS PRN
Status: DISCONTINUED | OUTPATIENT
Start: 2018-03-27 | End: 2018-03-27

## 2018-03-27 RX ORDER — METOCLOPRAMIDE 10 MG/1
10 TABLET ORAL EVERY 6 HOURS PRN
Status: DISCONTINUED | OUTPATIENT
Start: 2018-03-27 | End: 2018-03-30 | Stop reason: HOSPADM

## 2018-03-27 RX ORDER — HYDROMORPHONE HYDROCHLORIDE 1 MG/ML
.3-.5 INJECTION, SOLUTION INTRAMUSCULAR; INTRAVENOUS; SUBCUTANEOUS EVERY 5 MIN PRN
Status: DISCONTINUED | OUTPATIENT
Start: 2018-03-27 | End: 2018-03-27 | Stop reason: HOSPADM

## 2018-03-27 RX ORDER — METOCLOPRAMIDE HYDROCHLORIDE 5 MG/ML
10 INJECTION INTRAMUSCULAR; INTRAVENOUS EVERY 6 HOURS PRN
Status: DISCONTINUED | OUTPATIENT
Start: 2018-03-27 | End: 2018-03-30 | Stop reason: HOSPADM

## 2018-03-27 RX ORDER — ONDANSETRON 4 MG/1
4 TABLET, ORALLY DISINTEGRATING ORAL EVERY 30 MIN PRN
Status: DISCONTINUED | OUTPATIENT
Start: 2018-03-27 | End: 2018-03-27 | Stop reason: HOSPADM

## 2018-03-27 RX ORDER — NALOXONE HYDROCHLORIDE 0.4 MG/ML
.1-.4 INJECTION, SOLUTION INTRAMUSCULAR; INTRAVENOUS; SUBCUTANEOUS
Status: ACTIVE | OUTPATIENT
Start: 2018-03-27 | End: 2018-03-28

## 2018-03-27 RX ORDER — SODIUM CHLORIDE, SODIUM LACTATE, POTASSIUM CHLORIDE, CALCIUM CHLORIDE 600; 310; 30; 20 MG/100ML; MG/100ML; MG/100ML; MG/100ML
INJECTION, SOLUTION INTRAVENOUS CONTINUOUS
Status: DISCONTINUED | OUTPATIENT
Start: 2018-03-27 | End: 2018-03-30 | Stop reason: HOSPADM

## 2018-03-27 RX ORDER — KETOROLAC TROMETHAMINE 30 MG/ML
30 INJECTION, SOLUTION INTRAMUSCULAR; INTRAVENOUS EVERY 6 HOURS PRN
Status: DISCONTINUED | OUTPATIENT
Start: 2018-03-27 | End: 2018-03-30 | Stop reason: HOSPADM

## 2018-03-27 RX ORDER — ONDANSETRON 4 MG/1
4 TABLET, ORALLY DISINTEGRATING ORAL EVERY 6 HOURS PRN
Status: DISCONTINUED | OUTPATIENT
Start: 2018-03-27 | End: 2018-03-30 | Stop reason: HOSPADM

## 2018-03-27 RX ORDER — OXYCODONE AND ACETAMINOPHEN 5; 325 MG/1; MG/1
1 TABLET ORAL EVERY 6 HOURS PRN
Refills: 0 | Status: ON HOLD | COMMUNITY
Start: 2018-03-06 | End: 2018-03-30

## 2018-03-27 RX ORDER — SODIUM CHLORIDE, SODIUM LACTATE, POTASSIUM CHLORIDE, CALCIUM CHLORIDE 600; 310; 30; 20 MG/100ML; MG/100ML; MG/100ML; MG/100ML
INJECTION, SOLUTION INTRAVENOUS CONTINUOUS
Status: DISCONTINUED | OUTPATIENT
Start: 2018-03-27 | End: 2018-03-27 | Stop reason: HOSPADM

## 2018-03-27 RX ORDER — FENTANYL CITRATE 50 UG/ML
INJECTION, SOLUTION INTRAMUSCULAR; INTRAVENOUS PRN
Status: DISCONTINUED | OUTPATIENT
Start: 2018-03-27 | End: 2018-03-27

## 2018-03-27 RX ORDER — SCOLOPAMINE TRANSDERMAL SYSTEM 1 MG/1
1 PATCH, EXTENDED RELEASE TRANSDERMAL
Status: DISCONTINUED | OUTPATIENT
Start: 2018-03-27 | End: 2018-03-27 | Stop reason: HOSPADM

## 2018-03-27 RX ORDER — GLYCOPYRROLATE 0.2 MG/ML
INJECTION, SOLUTION INTRAMUSCULAR; INTRAVENOUS PRN
Status: DISCONTINUED | OUTPATIENT
Start: 2018-03-27 | End: 2018-03-27

## 2018-03-27 RX ORDER — FENTANYL CITRATE 50 UG/ML
25-50 INJECTION, SOLUTION INTRAMUSCULAR; INTRAVENOUS
Status: DISCONTINUED | OUTPATIENT
Start: 2018-03-27 | End: 2018-03-27 | Stop reason: HOSPADM

## 2018-03-27 RX ORDER — PROPOFOL 10 MG/ML
INJECTION, EMULSION INTRAVENOUS PRN
Status: DISCONTINUED | OUTPATIENT
Start: 2018-03-27 | End: 2018-03-27

## 2018-03-27 RX ORDER — HYDROXYZINE HYDROCHLORIDE 25 MG/1
25 TABLET, FILM COATED ORAL EVERY 6 HOURS PRN
Status: DISCONTINUED | OUTPATIENT
Start: 2018-03-27 | End: 2018-03-28

## 2018-03-27 RX ORDER — CLINDAMYCIN PHOSPHATE 900 MG/50ML
900 INJECTION, SOLUTION INTRAVENOUS
Status: DISCONTINUED | OUTPATIENT
Start: 2018-03-27 | End: 2018-03-27 | Stop reason: HOSPADM

## 2018-03-27 RX ORDER — DIMENHYDRINATE 50 MG/ML
25 INJECTION, SOLUTION INTRAMUSCULAR; INTRAVENOUS
Status: DISCONTINUED | OUTPATIENT
Start: 2018-03-27 | End: 2018-03-27 | Stop reason: HOSPADM

## 2018-03-27 RX ORDER — SIMETHICONE 80 MG
80 TABLET,CHEWABLE ORAL EVERY 6 HOURS PRN
Status: DISCONTINUED | OUTPATIENT
Start: 2018-03-27 | End: 2018-03-30 | Stop reason: HOSPADM

## 2018-03-27 RX ORDER — OXYCODONE HYDROCHLORIDE 5 MG/1
5-10 TABLET ORAL EVERY 4 HOURS PRN
Status: DISCONTINUED | OUTPATIENT
Start: 2018-03-27 | End: 2018-03-30 | Stop reason: HOSPADM

## 2018-03-27 RX ORDER — FLUTICASONE PROPIONATE 50 MCG
2 SPRAY, SUSPENSION (ML) NASAL DAILY
Status: DISCONTINUED | OUTPATIENT
Start: 2018-03-27 | End: 2018-03-30 | Stop reason: HOSPADM

## 2018-03-27 RX ORDER — LIDOCAINE 40 MG/G
CREAM TOPICAL
Status: DISCONTINUED | OUTPATIENT
Start: 2018-03-27 | End: 2018-03-27 | Stop reason: HOSPADM

## 2018-03-27 RX ORDER — LIDOCAINE HYDROCHLORIDE 10 MG/ML
INJECTION, SOLUTION INFILTRATION; PERINEURAL PRN
Status: DISCONTINUED | OUTPATIENT
Start: 2018-03-27 | End: 2018-03-27

## 2018-03-27 RX ORDER — LIDOCAINE 40 MG/G
CREAM TOPICAL
Status: DISCONTINUED | OUTPATIENT
Start: 2018-03-27 | End: 2018-03-30 | Stop reason: HOSPADM

## 2018-03-27 RX ORDER — ALBUTEROL SULFATE 0.83 MG/ML
2.5 SOLUTION RESPIRATORY (INHALATION) EVERY 4 HOURS PRN
Status: DISCONTINUED | OUTPATIENT
Start: 2018-03-27 | End: 2018-03-27 | Stop reason: HOSPADM

## 2018-03-27 RX ORDER — ONDANSETRON 2 MG/ML
4 INJECTION INTRAMUSCULAR; INTRAVENOUS EVERY 30 MIN PRN
Status: DISCONTINUED | OUTPATIENT
Start: 2018-03-27 | End: 2018-03-27 | Stop reason: HOSPADM

## 2018-03-27 RX ORDER — PHENAZOPYRIDINE HYDROCHLORIDE 200 MG/1
200 TABLET, FILM COATED ORAL ONCE
Status: COMPLETED | OUTPATIENT
Start: 2018-03-27 | End: 2018-03-27

## 2018-03-27 RX ORDER — DEXAMETHASONE SODIUM PHOSPHATE 4 MG/ML
INJECTION, SOLUTION INTRA-ARTICULAR; INTRALESIONAL; INTRAMUSCULAR; INTRAVENOUS; SOFT TISSUE PRN
Status: DISCONTINUED | OUTPATIENT
Start: 2018-03-27 | End: 2018-03-27

## 2018-03-27 RX ORDER — OXYCODONE HCL 10 MG/1
10 TABLET, FILM COATED, EXTENDED RELEASE ORAL ONCE
Status: COMPLETED | OUTPATIENT
Start: 2018-03-27 | End: 2018-03-27

## 2018-03-27 RX ADMIN — CLINDAMYCIN PHOSPHATE 900 MG: 18 INJECTION, SOLUTION INTRAVENOUS at 13:19

## 2018-03-27 RX ADMIN — PHENAZOPYRIDINE HYDROCHLORIDE 200 MG: 200 TABLET, COATED ORAL at 11:39

## 2018-03-27 RX ADMIN — SODIUM CHLORIDE, POTASSIUM CHLORIDE, SODIUM LACTATE AND CALCIUM CHLORIDE: 600; 310; 30; 20 INJECTION, SOLUTION INTRAVENOUS at 10:33

## 2018-03-27 RX ADMIN — OXYCODONE HYDROCHLORIDE 10 MG: 10 TABLET, FILM COATED, EXTENDED RELEASE ORAL at 11:39

## 2018-03-27 RX ADMIN — FENTANYL CITRATE 50 MCG: 50 INJECTION, SOLUTION INTRAMUSCULAR; INTRAVENOUS at 13:42

## 2018-03-27 RX ADMIN — GLYCOPYRROLATE 0.1 MG: 0.2 INJECTION, SOLUTION INTRAMUSCULAR; INTRAVENOUS at 13:19

## 2018-03-27 RX ADMIN — PHENYLEPHRINE HYDROCHLORIDE 100 MCG: 10 INJECTION, SOLUTION INTRAMUSCULAR; INTRAVENOUS; SUBCUTANEOUS at 13:56

## 2018-03-27 RX ADMIN — PROPOFOL 170 MG: 10 INJECTION, EMULSION INTRAVENOUS at 13:23

## 2018-03-27 RX ADMIN — HYDROMORPHONE HYDROCHLORIDE 0.5 MG: 1 INJECTION, SOLUTION INTRAMUSCULAR; INTRAVENOUS; SUBCUTANEOUS at 15:36

## 2018-03-27 RX ADMIN — KETOROLAC TROMETHAMINE 30 MG: 30 INJECTION, SOLUTION INTRAMUSCULAR at 14:26

## 2018-03-27 RX ADMIN — GENTAMICIN SULFATE 140 MG: 40 INJECTION, SOLUTION INTRAMUSCULAR; INTRAVENOUS at 13:53

## 2018-03-27 RX ADMIN — LIDOCAINE HYDROCHLORIDE 1 ML: 10 INJECTION, SOLUTION EPIDURAL; INFILTRATION; INTRACAUDAL; PERINEURAL at 10:33

## 2018-03-27 RX ADMIN — LIDOCAINE HYDROCHLORIDE 100 MG: 10 INJECTION, SOLUTION INFILTRATION; PERINEURAL at 13:23

## 2018-03-27 RX ADMIN — HYDROMORPHONE HYDROCHLORIDE: 10 INJECTION, SOLUTION INTRAMUSCULAR; INTRAVENOUS; SUBCUTANEOUS at 22:32

## 2018-03-27 RX ADMIN — GLYCOPYRROLATE 0.1 MG: 0.2 INJECTION, SOLUTION INTRAMUSCULAR; INTRAVENOUS at 13:23

## 2018-03-27 RX ADMIN — FENTANYL CITRATE 50 MCG: 50 INJECTION, SOLUTION INTRAMUSCULAR; INTRAVENOUS at 15:20

## 2018-03-27 RX ADMIN — ROCURONIUM BROMIDE 20 MG: 10 INJECTION INTRAVENOUS at 13:23

## 2018-03-27 RX ADMIN — HYDROMORPHONE HYDROCHLORIDE: 10 INJECTION, SOLUTION INTRAMUSCULAR; INTRAVENOUS; SUBCUTANEOUS at 16:33

## 2018-03-27 RX ADMIN — DOCUSATE SODIUM 100 MG: 100 CAPSULE, LIQUID FILLED ORAL at 21:01

## 2018-03-27 RX ADMIN — SCOPALAMINE 1 PATCH: 1 PATCH, EXTENDED RELEASE TRANSDERMAL at 10:35

## 2018-03-27 RX ADMIN — ONDANSETRON 8 MG: 2 INJECTION INTRAMUSCULAR; INTRAVENOUS at 13:23

## 2018-03-27 RX ADMIN — MEPERIDINE HYDROCHLORIDE 12.5 MG: 25 INJECTION, SOLUTION INTRAMUSCULAR; INTRAVENOUS; SUBCUTANEOUS at 16:24

## 2018-03-27 RX ADMIN — FENTANYL CITRATE 50 MCG: 50 INJECTION, SOLUTION INTRAMUSCULAR; INTRAVENOUS at 15:08

## 2018-03-27 RX ADMIN — MIDAZOLAM 2 MG: 1 INJECTION INTRAMUSCULAR; INTRAVENOUS at 13:19

## 2018-03-27 RX ADMIN — DEXAMETHASONE SODIUM PHOSPHATE 8 MG: 4 INJECTION, SOLUTION INTRA-ARTICULAR; INTRALESIONAL; INTRAMUSCULAR; INTRAVENOUS; SOFT TISSUE at 13:23

## 2018-03-27 RX ADMIN — SODIUM CHLORIDE, POTASSIUM CHLORIDE, SODIUM LACTATE AND CALCIUM CHLORIDE: 600; 310; 30; 20 INJECTION, SOLUTION INTRAVENOUS at 13:54

## 2018-03-27 RX ADMIN — FENTANYL CITRATE 200 MCG: 50 INJECTION, SOLUTION INTRAMUSCULAR; INTRAVENOUS at 13:23

## 2018-03-27 RX ADMIN — KETOROLAC TROMETHAMINE 30 MG: 30 INJECTION, SOLUTION INTRAMUSCULAR at 21:01

## 2018-03-27 RX ADMIN — MEPERIDINE HYDROCHLORIDE 12.5 MG: 25 INJECTION, SOLUTION INTRAMUSCULAR; INTRAVENOUS; SUBCUTANEOUS at 16:06

## 2018-03-27 RX ADMIN — SODIUM CHLORIDE, POTASSIUM CHLORIDE, SODIUM LACTATE AND CALCIUM CHLORIDE: 600; 310; 30; 20 INJECTION, SOLUTION INTRAVENOUS at 20:22

## 2018-03-27 ASSESSMENT — ACTIVITIES OF DAILY LIVING (ADL)
EATING: 0 - INDEPENDENT
FALL_HISTORY_WITHIN_LAST_SIX_MONTHS: NO
DRESS: 0-->INDEPENDENT
DRESS: 0 - INDEPENDENT
SWALLOWING: 0-->SWALLOWS FOODS/LIQUIDS WITHOUT DIFFICULTY
TRANSFERRING: 0 - INDEPENDENT
COGNITION: 0 - NO COGNITION ISSUES REPORTED
SWALLOWING: 0 - SWALLOWS FOODS/LIQUIDS WITHOUT DIFFICULTY
CHANGE_IN_FUNCTIONAL_STATUS_SINCE_ONSET_OF_CURRENT_ILLNESS/INJURY: NO
TOILETING: 0-->INDEPENDENT
TOILETING: 0 - INDEPENDENT
TRANSFERRING: 0-->INDEPENDENT
AMBULATION: 0 - INDEPENDENT
RETIRED_COMMUNICATION: 0-->UNDERSTANDS/COMMUNICATES WITHOUT DIFFICULTY
COMMUNICATION: 0 - UNDERSTANDS/COMMUNICATES WITHOUT DIFFICULTY
RETIRED_EATING: 0-->INDEPENDENT
AMBULATION: 0-->INDEPENDENT
BATHING: 0-->INDEPENDENT
BATHING: 0 - INDEPENDENT

## 2018-03-27 NOTE — ANESTHESIA PREPROCEDURE EVALUATION
Anesthesia Evaluation     . Pt has had prior anesthetic. Type: Regional, MAC and General    History of anesthetic complications   - PONV        ROS/MED HX    ENT/Pulmonary:     (+)allergic rhinitis, , . .    Neurologic:  - neg neurologic ROS     Cardiovascular: Comment: H/o PP cardiomyopathy        METS/Exercise Tolerance:  >4 METS   Hematologic:  - neg hematologic  ROS       Musculoskeletal:  - neg musculoskeletal ROS       GI/Hepatic:     (+) GERD Asymptomatic on medication, Other GI/Hepatic IBS      Renal/Genitourinary:  - ROS Renal section negative       Endo:  - neg endo ROS       Psychiatric:     (+) psychiatric history anxiety      Infectious Disease:  - neg infectious disease ROS       Malignancy:      - no malignancy   Other:    - neg other ROS                 Physical Exam  Normal systems: cardiovascular, pulmonary and dental    Airway   Mallampati: I  TM distance: >3 FB  Neck ROM: full    Dental     Cardiovascular       Pulmonary                     Anesthesia Plan      History & Physical Review  History and physical reviewed and following examination; no interval change.    ASA Status:  2 .    NPO Status:  > 8 hours    Plan for General and ETT with Intravenous and Propofol induction. Maintenance will be Inhalation and Balanced.    PONV prophylaxis:  Ondansetron (or other 5HT-3) and Dexamethasone or Solumedrol       Postoperative Care  Postoperative pain management:  IV analgesics and Oral pain medications.      Consents  Anesthetic plan, risks, benefits and alternatives discussed with:  Patient..                          .

## 2018-03-27 NOTE — H&P (VIEW-ONLY)
[facility]  [address] [address]  [phone]  Dept: [phone]    PRE-OP EVALUATION:  Today's date: 3/22/2018    Luna Rivera (: 1970) presents for pre-operative evaluation assessment as requested by Dr. Gloria Iglesias.  She requires evaluation and anesthesia risk assessment prior to undergoing surgery/procedure for treatment of enlarging fibroid uterus.      Proposed Surgery/ Procedure: COMBINED HYSTERECTOMY TOTAL ABDOMINAL, SALPINGO-OOPHORECTOMY.  Date of Surgery/ Procedure: 3/27/18  Time of Surgery/ Procedure: 11:20AM  Hospital/Surgical Facility: [place]   Fax number for surgical facility:   Primary Physician: Catrina Jin  Type of Anesthesia Anticipated: Other     Patient has a Health Care Directive or Living Will:  NO    1. NO - Do you have a history of heart attack, stroke, stent, bypass or surgery on an artery in the head, neck, heart or legs?  2. NO - Do you ever have any pain or discomfort in your chest?  3. NO - Do you have a history of  Heart Failure?  4. NO - Are you troubled by shortness of breath when: walking on the level, up a slight hill or at night?  5. NO - Do you currently have a cold, bronchitis or other respiratory infection?  6. NO - Do you have a cough, shortness of breath or wheezing?  7. NO - Do you sometimes get pains in the calves of your legs when you walk?  8. NO - Do you or anyone in your family have previous history of blood clots?  9. NO - Do you or does anyone in your family have a serious bleeding problem such as prolonged bleeding following surgeries or cuts?  10. NO - Have you ever had problems with anemia or been told to take iron pills?  11. NO - Have you had any abnormal blood loss such as black, tarry or bloody stools, or abnormal vaginal bleeding?  12. NO - Have you ever had a blood transfusion?  13. NO - Have you or any of your relatives ever had problems with anesthesia?  14. NO - Do you have sleep apnea,  excessive snoring or daytime drowsiness?  15. NO - Do you have any prosthetic heart valves?  16. NO - Do you have prosthetic joints?  17. NO - Is there any chance that you may be pregnant?      HPI:     HPI related to upcoming procedure: history of ablation for menorrhagia, but with ongoing lower abdominal pain mostly with ovulation and  Cycles.    US done 3/6/2018 showed:  PELVIC ULTRASOUND WITH ENDOVAGINAL TRANSDUCER    3/6/2018 10:24 PM      HISTORY: Masses in pelvis, try to differentiate uterine versus ovarian  in nature.     TECHNIQUE: Endovaginal sonography was added to the transabdominal exam  to better evaluate the uterus and ovaries.     COMPARISON: CT abdomen and pelvis 3/6/2018.     FINDINGS: Endometrium is 6 mm thick. Uterus measures 10 x 4.8 x 7.3  cm. Large uterine fibroids identified again. 10.6 x 9.2 x 10.7 cm  posterior lower uterine fibroid appears exophytic and is subserosal.  8.6 x 6.5 x 7.3 cm uterine fibroid at the posterior distal uterus also  appears subserosal. 4.1 x 7.2 x 3.2 cm anterior uterine body  intramural fibroid. Right and left ovaries not able to be visualized.  No free fluid.         IMPRESSION: Multiple presumed large uterine fibroids. The large sizes  of these lesions limits assessment. Right and left ovaries not able to  be identified.     JOCELYN ADAIR MD      See problem list for active medical problems.  Problems all longstanding and stable, except as noted/documented.  See ROS for pertinent symptoms related to these conditions.                                                                                                  .    MEDICAL HISTORY:     Patient Active Problem List    Diagnosis Date Noted     Intramural and subserous leiomyoma of uterus 03/19/2018     Priority: Medium     Situational anxiety 08/17/2017     Priority: Medium     Grief reaction 08/17/2017     Priority: Medium     Ptosis of eyelid, bilateral 11/23/2016     Priority: Medium     Gastroesophageal reflux  disease without esophagitis 10/24/2016     Priority: Medium     Hip impingement syndrome, unspecified laterality 11/10/2015     Priority: Medium     External hemorrhoids 05/06/2015     Priority: Medium     Acne 10/05/2011     Priority: Medium     CARDIOVASCULAR SCREENING; LDL GOAL LESS THAN 160 10/31/2010     Priority: Medium     Family history of breast cancer in mother 04/01/2009     Priority: Medium     Hemorrhoids 09/20/2007     Priority: Medium     Problem list name updated by automated process. Provider to review       Herpes simplex virus (HSV) infection      Priority: Medium     Last outbreak 1/05  Problem list name updated by automated process. Provider to review       Allergic rhinitis 08/31/2005     Priority: Medium     Has had really bad allergies and cannot sleep at night because tichty and scratchy throat and ears. suring the morning she has blocked congested nose. There is no wheezing or cough. There is sneezing. Usually her symptoms are controlled by calritin but since last two weeks the above aymptoms have been there and are severe.    The symptoms are seasonal and worse at home as comparee d to when she is in the city.    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     Contact dermatitis and other eczema, due to unspecified cause      Enteritis due to [place] virus 12/2002     Herpes simplex without mention of complication     Last outbreak 1/05     Irritable bowel syndrome      Other abnormal Papanicolaou smear of cervix and cervical HPV(795.09) 1990    Laser Tx      PONV (postoperative nausea and vomiting)      Past Surgical History:   Procedure Laterality Date     ARTHROSCOPY KNEE WITH MEDIAL MENISCECTOMY  6/20/2014    Procedure: ARTHROSCOPY KNEE WITH MEDIAL MENISCECTOMY;  Surgeon: Alejandro Dodge MD;  Location: [facility] OR  "    C  DELIVERY ONLY  ,      C LIGATE FALLOPIAN TUBE,POSTPARTUM       ESOPHAGOSCOPY, GASTROSCOPY, DUODENOSCOPY (EGD), COMBINED N/A 2016    Procedure: COMBINED ESOPHAGOSCOPY, GASTROSCOPY, DUODENOSCOPY (EGD), BIOPSY SINGLE OR MULTIPLE;  Surgeon: Jose Mora MD;  Location: [facility] HYSTEROSCOPY, SURGICAL; W/ ENDOMETRIAL ABLATION, ANY METHOD  3/30/10    Novasure ablation     Current Outpatient Prescriptions   Medication Sig Dispense Refill     hydrocortisone (ANUSOL-HC) 2.5 % cream Place rectally 2 times daily 28.35 g 1     acyclovir (ZOVIRAX) 400 MG tablet Take 1 tablet (400 mg) by mouth 2 times daily 180 tablet 3     tretinoin (RETIN-A) 0.025 % topical gel Apply  topically At Bedtime. (Patient taking differently: Apply topically At Bedtime Apply  topically At Bedtime.) 45 g 12     Omeprazole (PRILOSEC PO) Take 20 mg by mouth daily as needed        fluticasone (FLONASE) 50 MCG/ACT spray Spray 2 sprays into both nostrils daily 3 Bottle 1     [DISCONTINUED] acyclovir (ZOVIRAX) 400 MG tablet TAKE ONE TABLET BY MOUTH TWICE DAILY 180 tablet 0     OTC products: None, except as noted above    Allergies   Allergen Reactions     Compazine Fatigue     Neurological s/s-can't wake up     Ancef [Cefazolin Sodium] Hives      Latex Allergy: NO    Social History   Substance Use Topics     Smoking status: Never Smoker     Smokeless tobacco: Never Used     Alcohol use Yes      Comment: rarely     History   Drug Use No       REVIEW OF SYSTEMS:   Constitutional, neuro, ENT, endocrine, pulmonary, cardiac, gastrointestinal, genitourinary, musculoskeletal, integument and psychiatric systems are negative, except as otherwise noted.    EXAM:   /83 (BP Location: Left arm)  Pulse 91  Temp 99.3  F (37.4  C) (Tympanic)  Ht 5' 8\" (1.727 m)  Wt 146 lb (66.2 kg)  LMP 2018  BMI 22.2 kg/m2    GENERAL APPEARANCE: healthy, alert and no distress     HENT: ear canals and TM's normal and nose and mouth " without ulcers or lesions     NECK: no adenopathy, no asymmetry, masses, or scars and thyroid normal to palpation     RESP: lungs clear to auscultation - no rales, rhonchi or wheezes     CV: regular rates and rhythm, normal S1 S2, no S3 or S4 and no murmur, click or rub     ABDOMEN:  soft, nontender, no HSM or masses and bowel sounds normal     MS: extremities normal- no gross deformities noted, no evidence of inflammation in joints, FROM in all extremities.     SKIN: no suspicious lesions or rashes  After a complete discussion of the multiple treatment options for warts including the risks and bennefits of each, the patient has decided on treatment with Liquid nitrogen.  Each wart was frozen easily three times with liquid nitrogen. .  A total of 1 warts are treated today.       NEURO: Normal strength and tone, sensory exam grossly normal, mentation intact and speech normal     PSYCH: mentation appears normal. and affect normal/bright    DIAGNOSTICS:     EKG: Not indicated due to non-vascular surgery and low risk of event (age <65 and without cardiac risk factors)  Labs Drawn and in Process:   Unresulted Labs Ordered in the Past 30 Days of this Admission     No orders found from 1/21/2018 to 3/23/2018.          Recent Labs   Lab Test  03/06/18   1910  10/24/16   1214  05/06/15   0858   HGB  15.8*   --   14.0   PLT  300   --   234   NA  138  139  139   POTASSIUM  3.4  4.1  3.9   CR  0.84  0.84  0.84        IMPRESSION:   Reason for surgery/procedure:  treatment of enlarging fibroid uterus.      Proposed Surgery/ Procedure: COMBINED HYSTERECTOMY TOTAL ABDOMINAL, SALPINGO-OOPHORECTOMY.  Diagnosis/reason for consult: pre operative consult    The proposed surgical procedure is considered INTERMEDIATE risk.    REVISED CARDIAC RISK INDEX  The patient has the following serious cardiovascular risks for perioperative complications such as (MI, PE, VFib and 3  AV Block):  No serious cardiac risks  INTERPRETATION: 0 risks: Class  I (very low risk - 0.4% complication rate)    The patient has the following additional risks for perioperative complications:  No identified additional risks  The ASCVD Risk score (Aleksandar NEHAL Jr, et al., 2013) failed to calculate for the following reasons:    Cannot find a previous HDL lab    Cannot find a previous total cholesterol lab      ICD-10-CM    1. Preop general physical exam Z01.818    2. Uterine leiomyoma, unspecified location D25.9    3. External hemorrhoids K64.4 hydrocortisone (ANUSOL-HC) 2.5 % cream   4. Herpes simplex virus (HSV) infection B00.9 acyclovir (ZOVIRAX) 400 MG tablet   5. Plantar wart of left foot B07.0 DESTRUCT BENIGN LESION, UP TO 14       RECOMMENDATIONS:     --Consult hospital rounder / IM to assist post-op medical management    --Patient is to take all scheduled medications on the day of surgery EXCEPT for modifications listed below.    APPROVAL GIVEN to proceed with proposed procedure, without further diagnostic evaluation       Signed Electronically by: Catrina Jin NP    Copy of this evaluation report is provided to requesting physician.    [facility] Preop Guidelines

## 2018-03-27 NOTE — BRIEF OP NOTE
[facility] Gynecology Brief Operative Note    Pre-operative diagnosis: intramural and subserous leiomyoma of uterus   Post-operative diagnosis: Same   Procedure: Abdominal hysterectomy   Surgeon: Gloria Iglesias MD   Assistant(s): Madelaine   Anesthesia: General Endotracheal Anesthesia   Estimated blood loss: 150ml   Total IV fluids: (See anesthesia record)   Blood transfusion: No transfusion was given during surgery   Total urine output: (See anesthesia record)   Drains: Cortez catheter   Specimens: Uterus, cervix and fibroids   Findings:    Complications: None   Condition: Stable   Comments: See dictated operative report for full details

## 2018-03-27 NOTE — OP NOTE
Procedure Date: 03/27/2018      PREOPERATIVE DIAGNOSIS:  Multiple uterine fibroids.      POSTOPERATIVE DIAGNOSIS:  Multiple uterine fibroids.      PROCEDURE:  Total abdominal hysterectomy.      SURGEON:  Gloria Iglesias MD      ASSISTANT:  Suly Nicole MD.      A surgical assistant was vital for complicated procedure involving a markedly enlarged uterus with multiple uterine fibroids, for entry, retraction, hemostasis and closure.      ANESTHESIA:  General.      FINDINGS:  Uterus was markedly enlarged with an approximately 10 cm broad ligament fibroid on the right side, a cervical fibroid centrally and other fibroids present as well.  The ovaries appeared normal.      DESCRIPTION OF PROCEDURE:  After assuring informed consent, the patient was taken to the operative suite where a general anesthetic was induced.  The patient's abdomen and vagina were sterilely prepped and draped, and a Cortez catheter was placed for bladder drainage. A time out and  safety assessment was performed.        A Pfannenstiel-type incision was then made with a knife and extended down to the fascia.  The fascia was incised sharply  right and left and freed bluntly and sharply from the underlying rectus muscles, which were bluntly .        The peritoneum was entered and extended superiorly and inferiorly.  An Dami elevator retractor was placed, and the bowel was packed into the upper abdomen with moist laps.  The uterus could be externalized.  Because the size of fibroids obscuring the ability to move around the uterus, I elected to proceed with myomectomy.        The broad ligament fibroid was incised through the peritoneum to the capsule, and then bluntly dissected out of the broad ligament with the main pedicle vasculature secured with a LigaSure.  The same LigaSure was then utilized to cauterize and transect the utero-ovarian ligaments, the remainder of the broad ligaments, the round ligaments to the cardinal  ligaments.  A bladder flap was created sharply, and dissected down over a fibroid in the anterior cervix which was approximately 5 cm in size.        The capsule was similarly incised over this fibroid, and it was removed.  Further transection after cauterization was performed of the cardinal and uterosacral ligaments to the lower cervical margin could be identified which was cross-clamped with 2 [place] clamps and incised removing the remainder of the cervical stump from the field.  The vaginal cuff was then closed with multiple interrupted sutures, and the pelvis was thoroughly irrigated and inspected for hemostasis.        The sidewalls were examined and the uterus could be seen particularly on the right side.        Gloves, instruments and gowns were then changed for closure.  The self-retaining retractor was removed.  The subfascial region was inspected and appeared hemostatic.  The fascia was then closed in running fashion with Vicryl suture.  The skin edges were irrigated.  Skin freed with some cautery and then closed in a subcuticular fashion with V-Loc suture and Dermabond.  The urine was clear at this time.  The patient was then awakened and taken to postanesthesia recovery in stable condition.      ESTIMATED BLOOD LOSS:  150 mL.      SPECIMENS TO LAB:  Uterus, cervix and multiple uterine fibroids.         DOMENIC REN MD             D: 2018   T: 2018   MT: DARIEN      Name:     KISHOR ALCANTAR   MRN:      [id number]        Account:        [id number]   :      1970           Procedure Date: 2018      Document: [id number]

## 2018-03-27 NOTE — INTERVAL H&P NOTE
H & P without interval change; informed consent reviewed and signed; pt desires to maintain ovaries but OK with removal of fallopian tubes along with uterus, as long as ovaries are deemed nornmal  Gloria Iglesias MD  [facility]

## 2018-03-27 NOTE — IP AVS SNAPSHOT
"                  MRN:[id number]                      After Visit Summary   3/27/2018    Luna Rivera    MRN: [id number]           Thank you!     Thank you for choosing [facility] for your care. Our goal is always to provide you with excellent care. Hearing back from our patients is one way we can continue to improve our services. Please take a few minutes to complete the written survey that you may receive in the mail after you visit with us. Thank you!        Patient Information     Date Of Birth          1970        About your hospital stay     You were admitted on:  March 27, 2018 You last received care in the:  [facility]    You were discharged on:  March 30, 2018       Who to Call     For medical emergencies, please call 911.  For non-urgent questions about your medical care, please call your primary care provider or clinic, [phone]  For questions related to your surgery, please call your surgery clinic        Attending Provider     Provider Specialty    Gloria Iglesias MD OB/Gyn       Primary Care Provider Office Phone # Fax #    Catrina Ioana Jin [phone]      Pending Results     Date and Time Order Name Status Description    3/27/2018 1432 Surgical pathology exam In process             Statement of Approval     Ordered          03/30/18 0829  I have reviewed and agree with all the recommendations and orders detailed in this document.  EFFECTIVE NOW     Approved and electronically signed by:  Gloria Iglesias MD             Admission Information     Date & Time Provider Department Dept. Phone    3/27/2018 Gloria Iglesias MD [facility] [phone]      Your Vitals Were     Blood Pressure Pulse Temperature Respirations Height Weight    101/59 74 97.7  F (36.5  C) (Oral) 18 1.727 m (5' 8\") 66.2 kg (146 lb)    Pulse Oximetry BMI (Body Mass Index)                98% 22.2 kg/m2          MyChart Information     MyChart " gives you secure access to your electronic health record. If you see a primary care provider, you can also send messages to your care team and make appointments. If you have questions, please call your primary care clinic.  If you do not have a primary care provider, please call [phone] and they will assist you.        Care EveryWhere ID     This is your Care EveryWhere ID. This could be used by other organizations to access your [facility] medical records  [id number]        Equal Access to Services     ANA PRIETO : Hadii lakshmi ness hadasho Soomaali, waaxda luqadaha, qaybta kaalmada adeegyada, thierry avitia . So [facility] [phone].    ATENCIÓN: Si habla español, tiene a lema disposición servicios gratuitos de asistencia lingüística. [facility] [phone].    We comply with applicable federal civil rights laws and [place] laws. We do not discriminate on the basis of race, color, national origin, age, disability, sex, sexual orientation, or gender identity.               Review of your medicines      START taking        Dose / Directions    ibuprofen 400 MG tablet   Commonly known as:  ADVIL/MOTRIN   Used for:  Postoperative state        Dose:  400 mg   Take 1 tablet (400 mg) by mouth every 6 hours as needed for moderate pain   Quantity:  40 tablet   Refills:  1       oxyCODONE IR 5 MG tablet   Commonly known as:  ROXICODONE   Used for:  Postoperative state        Dose:  5-10 mg   Take 1-2 tablets (5-10 mg) by mouth every 4 hours as needed for moderate to severe pain   Quantity:  40 tablet   Refills:  0       senna-docusate 8.6-50 MG per tablet   Commonly known as:  SENOKOT-S;PERICOLACE   Used for:  Postoperative state        Dose:  1 tablet   Take 1 tablet by mouth 2 times daily   Quantity:  60 tablet   Refills:  0         CONTINUE these medicines which may have CHANGED, or have new prescriptions. If we are uncertain of the size of tablets/capsules you have at home, strength may be listed  as something that might have changed.        Dose / Directions    tretinoin 0.025 % topical gel   Commonly known as:  RETIN-A   This may have changed:    - how to take this  - when to take this  - additional instructions   Used for:  Acne, unspecified acne type        Apply  topically At Bedtime.   Quantity:  45 g   Refills:  12         CONTINUE these medicines which have NOT CHANGED        Dose / Directions    acyclovir 400 MG tablet   Commonly known as:  ZOVIRAX   Used for:  Herpes simplex virus (HSV) infection        Dose:  400 mg   Take 1 tablet (400 mg) by mouth 2 times daily   Quantity:  180 tablet   Refills:  3       fluticasone 50 MCG/ACT spray   Commonly known as:  FLONASE   Used for:  Allergic rhinitis        Dose:  2 spray   Spray 2 sprays into both nostrils daily   Quantity:  3 Bottle   Refills:  1       hydrocortisone 2.5 % cream   Commonly known as:  ANUSOL-HC   Used for:  External hemorrhoids        Place rectally 2 times daily   Quantity:  28.35 g   Refills:  1       PRILOSEC PO        Dose:  20 mg   Take 20 mg by mouth daily as needed   Refills:  0         STOP taking     oxyCODONE-acetaminophen 5-325 MG per tablet   Commonly known as:  PERCOCET                Where to get your medicines      These medications were sent to [facility] [place], [place] - [address]  [address] [address]     Phone:  [phone]     ibuprofen 400 MG tablet    senna-docusate 8.6-50 MG per tablet         Some of these will need a paper prescription and others can be bought over the counter. Ask your nurse if you have questions.     Bring a paper prescription for each of these medications     oxyCODONE IR 5 MG tablet                Protect others around you: Learn how to safely use, store and throw away your medicines at www.disposemymeds.org.        Information about OPIOIDS     PRESCRIPTION OPIOIDS: WHAT YOU NEED TO KNOW    Prescription opioids can be used to help relieve moderate to  severe pain and are often prescribed following a surgery or injury, or for certain health conditions. These medications can be an important part of treatment but also come with serious risks. It is important to work with your health care provider to make sure you are getting the safest, most effective care.    WHAT ARE THE RISKS AND SIDE EFFECTS OF OPIOID USE?  Prescription opioids carry serious risks of addiction and overdose, especially with prolonged use. An opioid overdose, often marked by slowed breathing can cause sudden death. The use of prescription opioids can have a number of side effects as well, even when taken as directed:      Tolerance - meaning you might need to take more of a medication for the same pain relief    Physical dependence - meaning you have symptoms of withdrawal when a medication is stopped    Increased sensitivity to pain    Constipation    Nausea, vomiting, and dry mouth    Sleepiness and dizziness    Confusion    Depression    Low levels of testosterone that can result in lower sex drive, energy, and strength    Itching and sweating    RISKS ARE GREATER WITH:    History of drug misuse, substance use disorder, or overdose    Mental health conditions (such as depression or anxiety)    Sleep apnea    Older age (65 years or older)    Pregnancy    Avoid alcohol while taking prescription opioids.   Also, unless specifically advised by your health care provider, medications to avoid include:    Benzodiazepines (such as Xanax or Valium)    Muscle relaxants (such as Soma or Flexeril)    Hypnotics (such as Ambien or Lunesta)    Other prescription opioids    KNOW YOUR OPTIONS:  Talk to your health care provider about ways to manage your pain that do not involve prescription opioids. Some of these options may actually work better and have fewer risks and side effects:    Pain relievers such as acetaminophen, ibuprofen, and naproxen    Some medications that are also used for depression or  seizures    Physical therapy and exercise    Cognitive behavioral therapy, a psychological, goal-directed approach, in which patients learn how to modify physical, behavioral, and emotional triggers of pain and stress    IF YOU ARE PRESCRIBED OPIOIDS FOR PAIN:    Never take opioids in greater amounts or more often than prescribed    Follow up with your primary health care provider and work together to create a plan on how to manage your pain.    Talk about ways to help manage your pain that do not involve prescription opioids    Talk about all concerns and side effects    Help prevent misuse and abuse    Never sell or share prescription opioids    Never use another person's prescription opioids    Store prescription opioids in a secure place and out of reach of others (this may include visitors, children, friends, and family)    Visit www.cdc.gov/drugoverdose to learn about risks of opioid abuse and overdose    If you believe you may be struggling with addiction, tell your health care provider and ask for guidance or call [facility]'s National Helpline at 9-674-502-HELP    LEARN MORE / www.cdc.gov/drugoverdose/prescribing/guideline.html    Safely dispose of unused prescription opioids: Find your local drug take-back programs and more information about the importance of safe disposal at www.doseofreality.[place].gov             Medication List: This is a list of all your medications and when to take them. Check marks below indicate your daily home schedule. Keep this list as a reference.      Medications           Morning Afternoon Evening Bedtime As Needed    acyclovir 400 MG tablet   Commonly known as:  ZOVIRAX   Take 1 tablet (400 mg) by mouth 2 times daily                                fluticasone 50 MCG/ACT spray   Commonly known as:  FLONASE   Spray 2 sprays into both nostrils daily                                hydrocortisone 2.5 % cream   Commonly known as:  ANUSOL-HC   Place rectally 2 times daily                                 ibuprofen 400 MG tablet   Commonly known as:  ADVIL/MOTRIN   Take 1 tablet (400 mg) by mouth every 6 hours as needed for moderate pain   Last time this was given:  400 mg on 3/30/2018  7:11 AM                                oxyCODONE IR 5 MG tablet   Commonly known as:  ROXICODONE   Take 1-2 tablets (5-10 mg) by mouth every 4 hours as needed for moderate to severe pain   Last time this was given:  5 mg on 3/30/2018  7:11 AM                                PRILOSEC PO   Take 20 mg by mouth daily as needed                                senna-docusate 8.6-50 MG per tablet   Commonly known as:  SENOKOT-S;PERICOLACE   Take 1 tablet by mouth 2 times daily                                tretinoin 0.025 % topical gel   Commonly known as:  RETIN-A   Apply  topically At Bedtime.                                          More Information        Discharge Instructions for Abdominal Hysterectomy  You had a procedure called abdominal hysterectomy, a surgery to remove your uterus. This can relieve such problems as severe pain and bleeding. It usually takes from 4 to 6 weeks to recover from abdominal hysterectomy. Remember, though, that recovery time varies from woman to woman.     When to call your doctor  Call your doctor right away if you have any of the following:    Fever above 100.4 F (38 C)     Chills    Bright red vaginal bleeding or vaginal bleeding thatsoaks more than 1 pad per hour    A smelly discharge from the vagina    Trouble urinating or burning when you urinate    Severe pain or bloating in your abdomen    Redness, swelling, or drainage at your incision site    Shortness of breath or chest pain    Nausea and vomiting      Home care  These are suggestions for what to do once you are home:    Don t drive until your doctor says it's OK. Don t drive while you are still taking opioid pain medications.    Ask others to help with chores and errands while you recover.    Don t lift anything heavier than 10  pounds for 6 weeks.    Don t vacuum or do other strenuous activities until the doctor says it s OK.    Walk as often as you feel able.    When you must climb stairs, go slowly and pause after every few steps.    Continue the coughing and deep breathing exercises that you learned in the hospital.    Avoid constipation:    Eat fruits, vegetables, and whole grains.    Drink 6 to 8 glasses of water a day, unless directed otherwise.    Use a laxative or a mild stool softener if your doctor says it s OK.    Shower as usual. Wash your incision with mild soap and water. Do not scrub the incision to clean it. Pat it dry.    Don t use oils, powders, or lotions on your incision.    Don t put anything in your vagina until your doctor says it s safe to do so. Don t use tampons or douches. Don t have sex. Do not do any of these things for 6 weeks.    If you had both ovaries removed, report hot flashes, mood swings, and irritability to your doctor. There may be medications that can help you.  Follow-up    Ask your doctor when you can return to work.  Date Last Reviewed: 5/19/2015 2000-2017 [employer]. [address] [place] [address], [place] [address]. All rights reserved. This information is not intended as a substitute for professional medical care. Always follow your healthcare professional's instructions.

## 2018-03-27 NOTE — ANESTHESIA POSTPROCEDURE EVALUATION
Patient: Luna Rivera    Procedure(s):  Total abdominal Hysterectomy, Sparing Ovaries and Fallopian Tubes with Removal of Uterine Fibroids - Wound Class: II-Clean Contaminated    Diagnosis:intramural and subserous leiomyoma of uterus  Diagnosis Additional Information: No value filed.    Anesthesia Type:  No value filed.    Note:  Anesthesia Post Evaluation    Patient location during evaluation: Bedside  Patient participation: Able to fully participate in evaluation  Level of consciousness: awake and alert  Pain management: adequate  Airway patency: patent  Cardiovascular status: acceptable  Respiratory status: acceptable  Hydration status: acceptable  PONV: none     Anesthetic complications: None          Last vitals:  Vitals:    03/27/18 1705 03/27/18 1735 03/27/18 1805   BP: 98/62 104/57 104/61   Resp: 16 16 16   Temp: 36.7  C (98  F)     SpO2: 98% 98% 100%         Electronically Signed By: MARLON Anglin CRNA  March 27, 2018  6:26 PM

## 2018-03-27 NOTE — ANESTHESIA CARE TRANSFER NOTE
Patient: Luna Rivera    Procedure(s):  Total abdominal hysterectomy with possible bilateral salpingoophorectomy-choice anes - Wound Class: II-Clean Contaminated    Diagnosis: intramural and subserous leiomyoma of uterus  Diagnosis Additional Information: No value filed.    Anesthesia Type:   No value filed.     Note:  Airway :Nasal Cannula  Patient transferred to:PACU  Handoff Report: Identifed the Patient, Identified the Reponsible Provider, Reviewed the pertinent medical history, Discussed the surgical course, Reviewed Intra-OP anesthesia mangement and issues during anesthesia, Set expectations for post-procedure period and Allowed opportunity for questions and acknowledgement of understanding      Vitals: (Last set prior to Anesthesia Care Transfer)    CRNA VITALS  3/27/2018 1413 - 3/27/2018 1443      3/27/2018             Pulse: 113    SpO2: 98 %    Resp Rate (observed): (!)  5                Electronically Signed By: MARLON Gonzalez CRNA  March 27, 2018  2:43 PM

## 2018-03-27 NOTE — PROGRESS NOTES
Pt arrived to ob unit from PACU stable.  Vss, afebrile.  O2 sats 98% RA.  Evaristo ice chips.  Abd inc CDI.  No vaginal bleeding.  C/O discomfort.  Enc pt to use PCA button prn.  Pt stable.  Will follow post hyst orders per protocol.

## 2018-03-27 NOTE — IP AVS SNAPSHOT
[facility]    [address] [address]    Phone:  [phone]    Fax:  [phone]                                       After Visit Summary   3/27/2018    Luna Rivera    MRN: [id number]           After Visit Summary Signature Page     I have received my discharge instructions, and my questions have been answered. I have discussed any challenges I see with this plan with the nurse or doctor.    ..........................................................................................................................................  Patient/Patient Representative Signature      ..........................................................................................................................................  Patient Representative Print Name and Relationship to Patient    ..................................................               ................................................  Date                                            Time    ..........................................................................................................................................  Reviewed by Signature/Title    ...................................................              ..............................................  Date                                                            Time

## 2018-03-28 LAB
ANION GAP SERPL CALCULATED.3IONS-SCNC: 5 MMOL/L (ref 3–14)
BUN SERPL-MCNC: 8 MG/DL (ref 7–30)
CALCIUM SERPL-MCNC: 7.3 MG/DL (ref 8.5–10.1)
CHLORIDE SERPL-SCNC: 103 MMOL/L (ref 94–109)
CO2 SERPL-SCNC: 29 MMOL/L (ref 20–32)
CREAT SERPL-MCNC: 0.79 MG/DL (ref 0.52–1.04)
ERYTHROCYTE [DISTWIDTH] IN BLOOD BY AUTOMATED COUNT: 12.9 % (ref 10–15)
GFR SERPL CREATININE-BSD FRML MDRD: 78 ML/MIN/1.7M2
GLUCOSE SERPL-MCNC: 134 MG/DL (ref 70–99)
HCT VFR BLD AUTO: 32 % (ref 35–47)
HGB BLD-MCNC: 10.8 G/DL (ref 11.7–15.7)
MCH RBC QN AUTO: 30.6 PG (ref 26.5–33)
MCHC RBC AUTO-ENTMCNC: 33.8 G/DL (ref 31.5–36.5)
MCV RBC AUTO: 91 FL (ref 78–100)
PLATELET # BLD AUTO: 198 10E9/L (ref 150–450)
POTASSIUM SERPL-SCNC: 3.7 MMOL/L (ref 3.4–5.3)
RBC # BLD AUTO: 3.53 10E12/L (ref 3.8–5.2)
SODIUM SERPL-SCNC: 137 MMOL/L (ref 133–144)
WBC # BLD AUTO: 8.7 10E9/L (ref 4–11)

## 2018-03-28 PROCEDURE — 85027 COMPLETE CBC AUTOMATED: CPT | Performed by: OBSTETRICS & GYNECOLOGY

## 2018-03-28 PROCEDURE — 36415 COLL VENOUS BLD VENIPUNCTURE: CPT | Performed by: OBSTETRICS & GYNECOLOGY

## 2018-03-28 PROCEDURE — 12000027 ZZH R&B OB

## 2018-03-28 PROCEDURE — 25000132 ZZH RX MED GY IP 250 OP 250 PS 637: Performed by: OBSTETRICS & GYNECOLOGY

## 2018-03-28 PROCEDURE — 80048 BASIC METABOLIC PNL TOTAL CA: CPT | Performed by: OBSTETRICS & GYNECOLOGY

## 2018-03-28 PROCEDURE — 25000128 H RX IP 250 OP 636: Performed by: OBSTETRICS & GYNECOLOGY

## 2018-03-28 RX ORDER — HYDROXYZINE HYDROCHLORIDE 25 MG/1
25-50 TABLET, FILM COATED ORAL EVERY 6 HOURS PRN
Status: DISCONTINUED | OUTPATIENT
Start: 2018-03-28 | End: 2018-03-30 | Stop reason: HOSPADM

## 2018-03-28 RX ADMIN — HYDROXYZINE HYDROCHLORIDE 25 MG: 25 TABLET ORAL at 09:42

## 2018-03-28 RX ADMIN — KETOROLAC TROMETHAMINE 30 MG: 30 INJECTION, SOLUTION INTRAMUSCULAR at 23:36

## 2018-03-28 RX ADMIN — KETOROLAC TROMETHAMINE 30 MG: 30 INJECTION, SOLUTION INTRAMUSCULAR at 02:44

## 2018-03-28 RX ADMIN — KETOROLAC TROMETHAMINE 30 MG: 30 INJECTION, SOLUTION INTRAMUSCULAR at 11:12

## 2018-03-28 RX ADMIN — SIMETHICONE CHEW TAB 80 MG 80 MG: 80 TABLET ORAL at 08:45

## 2018-03-28 RX ADMIN — SIMETHICONE CHEW TAB 80 MG 80 MG: 80 TABLET ORAL at 19:40

## 2018-03-28 RX ADMIN — SODIUM CHLORIDE, POTASSIUM CHLORIDE, SODIUM LACTATE AND CALCIUM CHLORIDE: 600; 310; 30; 20 INJECTION, SOLUTION INTRAVENOUS at 03:59

## 2018-03-28 RX ADMIN — OXYCODONE HYDROCHLORIDE 5 MG: 5 TABLET ORAL at 21:45

## 2018-03-28 RX ADMIN — OXYCODONE HYDROCHLORIDE 10 MG: 5 TABLET ORAL at 09:42

## 2018-03-28 RX ADMIN — OXYCODONE HYDROCHLORIDE 10 MG: 5 TABLET ORAL at 17:32

## 2018-03-28 RX ADMIN — OXYCODONE HYDROCHLORIDE 5 MG: 5 TABLET ORAL at 00:54

## 2018-03-28 RX ADMIN — DOCUSATE SODIUM 100 MG: 100 CAPSULE, LIQUID FILLED ORAL at 19:40

## 2018-03-28 RX ADMIN — HYDROXYZINE HYDROCHLORIDE 25 MG: 25 TABLET ORAL at 00:54

## 2018-03-28 RX ADMIN — HYDROXYZINE HYDROCHLORIDE 25 MG: 25 TABLET ORAL at 20:48

## 2018-03-28 RX ADMIN — HYDROXYZINE HYDROCHLORIDE 25 MG: 25 TABLET ORAL at 16:35

## 2018-03-28 RX ADMIN — OXYCODONE HYDROCHLORIDE 10 MG: 5 TABLET ORAL at 13:46

## 2018-03-28 RX ADMIN — DOCUSATE SODIUM 100 MG: 100 CAPSULE, LIQUID FILLED ORAL at 08:45

## 2018-03-28 RX ADMIN — HYDROMORPHONE HYDROCHLORIDE: 10 INJECTION, SOLUTION INTRAMUSCULAR; INTRAVENOUS; SUBCUTANEOUS at 06:02

## 2018-03-28 RX ADMIN — KETOROLAC TROMETHAMINE 30 MG: 30 INJECTION, SOLUTION INTRAMUSCULAR at 17:33

## 2018-03-28 NOTE — PLAN OF CARE
Problem: Patient Care Overview  Goal: Plan of Care/Patient Progress Review  Outcome: Improving  VSS, pt is tolerating a regular diet, transitioning fair from a Dilaudid PCA to oral pain medications and IV Toradol. Cortez catheter was removed at 10:45 with 700cc output. Pt instructed on calling for assistance when up to void and to void in the container for measurements. Pt reports adequate pain relief . Report to CHRISTIAN Godoy RN.

## 2018-03-28 NOTE — PLAN OF CARE
Problem: Hysterectomy (Adult)  Goal: Signs and Symptoms of Listed Potential Problems Will be Absent, Minimized or Managed (Hysterectomy)  Signs and symptoms of listed potential problems will be absent, minimized or managed by discharge/transition of care (reference Hysterectomy (Adult) CPG).   Outcome: Improving   Allergies:  Compazine and Ancef [cefazolin sodium]     Vital Signs  Temp: 98.6  F (37  C)  Temp src: Oral  Resp: 16  Heart Rate: 68  BP: 96/61  SpO2: 97 %  Last vitals done @ 0300  Surgical incisions; Lower abdominal  Lines, drains and devices include; Pulse Oximeter, PCA, Cortez, Capnography and SCD's  Vag packing Is not in place  EBL: 150 mL  Administration (Incentive Spirometer): done with encouragement     Pain goal achieved with Dilaudid via PCA and hydrocodone po X1 for break through pain..  Last medicated at 0100 with hydrocodone. Discussed pain management for today including po meds and stopping PCA  Antiemetic given; None since PACU  Lab Results   Component Value Date    ABO A 03/27/2018    RH Pos 03/27/2018    GLC 85 03/06/2018    HGB 14.5 03/27/2018    HGB 15.8 (H) 03/06/2018    WBC 3.6 (L) 03/27/2018     Taking fluids and soft foods without difficulty. Yvonne patent of clear keisha/orange urine. Reviewed plan to d/c yvonne later this morning. Stood at the side of the bed and marched in place briefly. Patient said that she felt a little light headed but returned to bed with assist without difficulty. Very pleasant and engaging. Incision clean dry and intact.

## 2018-03-28 NOTE — PROGRESS NOTES
[facility] Gynecology Post-Op / Progress Note         Assessment and Plan:    Assessment:   Post-operative day #1  Abdominal hysterectomy     Doing well.  Clean wound without signs of infection.  Normal healing wound.  No immediate surgical complications identified.  No excessive bleeding  Pain well-controlled.      Plan:   Ambulate; wean from PCA and IV           Interval History:   Doing well.  Continues to improve.  Pain is well-controlled.  No fevers.            Significant Problems:    Principal Problem:    Intramural and subserous leiomyoma of uterus  Active Problems:    S/P TRACI (total abdominal hysterectomy)            Review of Systems:    The patient denies any chest pain, shortness of breath, excessive pain, fever, chills, purulent drainage from the wound, nausea or vomiting.          Medications:   All medications related to the patient's surgery have been reviewed          Physical Exam:   All vitals stable  Temp: 98.6  F (37  C) Temp src: Oral BP: 96/61   Heart Rate: 68 Resp: 16 SpO2: 97 % O2 Device: None (Room air) Oxygen Delivery: 2 LPM  Wound clean and dry with minimal or no drainage.  Surrounding skin with minimal erythema.          Data:     All laboratory data related to this surgery reviewed  Hemoglobin   Date Value Ref Range Status   03/28/2018 10.8 (L) 11.7 - 15.7 g/dL Final   03/27/2018 14.5 11.7 - 15.7 g/dL Final   03/06/2018 15.8 (H) 11.7 - 15.7 g/dL Final   05/06/2015 14.0 11.7 - 15.7 g/dL Final   06/17/2014 14.6 11.7 - 15.7 g/dL Final     No imaging studies have been ordered    Gloria Iglesias MD

## 2018-03-28 NOTE — PROGRESS NOTES
"Patient still reporting dizziness upon standing/ambulating. PRN oxycodone and Toradol administered for acute pain during dinner. Incision is c/d/i with no drainage. Denying any nausea. VS stable on room air. Saline locked. /56 (BP Location: Right arm)  Pulse 71  Temp 99.3  F (37.4  C) (Oral)  Resp 16  Ht 1.727 m (5' 8\")  Wt 66.2 kg (146 lb)  SpO2 97%  BMI 22.2 kg/m2    "

## 2018-03-28 NOTE — PLAN OF CARE
Problem: Patient Care Overview  Goal: Plan of Care/Patient Progress Review  Outcome: Improving  Patient able to ambulate to bathroom to void, passed one small clot. Slightly dizzy with ambulation/standing. Assist of 1 for transfers currently. PCD's on. PCA discontinued, patient saline locked currently. PRN oxycodone for pain management last administered at 1345. Using call light appropriately.

## 2018-03-28 NOTE — PROGRESS NOTES
"SPIRITUAL HEALTH SERVICES  SPIRITUAL [place]ESSMENT [place]  [facility] [place]    [place] FOC[place]:     Assessment of emotional/spiritual/[employer] distress    Support for coping    ILLNESS CIRCUMSTANCES:   Reviewed documentation. Reflective conversation shared with Luna Rivera which integrated elements of illness and family narratives.     Context of Serious Illness/Symptom(s) - [facility]    Resources for Support - , Yovani present for a short time during the visit; Luna has family also    DISTRESS:     Emotional/Existential/Relational Distress - Luna stated the primary reason she asked for a  visit was because of her concern around the 'fibroid tumors' that were removed during her hysterectomy.  She stated her father  of cancer 9 years ago.  Her mother has been dealing with cancer off and on for the past several years.    Spiritual/Cheondoism Distress - None identified    Social/Cultural/Economic Distress - None identified    SPIRITUAL/Druze COPING:     Sikhism/[employer] - Previously attended [employer] but when her father  they moved on.  She stated her father sang in the choir and when he was gone \"it was just too hard.\"  She said they attend [facility] where her sons especially like attending.    Spiritual Practice(s) - She told of the experience of being baptized together as a family, as adults, two years ago.  It had been very significant for her.  She welcomed prayer.    GOALS OF CARE:    Goals of Care - She is looking forward to a day or two in the hospital just for rest because \"when I get home I hit the ground running.\"    Meaning/Sense-Making - Josie, family and her future were all identified as important for Luna.    PLAN: No follow up visit planned but patient is aware of the availability of spiritual support on request.    Keon Zarco M.A., [facility]  Staff   [facility]  Office: [phone]  Cell: [phone]  Pager [phone]    "

## 2018-03-29 LAB — GLUCOSE BLDC GLUCOMTR-MCNC: 100 MG/DL (ref 70–99)

## 2018-03-29 PROCEDURE — 25000128 H RX IP 250 OP 636: Performed by: OBSTETRICS & GYNECOLOGY

## 2018-03-29 PROCEDURE — 25000132 ZZH RX MED GY IP 250 OP 250 PS 637: Performed by: OBSTETRICS & GYNECOLOGY

## 2018-03-29 PROCEDURE — 12000027 ZZH R&B OB

## 2018-03-29 PROCEDURE — 00000146 ZZHCL STATISTIC GLUCOSE BY METER IP

## 2018-03-29 RX ORDER — IBUPROFEN 200 MG
400 TABLET ORAL EVERY 6 HOURS PRN
Status: DISCONTINUED | OUTPATIENT
Start: 2018-03-29 | End: 2018-03-30 | Stop reason: HOSPADM

## 2018-03-29 RX ADMIN — HYDROXYZINE HYDROCHLORIDE 50 MG: 25 TABLET ORAL at 03:03

## 2018-03-29 RX ADMIN — OXYCODONE HYDROCHLORIDE 5 MG: 5 TABLET ORAL at 01:45

## 2018-03-29 RX ADMIN — DOCUSATE SODIUM 100 MG: 100 CAPSULE, LIQUID FILLED ORAL at 23:25

## 2018-03-29 RX ADMIN — OXYCODONE HYDROCHLORIDE 5 MG: 5 TABLET ORAL at 19:21

## 2018-03-29 RX ADMIN — KETOROLAC TROMETHAMINE 30 MG: 30 INJECTION, SOLUTION INTRAMUSCULAR at 05:14

## 2018-03-29 RX ADMIN — OXYCODONE HYDROCHLORIDE 5 MG: 5 TABLET ORAL at 13:31

## 2018-03-29 RX ADMIN — IBUPROFEN 400 MG: 200 TABLET, FILM COATED ORAL at 11:37

## 2018-03-29 RX ADMIN — SIMETHICONE CHEW TAB 80 MG 80 MG: 80 TABLET ORAL at 13:31

## 2018-03-29 RX ADMIN — OXYCODONE HYDROCHLORIDE 5 MG: 5 TABLET ORAL at 01:30

## 2018-03-29 RX ADMIN — RANITIDINE 150 MG: 150 TABLET ORAL at 11:37

## 2018-03-29 RX ADMIN — IBUPROFEN 400 MG: 200 TABLET, FILM COATED ORAL at 19:21

## 2018-03-29 RX ADMIN — SIMETHICONE CHEW TAB 80 MG 80 MG: 80 TABLET ORAL at 19:55

## 2018-03-29 RX ADMIN — HYDROXYZINE HYDROCHLORIDE 50 MG: 25 TABLET ORAL at 16:37

## 2018-03-29 RX ADMIN — OXYCODONE HYDROCHLORIDE 5 MG: 5 TABLET ORAL at 05:14

## 2018-03-29 RX ADMIN — DOCUSATE SODIUM 100 MG: 100 CAPSULE, LIQUID FILLED ORAL at 08:31

## 2018-03-29 RX ADMIN — OXYCODONE HYDROCHLORIDE 5 MG: 5 TABLET ORAL at 23:25

## 2018-03-29 RX ADMIN — RANITIDINE 150 MG: 150 TABLET ORAL at 23:25

## 2018-03-29 RX ADMIN — OXYCODONE HYDROCHLORIDE 5 MG: 5 TABLET ORAL at 09:31

## 2018-03-29 RX ADMIN — HYDROXYZINE HYDROCHLORIDE 25 MG: 25 TABLET ORAL at 08:31

## 2018-03-29 RX ADMIN — SIMETHICONE CHEW TAB 80 MG 80 MG: 80 TABLET ORAL at 06:52

## 2018-03-29 NOTE — PLAN OF CARE
Patient up to ambulate several times today.  Stand by assist for toileting and ambulation.  Promoting self cares.  Anticipated discharge tomorrow.  Patient still complains of gas pain in right back and shoulder.  Encourage frequent ambulation and simethicone given.  Plans shower before bed tonight after company is gone for the evening.

## 2018-03-29 NOTE — PROGRESS NOTES
Pt up and ambulated in [place] at 2115.  Pt reported vistaril decreased pain.  Gait steady; pt reported mild dizziness but was able to ambulate while pushing wheelchair.    Pt returned to bed to rest.  5mg oxycodone administered.  Encouraged pt to rest and ambulate again when able.  Will continue to monitor and assess.

## 2018-03-29 NOTE — PROGRESS NOTES
Report received from Maris HARDY RN and Shelbie SZYMANSKI RN - assumed care and responsibility of pt.    VSS; pt reporting abdominal and right shoulder pain - explained that this is likely gas pain; mylicon and colace administered.  Encouraged pt to ambulate as long as she's not lightheaded.  Pt resting now.  Questions encouraged and answered - encouraged to call with needs.  Will continue to monitor and assess.

## 2018-03-29 NOTE — PLAN OF CARE
Problem: Hysterectomy (Adult)  Goal: Signs and Symptoms of Listed Potential Problems Will be Absent, Minimized or Managed (Hysterectomy)  Signs and symptoms of listed potential problems will be absent, minimized or managed by discharge/transition of care (reference Hysterectomy (Adult) CPG).   Outcome: Improving  VSS, assessments WDL - see flowsheet.  Pt continues to report abdominal pain as well as right shoulder pain - colace and prn simethicone administered and ambulation and fluids encouraged.  +BS in all four quadrants.  PRN oxycodone, toradol, and vistaril administered and pt was able to doze off between cares.  Pt found to be sleeping after 50mg vistaril administration.  Pt reported that pain had decreased after medication administration.    Pt ambulated twice in timmons overnight and tolerated well.    Incision is clean and dry; open to air - ice applied overnight.  No redness or swelling of incision.    Pt reported small amounts of blood when up to the bathroom - no clots.   Pt voiding adequate amounts clear yellow urine.    Pt sleeping at this time.  Questions encouraged and answered.    Will continue to monitor and assess.

## 2018-03-29 NOTE — PROGRESS NOTES
Pt called RN to room at 2045 and reported increased pain.  Pt reports she had just been up to bathroom and was having gas pain while on toilet - reports she is still not able to pass gas.  Additional vistaril administered, ice pack placed on incision, warm blanket given.  Encouraged pt to ambulate in order to encourage bowel function.  Pt agees - would like to rest now and will ambulate when pain has decreased.  Will continue to monitor.

## 2018-03-29 NOTE — PROGRESS NOTES
"POD # 2    S: pain moderately controlled, but has still been needing iv toradol.  Has been needing to ambulate with the assistance of a wheelchair  O: Blood pressure 90/52, pulse 71, temperature 98.4  F (36.9  C), temperature source Oral, resp. rate 16, height 1.727 m (5' 8\"), weight 66.2 kg (146 lb), SpO2 97 %, not currently breastfeeding.   Gen: NAD  Abd: soft, nondistended + BS  Incision: intact, no erythema, induration or discharge  Ext: calves nontender    A/P POD # 2 s/p total abdominal hysterectomy     1. Discussed trial of NSAIDS with zantac on board to hopefully reduce GI side effects.  Encouraged ambulation.      Sruthi Ji M.D.    "

## 2018-03-29 NOTE — PLAN OF CARE
"Problem: Patient Care Overview  Goal: Plan of Care/Patient Progress Review  Outcome: Improving  Patient up with stand by assist. Reporting referred shoulder pain and abdominal discomfort. Ice on incision for comfort. PRN atarax administered for itching. Incision is c/d/i with no drainage. Denying any nausea. Encouraged to continue to drink adequate fluids and ambulate as tolerated. Using call light appropriately. /56 (BP Location: Right arm)  Pulse 74  Temp 99  F (37.2  C) (Oral)  Resp 18  Ht 1.727 m (5' 8\")  Wt 66.2 kg (146 lb)  SpO2 96%  BMI 22.2 kg/m2        "

## 2018-03-29 NOTE — PROGRESS NOTES
Pt ambulated in timmons again at 0200 while pushing wheelchair.  Gait steady.  Pt continues to report gas pain - writer encouraged fluids and ambulation.  Pt also drinking hot tea.  Pt reports she is able to doze off between cares.  Will continue to monitor and assess.

## 2018-03-30 VITALS
DIASTOLIC BLOOD PRESSURE: 59 MMHG | HEART RATE: 74 BPM | TEMPERATURE: 97.7 F | SYSTOLIC BLOOD PRESSURE: 101 MMHG | WEIGHT: 146 LBS | RESPIRATION RATE: 18 BRPM | OXYGEN SATURATION: 98 % | HEIGHT: 68 IN | BODY MASS INDEX: 22.13 KG/M2

## 2018-03-30 PROCEDURE — 25000132 ZZH RX MED GY IP 250 OP 250 PS 637: Performed by: OBSTETRICS & GYNECOLOGY

## 2018-03-30 RX ORDER — OXYCODONE HYDROCHLORIDE 5 MG/1
5-10 TABLET ORAL EVERY 4 HOURS PRN
Qty: 40 TABLET | Refills: 0 | Status: SHIPPED | OUTPATIENT
Start: 2018-03-30 | End: 2018-07-10

## 2018-03-30 RX ORDER — AMOXICILLIN 250 MG
1 CAPSULE ORAL 2 TIMES DAILY
Qty: 60 TABLET | Refills: 0 | Status: SHIPPED | OUTPATIENT
Start: 2018-03-30 | End: 2018-10-24

## 2018-03-30 RX ORDER — IBUPROFEN 400 MG/1
400 TABLET, FILM COATED ORAL EVERY 6 HOURS PRN
Qty: 40 TABLET | Refills: 1 | Status: SHIPPED | OUTPATIENT
Start: 2018-03-30 | End: 2021-03-12

## 2018-03-30 RX ADMIN — RANITIDINE 150 MG: 150 TABLET ORAL at 08:16

## 2018-03-30 RX ADMIN — HYDROXYZINE HYDROCHLORIDE 25 MG: 25 TABLET ORAL at 13:08

## 2018-03-30 RX ADMIN — IBUPROFEN 400 MG: 200 TABLET, FILM COATED ORAL at 01:18

## 2018-03-30 RX ADMIN — OXYCODONE HYDROCHLORIDE 5 MG: 5 TABLET ORAL at 07:11

## 2018-03-30 RX ADMIN — HYDROXYZINE HYDROCHLORIDE 50 MG: 25 TABLET ORAL at 07:12

## 2018-03-30 RX ADMIN — IBUPROFEN 400 MG: 200 TABLET, FILM COATED ORAL at 07:11

## 2018-03-30 RX ADMIN — OXYCODONE HYDROCHLORIDE 10 MG: 5 TABLET ORAL at 14:56

## 2018-03-30 RX ADMIN — OXYCODONE HYDROCHLORIDE 10 MG: 5 TABLET ORAL at 10:58

## 2018-03-30 RX ADMIN — DOCUSATE SODIUM 100 MG: 100 CAPSULE, LIQUID FILLED ORAL at 08:16

## 2018-03-30 RX ADMIN — IBUPROFEN 400 MG: 200 TABLET, FILM COATED ORAL at 13:08

## 2018-03-30 RX ADMIN — HYDROXYZINE HYDROCHLORIDE 50 MG: 25 TABLET ORAL at 01:18

## 2018-03-30 RX ADMIN — SIMETHICONE CHEW TAB 80 MG 80 MG: 80 TABLET ORAL at 03:30

## 2018-03-30 RX ADMIN — OXYCODONE HYDROCHLORIDE 5 MG: 5 TABLET ORAL at 03:30

## 2018-03-30 NOTE — PROGRESS NOTES
[facility] Gynecology Post-Op / Progress Note         Assessment and Plan:    Assessment:   Post-operative day #3  Abdominal hysterectomy     Doing well.  Clean wound without signs of infection.  No excessive bleeding      Plan:   D/c home           Interval History:   Doing well.  Continues to improve.  Pain is well-controlled.  No fevers.            Significant Problems:    Principal Problem:    Intramural and subserous leiomyoma of uterus  Active Problems:    S/P TRACI (total abdominal hysterectomy)            Review of Systems:    The patient denies any chest pain, shortness of breath, excessive pain, fever, chills, purulent drainage from the wound, nausea or vomiting.          Medications:   All medications related to the patient's surgery have been reviewed          Physical Exam:   All vitals stable  Temp: 97.7  F (36.5  C) Temp src: Oral BP: 95/55 Pulse: 74   Resp: 16 SpO2: 98 % O2 Device: None (Room air)    Wound clean and dry with minimal or no drainage.  Surrounding skin with minimal erythema.          Data:     All laboratory data related to this surgery reviewed  Hemoglobin   Date Value Ref Range Status   03/28/2018 10.8 (L) 11.7 - 15.7 g/dL Final   03/27/2018 14.5 11.7 - 15.7 g/dL Final   03/06/2018 15.8 (H) 11.7 - 15.7 g/dL Final   05/06/2015 14.0 11.7 - 15.7 g/dL Final   06/17/2014 14.6 11.7 - 15.7 g/dL Final     No imaging studies have been ordered    Gloria Iglesias MD

## 2018-03-30 NOTE — PLAN OF CARE
Problem: Hysterectomy (Adult)  Goal: Signs and Symptoms of Listed Potential Problems Will be Absent, Minimized or Managed (Hysterectomy)  Signs and symptoms of listed potential problems will be absent, minimized or managed by discharge/transition of care (reference Hysterectomy (Adult) CPG).   Outcome: Improving  VSS - BP readings low overnight but pt remains asymptomatic.  Assessment WDL - see flowsheet.  Pt ambulating in room and unit with standby assist and use of wheelchair. Pt reports pain with movement, but movement has improved since yesterday night.  Pt voiding without difficulty.  Denies nausea.  Pt woken for pain medication overnight per her request.  PRN vistaril, oxycodone, ibuprofen, and simethicone administered for gas and incisional pain.  Pt reports that pain is tolerable with medication.  Pt found to be sleeping between cares.    Incision clean and dry - no s/s of infection.  Ice applied overnight.    Pt reports she is now passing gas.  +BS.   Questions encouraged and answered.   Plan: d/c home 3/30/18

## 2018-03-30 NOTE — DISCHARGE SUMMARY
[facility] Discharge Summary    Luna Rivera MRN# [id number]   Age: 47 year old YOB: 1970     Date of Admission:  3/27/2018  Date of Discharge::  3/30/2018   Admitting Physician:  3/30/2018  Discharge Physician:  Gloria Iglesias MD     Home clinic: [facility]          Admission Diagnoses:   intramural and subserous leiomyoma of uterus  S/P TRACI (total abdominal hysterectomy)          Discharge Diagnosis:   Principal Problem:    Intramural and subserous leiomyoma of uterus  Active Problems:    S/P TRACI (total abdominal hysterectomy)            Procedures:   Procedure(s):  Hysterectomy, total       Total abdominal hysterectomy           Medications Prior to Admission:     Prescriptions Prior to Admission   Medication Sig Dispense Refill Last Dose     acyclovir (ZOVIRAX) 400 MG tablet Take 1 tablet (400 mg) by mouth 2 times daily 180 tablet 3 3/26/2018 at PM     tretinoin (RETIN-A) 0.025 % topical gel Apply  topically At Bedtime. (Patient taking differently: Apply topically At Bedtime Apply  topically At Bedtime.) 45 g 12 Past Month at Unknown time     fluticasone (FLONASE) 50 MCG/ACT spray Spray 2 sprays into both nostrils daily 3 Bottle 1 Past Week at Unknown time     [DISCONTINUED] oxyCODONE-acetaminophen (PERCOCET) 5-325 MG per tablet Take 1 tablet by mouth every 6 hours as needed   0 filled never used     hydrocortisone (ANUSOL-HC) 2.5 % cream Place rectally 2 times daily 28.35 g 1 More than a month at Unknown time     Omeprazole (PRILOSEC PO) Take 20 mg by mouth daily as needed    More than a month at Unknown time             Discharge Medications:     Current Discharge Medication List      START taking these medications    Details   ibuprofen (ADVIL/MOTRIN) 400 MG tablet Take 1 tablet (400 mg) by mouth every 6 hours as needed for moderate pain  Qty: 40 tablet, Refills: 1    Associated Diagnoses: Postoperative state      oxyCODONE IR (ROXICODONE) 5 MG tablet Take 1-2  tablets (5-10 mg) by mouth every 4 hours as needed for moderate to severe pain  Qty: 40 tablet, Refills: 0    Associated Diagnoses: Postoperative state      senna-docusate (SENOKOT-S;PERICOLACE) 8.6-50 MG per tablet Take 1 tablet by mouth 2 times daily  Qty: 60 tablet, Refills: 0    Associated Diagnoses: Postoperative state         CONTINUE these medications which have NOT CHANGED    Details   acyclovir (ZOVIRAX) 400 MG tablet Take 1 tablet (400 mg) by mouth 2 times daily  Qty: 180 tablet, Refills: 3    Associated Diagnoses: Herpes simplex virus (HSV) infection      tretinoin (RETIN-A) 0.025 % topical gel Apply  topically At Bedtime.  Qty: 45 g, Refills: 12    Associated Diagnoses: Acne, unspecified acne type      fluticasone (FLONASE) 50 MCG/ACT spray Spray 2 sprays into both nostrils daily  Qty: 3 Bottle, Refills: 1    Associated Diagnoses: Allergic rhinitis      hydrocortisone (ANUSOL-HC) 2.5 % cream Place rectally 2 times daily  Qty: 28.35 g, Refills: 1    Associated Diagnoses: External hemorrhoids      Omeprazole (PRILOSEC PO) Take 20 mg by mouth daily as needed          STOP taking these medications       oxyCODONE-acetaminophen (PERCOCET) 5-325 MG per tablet Comments:   Reason for Stopping:                     Consultations:   No consultations were requested during this admission          Brief History of Illness:   Admitted for TOTAL ABDOMINAL HYSTERECTOMY due to known symptomatic fibroids.           Hospital Course:   The patient's hospital course was unremarkable.  She recovered as anticipated and experienced no post-operative complications.           Discharge Instructions and Follow-Up:   Discharge diet: Regular   Discharge activity: No heavy lifting, pushing, pulling for 6 week(s)  No driving or operating machinery while on narcotic analgesics  No tampons, douching or sex for 6 weeks   Discharge follow-up: Follow up with Dr. Iglesias in 2 weeks   Wound care Drink plenty of fluids           Discharge  Disposition:   Discharged to home      Attestation:  I have reviewed today's vital signs, notes, medications, labs and imaging.    Gloria Iglesias MD

## 2018-04-01 LAB — COPATH REPORT: NORMAL

## 2018-04-03 ENCOUNTER — TELEPHONE (OUTPATIENT)
Dept: OBGYN | Facility: CLINIC | Age: 48
End: 2018-04-03

## 2018-04-03 NOTE — TELEPHONE ENCOUNTER
Reason for call:   Appointment   Requested Provider:     PCP: [unfilled]    Reason for visit: 2 week follow up after surgery visit    Duration of symptoms: n/a    Have you been treated for this in the past? Yes    Additional comments: Looking to be seen next week for her 2 week follow up      Phone number to reach patient:  Cell number on file:    Telephone Information:   Mobile [phone]       Best Time:  anytime    Can we leave a detailed message on this number?  YES

## 2018-04-04 ENCOUNTER — TELEPHONE (OUTPATIENT)
Dept: OBGYN | Facility: CLINIC | Age: 48
End: 2018-04-04

## 2018-04-04 DIAGNOSIS — Z98.890 POSTOPERATIVE STATE: ICD-10-CM

## 2018-04-04 DIAGNOSIS — N32.89 BLADDER SPASMS: Primary | ICD-10-CM

## 2018-04-04 RX ORDER — NITROFURANTOIN 25; 75 MG/1; MG/1
100 CAPSULE ORAL 2 TIMES DAILY
Qty: 14 CAPSULE | Refills: 0 | Status: SHIPPED | OUTPATIENT
Start: 2018-04-04 | End: 2018-07-10

## 2018-04-04 NOTE — TELEPHONE ENCOUNTER
This could be normal spasms or a bladder infection. I recommend Macrobid 1 po BID x 7 days  Gloria Iglesias MD

## 2018-04-04 NOTE — TELEPHONE ENCOUNTER
Pt notified of below.  Pt reports understanding.  Pt does not have further questions or concerns.  Prescription sent.    Eulalia Duke   [facility]  RN

## 2018-04-04 NOTE — TELEPHONE ENCOUNTER
"Return call to patient.  Spoke with patient on the phone.    S-(situation): Patient reports concerns of increased pain during urination long term through until the end of the stream. Patient reports pain is very sharp \"almost like gas pains.\" Patient reports this started yesterday. Patient wondering if she is having bladder spasms? Common? Patient reports she does not have any UTI symptoms, denies odor, increased frequency, urgency or pain the entire time of urination. Patient reports hemorrhoids and bowel movements are painful with sharp pain as well. Patient denies fever. Patient reports decreased appetite. Patient denies nausea or vomiting. Patient is using a stool softener. Patient feels she has normal expected pain in her abdomen from the surgery. Patient only has the bladder pain upon urination, in between she does not experience any discomfort. Patient reports she is urination often and does not feel her bladder is over full when she goes.    B-(background): 3/27/18 Total abdominal Hysterectomy, Sparing Ovaries and Fallopian Tubes with Removal of Uterine Fibroids    A-(assessment): post op day 8 - bladder pain middle to end of stream    R-(recommendations): Reassurance.    Will send to provider for review and recommendations.    Eulalia Duke   [facility]  RN    "

## 2018-04-04 NOTE — TELEPHONE ENCOUNTER
Reason for Call:  Other call back    Detailed comments: Pt had a hyesterectomy on 3/27/18 and since yesterday when ever she goes to the bathroom she has a really sharp pain down there, is this normal and part of the healing process?    Phone Number Patient can be reached at: Cell number on file:    Telephone Information:   Mobile [phone]       Best Time: any    Can we leave a detailed message on this number? Not Applicable    Call taken on 4/4/2018 at 3:40 PM by Jaclyn Perez

## 2018-04-13 ENCOUNTER — OFFICE VISIT (OUTPATIENT)
Dept: OBGYN | Facility: CLINIC | Age: 48
End: 2018-04-13
Payer: COMMERCIAL

## 2018-04-13 ENCOUNTER — TELEPHONE (OUTPATIENT)
Dept: OBGYN | Facility: CLINIC | Age: 48
End: 2018-04-13

## 2018-04-13 VITALS
DIASTOLIC BLOOD PRESSURE: 69 MMHG | WEIGHT: 141 LBS | HEART RATE: 81 BPM | SYSTOLIC BLOOD PRESSURE: 94 MMHG | HEIGHT: 68 IN | TEMPERATURE: 98.5 F | BODY MASS INDEX: 21.37 KG/M2 | RESPIRATION RATE: 18 BRPM

## 2018-04-13 DIAGNOSIS — Z98.890 POSTOPERATIVE STATE: Primary | ICD-10-CM

## 2018-04-13 PROBLEM — D25.1 INTRAMURAL AND SUBSEROUS LEIOMYOMA OF UTERUS: Status: RESOLVED | Noted: 2018-03-19 | Resolved: 2018-04-13

## 2018-04-13 PROBLEM — D25.2 INTRAMURAL AND SUBSEROUS LEIOMYOMA OF UTERUS: Status: RESOLVED | Noted: 2018-03-19 | Resolved: 2018-04-13

## 2018-04-13 PROCEDURE — 99024 POSTOP FOLLOW-UP VISIT: CPT | Performed by: OBSTETRICS & GYNECOLOGY

## 2018-04-13 NOTE — TELEPHONE ENCOUNTER
Reason for Call:  Other call back    Detailed comments: Pt was just seen today and forgot to ask the MD if there is some kind of abdominal support thing that may be helpful for her to use?  Pt had a hysterectomy on 3/27/18.    Phone Number Patient can be reached at: Home number on file [phone] (home)    Best Time: today    Can we leave a detailed message on this number? YES    Call taken on 4/13/2018 at 2:19 PM by Jaclyn Perez

## 2018-04-13 NOTE — MR AVS SNAPSHOT
"              After Visit Summary   4/13/2018    Luna Rivera    MRN: [id number]           Patient Information     Date Of Birth          1970        Visit Information        Provider Department      4/13/2018 10:15 AM Gloria Iglesias MD [facility]        Today's Diagnoses     Postoperative state    -  1       Follow-ups after your visit        Who to contact     If you have questions or need follow up information about today's clinic visit or your schedule please contact [facility] directly at [phone].  Normal or non-critical lab and imaging results will be communicated to you by [employer], letter or phone within 4 business days after the clinic has received the results. If you do not hear from us within 7 days, please contact the clinic through [employer] or phone. If you have a critical or abnormal lab result, we will notify you by phone as soon as possible.  Submit refill requests through [employer] or call your pharmacy and they will forward the refill request to us. Please allow 3 business days for your refill to be completed.          Additional Information About Your Visit        MyChart Information     [employer] gives you secure access to your electronic health record. If you see a primary care provider, you can also send messages to your care team and make appointments. If you have questions, please call your primary care clinic.  If you do not have a primary care provider, please call [phone] and they will assist you.        Care EveryWhere ID     This is your Care EveryWhere ID. This could be used by other organizations to access your [place] medical records  [id number]        Your Vitals Were     Pulse Temperature Respirations Height BMI (Body Mass Index)       81 98.5  F (36.9  C) (Tympanic) 18 5' 8\" (1.727 m) 21.44 kg/m2        Blood Pressure from Last 3 Encounters:   04/13/18 94/69   03/30/18 101/59   03/22/18 114/83    Weight from Last 3 Encounters: "   04/13/18 141 lb (64 kg)   03/27/18 146 lb (66.2 kg)   03/22/18 146 lb (66.2 kg)              Today, you had the following     No orders found for display         Today's Medication Changes          These changes are accurate as of 4/13/18 10:41 AM.  If you have any questions, ask your nurse or doctor.               These medicines have changed or have updated prescriptions.        Dose/Directions    tretinoin 0.025 % topical gel   Commonly known as:  RETIN-A   This may have changed:    - how to take this  - when to take this  - additional instructions   Used for:  Acne, unspecified acne type        Apply  topically At Bedtime.   Quantity:  45 g   Refills:  12                Primary Care Provider Office Phone # Fax #    Catrina Ioana Jin [phone] [address] [facility] [address]        Equal Access to Services     ANA PRIETO : Hadii lakshmi baxtero Sokarin, waaxda luqadaha, qaybta kaalmada qasim, thierry avitia . So [facility] [phone].    ATENCIÓN: Si habla español, tiene a lema disposición servicios gratuitos de asistencia lingüística. Llame al [phone].    We comply with applicable federal civil rights laws and [place] laws. We do not discriminate on the basis of race, color, national origin, age, disability, sex, sexual orientation, or gender identity.            Thank you!     Thank you for choosing [facility]  for your care. Our goal is always to provide you with excellent care. Hearing back from our patients is one way we can continue to improve our services. Please take a few minutes to complete the written survey that you may receive in the mail after your visit with us. Thank you!             Your Updated Medication List - Protect others around you: Learn how to safely use, store and throw away your medicines at www.disposemymeds.org.          This list is accurate as of 4/13/18 10:41 AM.  Always use your most recent med  list.                   Brand Name Dispense Instructions for use Diagnosis    acyclovir 400 MG tablet    ZOVIRAX    180 tablet    Take 1 tablet (400 mg) by mouth 2 times daily    Herpes simplex virus (HSV) infection       fluticasone 50 MCG/ACT spray    FLONASE    3 Bottle    Spray 2 sprays into both nostrils daily    Allergic rhinitis       hydrocortisone 2.5 % cream    ANUSOL-HC    28.35 g    Place rectally 2 times daily    External hemorrhoids       ibuprofen 400 MG tablet    ADVIL/MOTRIN    40 tablet    Take 1 tablet (400 mg) by mouth every 6 hours as needed for moderate pain    Postoperative state       nitroFURantoin (macrocrystal-monohydrate) 100 MG capsule    MACROBID    14 capsule    Take 1 capsule (100 mg) by mouth 2 times daily    Bladder spasms, Postoperative state       oxyCODONE IR 5 MG tablet    ROXICODONE    40 tablet    Take 1-2 tablets (5-10 mg) by mouth every 4 hours as needed for moderate to severe pain    Postoperative state       PRILOSEC PO      Take 20 mg by mouth daily as needed        senna-docusate 8.6-50 MG per tablet    SENOKOT-S;PERICOLACE    60 tablet    Take 1 tablet by mouth 2 times daily    Postoperative state       tretinoin 0.025 % topical gel    RETIN-A    45 g    Apply  topically At Bedtime.    Acne, unspecified acne type

## 2018-04-13 NOTE — TELEPHONE ENCOUNTER
Pt notified of below.  Pt reports understanding.  Pt does not have further questions or concerns.  Patient will try ACE wrap over the weekend to see hoe she likes it.    Eulalia Duke   [facility]  RN

## 2018-04-13 NOTE — NURSING NOTE
"Initial BP 94/69 (BP Location: Right arm, Patient Position: Chair, Cuff Size: Adult Small)  Pulse 81  Temp 98.5  F (36.9  C) (Tympanic)  Resp 18  Ht 5' 8\" (1.727 m)  Wt 141 lb (64 kg)  BMI 21.44 kg/m2 Estimated body mass index is 21.44 kg/(m^2) as calculated from the following:    Height as of this encounter: 5' 8\" (1.727 m).    Weight as of this encounter: 141 lb (64 kg). .      "

## 2018-04-13 NOTE — TELEPHONE ENCOUNTER
Patient calling asking if Dr. Iglesias thinks an abdominal binder may be of help to her.  She was told of this by her mother-in-law.  She is willing to try one if it may help her.  She would like a prescription for it.    Thank you.    Eulalia Duke   [facility]  RN

## 2018-04-13 NOTE — TELEPHONE ENCOUNTER
Some people find them helpful, others feel they put a lot of pressure on an already tender area.  I would first try an ace wrap bandage and see if feels comfortable   Gloria Iglesias MD   [facility] (Routing comment)

## 2018-04-13 NOTE — PROGRESS NOTES
"Luna is a 47 year old female 2 weeks S/P total abdominal hysterectomy, complicated by bladder spasms.  She is currently reporting using no narcotics, Ibuprofen only.  She is currently requiring Ibuprofen for pain management.  The pathology report showed 1048gm uterus with benign fibroids. She reports scant vaginal drainage.    Exam:  BP 94/69 (BP Location: Right arm, Patient Position: Chair, Cuff Size: Adult Small)  Pulse 81  Temp 98.5  F (36.9  C) (Tympanic)  Resp 18  Ht 5' 8\" (1.727 m)  Wt 141 lb (64 kg)  BMI 21.44 kg/m2  incision: intact, no erythema, induration or discharge    Assessment:  Postop      Plan:  Pelvic rest for 6 weeks total.  Report excessive pain, fever, chills, bleeding, or foul wound odor promptly.  Increase activity as possible, excluding heavy lifting or exertion.  Follow-up visit in 4 weeks.  Gloria Iglesias MD  [facility]      "

## 2018-05-10 ENCOUNTER — OFFICE VISIT (OUTPATIENT)
Dept: OBGYN | Facility: CLINIC | Age: 48
End: 2018-05-10
Payer: COMMERCIAL

## 2018-05-10 VITALS
HEIGHT: 68 IN | BODY MASS INDEX: 21.67 KG/M2 | RESPIRATION RATE: 18 BRPM | WEIGHT: 143 LBS | HEART RATE: 92 BPM | SYSTOLIC BLOOD PRESSURE: 129 MMHG | DIASTOLIC BLOOD PRESSURE: 74 MMHG | TEMPERATURE: 97.3 F

## 2018-05-10 DIAGNOSIS — Z98.890 POSTOPERATIVE STATE: Primary | ICD-10-CM

## 2018-05-10 PROCEDURE — 99024 POSTOP FOLLOW-UP VISIT: CPT | Performed by: OBSTETRICS & GYNECOLOGY

## 2018-05-10 RX ORDER — CETIRIZINE HYDROCHLORIDE 10 MG/1
10 TABLET ORAL DAILY
COMMUNITY
End: 2018-10-24

## 2018-05-10 NOTE — PROGRESS NOTES
"Luna is a 47 year old female 6 weeks S/P total abdominal hysterectomy, complicated by no problems reported.  She is currently reporting no pain.  She is currently requiring nothing for pain management.  The pathology report showed fibroids. She reports no vaginal drainage.    Exam:  /74 (BP Location: Right arm, Patient Position: Chair, Cuff Size: Adult Small)  Pulse 92  Temp 97.3  F (36.3  C) (Tympanic)  Resp 18  Ht 5' 8\" (1.727 m)  Wt 143 lb (64.9 kg)  LMP 02/25/2018  BMI 21.74 kg/m2  incision: intact, no erythema, induration or discharge  vaginal cuff intact and non tender    Assessment:  Postop      Plan:  F/u prn; may resume all activities  Gloria Iglesias MD  [facility]      "

## 2018-05-10 NOTE — MR AVS SNAPSHOT
"              After Visit Summary   5/10/2018    Luna Rivera    MRN: [id number]           Patient Information     Date Of Birth          1970        Visit Information        Provider Department      5/10/2018 10:30 AM Gloria Iglesias MD [facility]        Today's Diagnoses     Postoperative state    -  1       Follow-ups after your visit        Who to contact     If you have questions or need follow up information about today's clinic visit or your schedule please contact [facility] directly at [phone].  Normal or non-critical lab and imaging results will be communicated to you by WeGreekhart, letter or phone within 4 business days after the clinic has received the results. If you do not hear from us within 7 days, please contact the clinic through medineeringt or phone. If you have a critical or abnormal lab result, we will notify you by phone as soon as possible.  Submit refill requests through Innoz or call your pharmacy and they will forward the refill request to us. Please allow 3 business days for your refill to be completed.          Additional Information About Your Visit        MyChart Information     Innoz gives you secure access to your electronic health record. If you see a primary care provider, you can also send messages to your care team and make appointments. If you have questions, please call your primary care clinic.  If you do not have a primary care provider, please call [phone] and they will assist you.        Care EveryWhere ID     This is your Care EveryWhere ID. This could be used by other organizations to access your [facility] medical records  [id number]        Your Vitals Were     Pulse Temperature Respirations Height Last Period BMI (Body Mass Index)    92 97.3  F (36.3  C) (Tympanic) 18 5' 8\" (1.727 m) 02/25/2018 21.74 kg/m2       Blood Pressure from Last 3 Encounters:   05/10/18 129/74   04/13/18 94/69   03/30/18 101/59    Weight from " Last 3 Encounters:   05/10/18 143 lb (64.9 kg)   04/13/18 141 lb (64 kg)   03/27/18 146 lb (66.2 kg)              Today, you had the following     No orders found for display         Today's Medication Changes          These changes are accurate as of 5/10/18 11:08 AM.  If you have any questions, ask your nurse or doctor.               These medicines have changed or have updated prescriptions.        Dose/Directions    tretinoin 0.025 % topical gel   Commonly known as:  RETIN-A   This may have changed:    - how to take this  - when to take this  - additional instructions   Used for:  Acne, unspecified acne type        Apply  topically At Bedtime.   Quantity:  45 g   Refills:  12                Primary Care Provider Office Phone # Fax #    Catrina Ioana Jin [phone] [address] [facility] [address]        Equal Access to Services     ANA PRIETO : Hadii lakshmi hernandes Sokarin, waaxda luqadaha, qaybta kaalmada adeopalyatammi, thierry avitia . So [facility] [phone].    ATENCIÓN: Si habla español, tiene a lema disposición servicios gratuitos de asistencia lingüística. Llame al [phone].    We comply with applicable federal civil rights laws and [place] laws. We do not discriminate on the basis of race, color, national origin, age, disability, sex, sexual orientation, or gender identity.            Thank you!     Thank you for choosing [facility]  for your care. Our goal is always to provide you with excellent care. Hearing back from our patients is one way we can continue to improve our services. Please take a few minutes to complete the written survey that you may receive in the mail after your visit with us. Thank you!             Your Updated Medication List - Protect others around you: Learn how to safely use, store and throw away your medicines at www.disposemymeds.org.          This list is accurate as of 5/10/18 11:08 AM.  Always use your  most recent med list.                   Brand Name Dispense Instructions for use Diagnosis    acyclovir 400 MG tablet    ZOVIRAX    180 tablet    Take 1 tablet (400 mg) by mouth 2 times daily    Herpes simplex virus (HSV) infection       cetirizine 10 MG tablet    zyrTEC     Take 10 mg by mouth daily        fluticasone 50 MCG/ACT spray    FLONASE    3 Bottle    Spray 2 sprays into both nostrils daily    Allergic rhinitis       hydrocortisone 2.5 % cream    ANUSOL-HC    28.35 g    Place rectally 2 times daily    External hemorrhoids       ibuprofen 400 MG tablet    ADVIL/MOTRIN    40 tablet    Take 1 tablet (400 mg) by mouth every 6 hours as needed for moderate pain    Postoperative state       nitroFURantoin (macrocrystal-monohydrate) 100 MG capsule    MACROBID    14 capsule    Take 1 capsule (100 mg) by mouth 2 times daily    Bladder spasms, Postoperative state       oxyCODONE IR 5 MG tablet    ROXICODONE    40 tablet    Take 1-2 tablets (5-10 mg) by mouth every 4 hours as needed for moderate to severe pain    Postoperative state       PRILOSEC PO      Take 20 mg by mouth daily as needed        senna-docusate 8.6-50 MG per tablet    SENOKOT-S;PERICOLACE    60 tablet    Take 1 tablet by mouth 2 times daily    Postoperative state       tretinoin 0.025 % topical gel    RETIN-A    45 g    Apply  topically At Bedtime.    Acne, unspecified acne type

## 2018-05-15 DIAGNOSIS — J30.9 ALLERGIC RHINITIS: ICD-10-CM

## 2018-05-15 NOTE — TELEPHONE ENCOUNTER
"Requested Prescriptions   Pending Prescriptions Disp Refills     fluticasone (FLONASE) 50 MCG/ACT spray [Pharmacy Med Name: FLUTICASONE 50MCG   SPR]        Last Written Prescription Date:  12-19-16  Last Fill Quantity: 3,   # refills: 1  Last Office Visit: 3-22-18  Future Office visit:       1     Sig: USE TWO SPRAY(S) IN EACH NOSTRIL ONCE DAILY    Inhaled Steroids Protocol Passed    5/15/2018  7:11 AM       Passed - Patient is age 12 or older       Passed - Recent (12 mo) or future (30 days) visit within the authorizing provider's specialty    Patient had office visit in the last 12 months or has a visit in the next 30 days with authorizing provider or within the authorizing provider's specialty.  See \"Patient Info\" tab in inbasket, or \"Choose Columns\" in Meds & Orders section of the refill encounter.              "

## 2018-05-16 RX ORDER — FLUTICASONE PROPIONATE 50 MCG
SPRAY, SUSPENSION (ML) NASAL
Qty: 3 BOTTLE | Refills: 2 | Status: SHIPPED | OUTPATIENT
Start: 2018-05-16 | End: 2019-03-16

## 2018-06-19 ENCOUNTER — OFFICE VISIT (OUTPATIENT)
Dept: DERMATOLOGY | Facility: CLINIC | Age: 48
End: 2018-06-19
Payer: COMMERCIAL

## 2018-06-19 VITALS — HEART RATE: 90 BPM | SYSTOLIC BLOOD PRESSURE: 107 MMHG | DIASTOLIC BLOOD PRESSURE: 70 MMHG | OXYGEN SATURATION: 98 %

## 2018-06-19 DIAGNOSIS — B07.0 PLANTAR WART OF LEFT FOOT: ICD-10-CM

## 2018-06-19 DIAGNOSIS — L30.9 DERMATITIS: Primary | ICD-10-CM

## 2018-06-19 PROCEDURE — 99203 OFFICE O/P NEW LOW 30 MIN: CPT | Mod: 25 | Performed by: PHYSICIAN ASSISTANT

## 2018-06-19 PROCEDURE — 17110 DESTRUCTION B9 LES UP TO 14: CPT | Performed by: PHYSICIAN ASSISTANT

## 2018-06-19 RX ORDER — NEOMYCIN SULFATE, POLYMYXIN B SULFATE AND HYDROCORTISONE 10; 3.5; 1 MG/ML; MG/ML; [USP'U]/ML
4 SUSPENSION/ DROPS AURICULAR (OTIC) 3 TIMES DAILY
Qty: 5 ML | Refills: 0 | Status: SHIPPED | OUTPATIENT
Start: 2018-06-19 | End: 2018-06-26

## 2018-06-19 NOTE — LETTER
2018         RE: Luna Rivera  [address]  [place] [facility] [address]        Dear Colleague,    Thank you for referring your patient, Luna Rivera, to the [facility]. Please see a copy of my visit note below.    Luna Rivera is a 47 year old year old female patient here today for wart on left foot.  Patient has tried cryo treatments with little improvement. She reports that wart can be painful. She also reports that she has eczema on ears, she was scratching left ear and not it is slightly painful. Patient has no other skin complaints today.  Remainder of the HPI, Meds, PMH, Allergies, FH, and SH was reviewed in chart.   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     Contact dermatitis and other eczema, due to unspecified cause      Enteritis due to [place] virus 2002     Herpes simplex without mention of complication     Last outbreak      Intramural and subserous leiomyoma of uterus 3/19/2018     Irritable bowel syndrome      PONV (postoperative nausea and vomiting)        Past Surgical History:   Procedure Laterality Date     ARTHROSCOPY KNEE WITH MEDIAL MENISCECTOMY  2014    Procedure: ARTHROSCOPY KNEE WITH MEDIAL MENISCECTOMY;  Surgeon: Alejandro Dodge MD;  Location: [facility] OR       DELIVERY ONLY  2002     C LIGATE FALLOPIAN TUBE,POSTPARTUM       ESOPHAGOSCOPY, GASTROSCOPY, DUODENOSCOPY (EGD), COMBINED N/A 2016    Procedure: COMBINED ESOPHAGOSCOPY, GASTROSCOPY, DUODENOSCOPY (EGD), BIOPSY SINGLE OR MULTIPLE;  Surgeon: Jose Mora MD;  Location: [facility] GI      HYSTEROSCOPY, SURGICAL; W/ ENDOMETRIAL ABLATION, ANY METHOD  3/30/10    Novasure ablation     HYSTERECTOMY TOTAL ABDOMINAL, BILATERAL SALPINGO-OOPHORECTOMY, COMBINED Bilateral 3/27/2018    TRACI only; tubes removed; ovaries left in        Family History   Problem Relation Age of  Onset     Breast Cancer Mother      age 46     Cancer Mother      Throat, larynx (d/t radiation from breast cancer)tongue cancer 9/2011     Hypertension Father      Cancer Father      lung CA     Neurologic Disorder Maternal Grandmother      Parkinson's     C.A.D. 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 No     Social History Narrative    Home with kids                           Outpatient Encounter Prescriptions as of 6/19/2018   Medication Sig Dispense Refill     neomycin-polymyxin-hydrocortisone (CORTISPORIN) 3.5-45549-5 otic suspension Place 4 drops Into the left ear 3 times daily for 7 days 5 mL 0     acyclovir (ZOVIRAX) 400 MG tablet Take 1 tablet (400 mg) by mouth 2 times daily 180 tablet 3     cetirizine (ZYRTEC) 10 MG tablet Take 10 mg by mouth daily       fluticasone (FLONASE) 50 MCG/ACT spray USE TWO SPRAY(S) IN EACH NOSTRIL ONCE DAILY 3 Bottle 2     hydrocortisone (ANUSOL-HC) 2.5 % cream Place rectally 2 times daily 28.35 g 1     ibuprofen (ADVIL/MOTRIN) 400  MG tablet Take 1 tablet (400 mg) by mouth every 6 hours as needed for moderate pain (Patient not taking: Reported on 5/10/2018) 40 tablet 1     nitroFURantoin, macrocrystal-monohydrate, (MACROBID) 100 MG capsule Take 1 capsule (100 mg) by mouth 2 times daily (Patient not taking: Reported on 4/13/2018) 14 capsule 0     Omeprazole (PRILOSEC PO) Take 20 mg by mouth daily as needed        oxyCODONE IR (ROXICODONE) 5 MG tablet Take 1-2 tablets (5-10 mg) by mouth every 4 hours as needed for moderate to severe pain (Patient not taking: Reported on 5/10/2018) 40 tablet 0     senna-docusate (SENOKOT-S;PERICOLACE) 8.6-50 MG per tablet Take 1 tablet by mouth 2 times daily 60 tablet 0     tretinoin (RETIN-A) 0.025 % topical gel Apply  topically At Bedtime. (Patient taking differently: Apply topically At Bedtime Apply  topically At Bedtime.) 45 g 12     No facility-administered encounter medications on file as of 6/19/2018.              Review Of Systems  Skin: As above  Eyes: negative  Ears/Nose/Throat: negative  Respiratory: No shortness of breath, dyspnea on exertion, cough, or hemoptysis  Cardiovascular: negative  Gastrointestinal: negative  Genitourinary: negative  Musculoskeletal: negative  Neurologic: negative  Psychiatric: negative  Hematologic/Lymphatic/Immunologic: negative  Endocrine: negative      O:   NAD, WDWN, Alert & Oriented, Mood & Affect wnl, Vitals stable   Here today alone   /70  Pulse 90  LMP 02/25/2018  SpO2 98%   General appearance normal   Vitals stable   Alert, oriented and in no acute distress     Verrucous papule on left foot  Mild scaly and scab on entrance of canal on left ear    Eyes: Conjunctivae/lids:Normal     ENT: Lips: normal    MSK:Normal    Pulm: Breathing Normal     Neuro/Psych: Orientation:Normal; Mood/Affect:Normal  A/P:  1. Plantar wart x 1 on left foot   CANTHARIDIN (CANTHARONE) TREATMENT  FOR  PLANTAR WART  Cantharone/Cantharidin is a blistering solution which causes  redness,  swelling, and fluid accumulation under the wart  24 hours after treatment the area(s) should be washed carefully  with soap and water. DO NOT SCRUB the area(s) there should  be a film over the treated area(s) after washing  If severer stinging or burning occurs before the 12 hours it is ok  to wash the area sooner - Again DO NOT SCRUB the area(s)  there should be a film over the treated area(s)  Blistering with start to form in about 3 to 8 hours post  treatment  Some patient may be very sensitive to the Cantharone and may  experience tingling or burning sensation at the treatment site(s)  Tylenol/Advil may be taken for discomfort.  In 1-2 weeks crusting and peeling occurs- Vaseline may be  applied (using a Q-tip) to help the healing process.  If the area(s) become very red, painful to touch, and/or looks  infected contact the office to speak to your  Provider    Multiple treatments may be needed to treat wart    2. Ear eczema  ?otitis externa  Apply cortisporin otic 3 times for 7 days.         Again, thank you for allowing me to participate in the care of your patient.        Sincerely,        Luna Roldan PA-C

## 2018-06-19 NOTE — NURSING NOTE
"Initial /70  Pulse 90  LMP 02/25/2018  SpO2 98% Estimated body mass index is 21.74 kg/(m^2) as calculated from the following:    Height as of 5/10/18: 1.727 m (5' 8\").    Weight as of 5/10/18: 64.9 kg (143 lb). .      "

## 2018-06-19 NOTE — MR AVS SNAPSHOT
After Visit Summary   6/19/2018    Luna Rivera    MRN: [id number]           Patient Information     Date Of Birth          1970        Visit Information        Provider Department      6/19/2018 9:00 AM Luna Tijerina PA-C [facility]        Care Instructions    CANTHARIDIN (CANTHARONE) TREATMENT  FOR  PLANTAR WART  Cantharone/Cantharidin is a blistering solution which causes  redness, swelling, and fluid accumulation under the wart  24 hours after treatment the area(s) should be washed carefully  with soap and water. DO NOT SCRUB the area(s) there should  be a film over the treated area(s) after washing  If severer stinging or burning occurs before the 12 hours it is ok  to wash the area sooner - Again DO NOT SCRUB the area(s)  there should be a film over the treated area(s)  Blistering with start to form in about 3 to 8 hours post  treatment  Some patient may be very sensitive to the Cantharone and may  experience tingling or burning sensation at the treatment site(s)  Tylenol/Advil may be taken for discomfort.  In 1-2 weeks crusting and peeling occurs- Vaseline may be  applied (using a Q-tip) to help the healing process.  If the area(s) become very red, painful to touch, and/or looks  infected contact the office to speak to your  Provider    Multiple treatments may be needed to treat wart          Follow-ups after your visit        Who to contact     If you have questions or need follow up information about today's clinic visit or your schedule please contact [facility] directly at [phone].  Normal or non-critical lab and imaging results will be communicated to you by MyChart, letter or phone within 4 business days after the clinic has received the results. If you do not hear from us within 7 days, please contact the clinic through MyChart or phone. If you have a critical or abnormal lab result, we will notify you by phone as soon as  possible.  Submit refill requests through [employer] or call your pharmacy and they will forward the refill request to us. Please allow 3 business days for your refill to be completed.          Additional Information About Your Visit        Tasted Menuhart Information     [employer] gives you secure access to your electronic health record. If you see a primary care provider, you can also send messages to your care team and make appointments. If you have questions, please call your primary care clinic.  If you do not have a primary care provider, please call [phone] and they will assist you.        Care EveryWhere ID     This is your Care EveryWhere ID. This could be used by other organizations to access your [place] medical records  [id number]        Your Vitals Were     Pulse Last Period Pulse Oximetry             90 02/25/2018 98%          Blood Pressure from Last 3 Encounters:   06/19/18 107/70   05/10/18 129/74   04/13/18 94/69    Weight from Last 3 Encounters:   05/10/18 64.9 kg (143 lb)   04/13/18 64 kg (141 lb)   03/27/18 66.2 kg (146 lb)              Today, you had the following     No orders found for display         Today's Medication Changes          These changes are accurate as of 6/19/18  9:34 AM.  If you have any questions, ask your nurse or doctor.               These medicines have changed or have updated prescriptions.        Dose/Directions    tretinoin 0.025 % topical gel   Commonly known as:  RETIN-A   This may have changed:    - how to take this  - when to take this  - additional instructions   Used for:  Acne, unspecified acne type        Apply  topically At Bedtime.   Quantity:  45 g   Refills:  12                Primary Care Provider Office Phone # Fax #    Catrina Ioana Jin [phone] [address] [facility] [address]        Equal Access to Services     ANA PRIETO AH: Devin Pierce, miryam moon, thierry young  rekha avitia ah. So [facility] [phone].    ATENCIÓN: Si piperla loren, tiene a lema disposición servicios gratuitos de asistencia lingüística. Fahad lew [phone].    We comply with applicable federal civil rights laws and [place] laws. We do not discriminate on the basis of race, color, national origin, age, disability, sex, sexual orientation, or gender identity.            Thank you!     Thank you for choosing [facility]  for your care. Our goal is always to provide you with excellent care. Hearing back from our patients is one way we can continue to improve our services. Please take a few minutes to complete the written survey that you may receive in the mail after your visit with us. Thank you!             Your Updated Medication List - Protect others around you: Learn how to safely use, store and throw away your medicines at www.disposemymeds.org.          This list is accurate as of 6/19/18  9:34 AM.  Always use your most recent med list.                   Brand Name Dispense Instructions for use Diagnosis    acyclovir 400 MG tablet    ZOVIRAX    180 tablet    Take 1 tablet (400 mg) by mouth 2 times daily    Herpes simplex virus (HSV) infection       cetirizine 10 MG tablet    zyrTEC     Take 10 mg by mouth daily        fluticasone 50 MCG/ACT spray    FLONASE    3 Bottle    USE TWO SPRAY(S) IN EACH NOSTRIL ONCE DAILY    Allergic rhinitis       hydrocortisone 2.5 % cream    ANUSOL-HC    28.35 g    Place rectally 2 times daily    External hemorrhoids       ibuprofen 400 MG tablet    ADVIL/MOTRIN    40 tablet    Take 1 tablet (400 mg) by mouth every 6 hours as needed for moderate pain    Postoperative state       nitroFURantoin (macrocrystal-monohydrate) 100 MG capsule    MACROBID    14 capsule    Take 1 capsule (100 mg) by mouth 2 times daily    Bladder spasms, Postoperative state       oxyCODONE IR 5 MG tablet    ROXICODONE    40 tablet    Take 1-2 tablets (5-10 mg) by mouth every 4 hours as  needed for moderate to severe pain    Postoperative state       PRILOSEC PO      Take 20 mg by mouth daily as needed        senna-docusate 8.6-50 MG per tablet    SENOKOT-S;PERICOLACE    60 tablet    Take 1 tablet by mouth 2 times daily    Postoperative state       tretinoin 0.025 % topical gel    RETIN-A    45 g    Apply  topically At Bedtime.    Acne, unspecified acne type

## 2018-06-19 NOTE — PROGRESS NOTES
Luna Rivera is a 47 year old year old female patient here today for wart on left foot.  Patient has tried cryo treatments with little improvement. She reports that wart can be painful. She also reports that she has eczema on ears, she was scratching left ear and not it is slightly painful. Patient has no other skin complaints today.  Remainder of the HPI, Meds, PMH, Allergies, FH, and SH was reviewed in chart.   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     Contact dermatitis and other eczema, due to unspecified cause      Enteritis due to [place] virus 2002     Herpes simplex without mention of complication     Last outbreak      Intramural and subserous leiomyoma of uterus 3/19/2018     Irritable bowel syndrome      PONV (postoperative nausea and vomiting)        Past Surgical History:   Procedure Laterality Date     ARTHROSCOPY KNEE WITH MEDIAL MENISCECTOMY  2014    Procedure: ARTHROSCOPY KNEE WITH MEDIAL MENISCECTOMY;  Surgeon: Alejandro Dodge MD;  Location: [facility]       DELIVERY ONLY  ,      C LIGATE FALLOPIAN TUBE,POSTPARTUM       ESOPHAGOSCOPY, GASTROSCOPY, DUODENOSCOPY (EGD), COMBINED N/A 2016    Procedure: COMBINED ESOPHAGOSCOPY, GASTROSCOPY, DUODENOSCOPY (EGD), BIOPSY SINGLE OR MULTIPLE;  Surgeon: Jose Mora MD;  Location: [facility] HYSTEROSCOPY, SURGICAL; W/ ENDOMETRIAL ABLATION, ANY METHOD  3/30/10    Novasure ablation     HYSTERECTOMY TOTAL ABDOMINAL, BILATERAL SALPINGO-OOPHORECTOMY, COMBINED Bilateral 3/27/2018    TRACI only; tubes removed; ovaries left in        Family History   Problem Relation Age of Onset     Breast Cancer Mother      age 46     Cancer Mother      Throat, larynx (d/t radiation from breast cancer)tongue cancer 2011     Hypertension Father      Cancer Father      lung CA     Neurologic Disorder Maternal Grandmother       Parkinson's     C.A.D. 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 No     Social History Narrative    Home with kids                           Outpatient Encounter Prescriptions as of 6/19/2018   Medication Sig Dispense Refill     neomycin-polymyxin-hydrocortisone (CORTISPORIN) 3.5-61920-9 otic suspension Place 4 drops Into the left ear 3 times daily for 7 days 5 mL 0     acyclovir (ZOVIRAX) 400 MG tablet Take 1 tablet (400 mg) by mouth 2 times daily 180 tablet 3     cetirizine (ZYRTEC) 10 MG tablet Take 10 mg by mouth daily       fluticasone (FLONASE) 50 MCG/ACT spray USE TWO SPRAY(S) IN EACH NOSTRIL ONCE DAILY 3 Bottle 2     hydrocortisone (ANUSOL-HC) 2.5 % cream Place rectally 2 times daily 28.35 g 1     ibuprofen (ADVIL/MOTRIN) 400 MG tablet Take 1 tablet (400 mg) by mouth every 6 hours as needed for moderate pain (Patient not taking: Reported on 5/10/2018) 40 tablet 1     nitroFURantoin, macrocrystal-monohydrate, (MACROBID) 100 MG capsule Take 1 capsule (100 mg) by  mouth 2 times daily (Patient not taking: Reported on 4/13/2018) 14 capsule 0     Omeprazole (PRILOSEC PO) Take 20 mg by mouth daily as needed        oxyCODONE IR (ROXICODONE) 5 MG tablet Take 1-2 tablets (5-10 mg) by mouth every 4 hours as needed for moderate to severe pain (Patient not taking: Reported on 5/10/2018) 40 tablet 0     senna-docusate (SENOKOT-S;PERICOLACE) 8.6-50 MG per tablet Take 1 tablet by mouth 2 times daily 60 tablet 0     tretinoin (RETIN-A) 0.025 % topical gel Apply  topically At Bedtime. (Patient taking differently: Apply topically At Bedtime Apply  topically At Bedtime.) 45 g 12     No facility-administered encounter medications on file as of 6/19/2018.              Review Of Systems  Skin: As above  Eyes: negative  Ears/Nose/Throat: negative  Respiratory: No shortness of breath, dyspnea on exertion, cough, or hemoptysis  Cardiovascular: negative  Gastrointestinal: negative  Genitourinary: negative  Musculoskeletal: negative  Neurologic: negative  Psychiatric: negative  Hematologic/Lymphatic/Immunologic: negative  Endocrine: negative      O:   NAD, WDWN, Alert & Oriented, Mood & Affect wnl, Vitals stable   Here today alone   /70  Pulse 90  LMP 02/25/2018  SpO2 98%   General appearance normal   Vitals stable   Alert, oriented and in no acute distress     Verrucous papule on left foot  Mild scaly and scab on entrance of canal on left ear    Eyes: Conjunctivae/lids:Normal     ENT: Lips: normal    MSK:Normal    Pulm: Breathing Normal     Neuro/Psych: Orientation:Normal; Mood/Affect:Normal  A/P:  1. Plantar wart x 1 on left foot   CANTHARIDIN (CANTHARONE) TREATMENT  FOR  PLANTAR WART  Cantharone/Cantharidin is a blistering solution which causes  redness, swelling, and fluid accumulation under the wart  24 hours after treatment the area(s) should be washed carefully  with soap and water. DO NOT SCRUB the area(s) there should  be a film over the treated area(s) after washing  If severer  stinging or burning occurs before the 12 hours it is ok  to wash the area sooner - Again DO NOT SCRUB the area(s)  there should be a film over the treated area(s)  Blistering with start to form in about 3 to 8 hours post  treatment  Some patient may be very sensitive to the Cantharone and may  experience tingling or burning sensation at the treatment site(s)  Tylenol/Advil may be taken for discomfort.  In 1-2 weeks crusting and peeling occurs- Vaseline may be  applied (using a Q-tip) to help the healing process.  If the area(s) become very red, painful to touch, and/or looks  infected contact the office to speak to your  Provider    Multiple treatments may be needed to treat wart    2. Ear eczema  ?otitis externa  Apply cortisporin otic 3 times for 7 days.

## 2018-06-19 NOTE — PATIENT INSTRUCTIONS
CANTHARIDIN (CANTHARONE) TREATMENT  FOR  PLANTAR WART  Cantharone/Cantharidin is a blistering solution which causes  redness, swelling, and fluid accumulation under the wart  24 hours after treatment the area(s) should be washed carefully  with soap and water. DO NOT SCRUB the area(s) there should  be a film over the treated area(s) after washing  If severer stinging or burning occurs before the 12 hours it is ok  to wash the area sooner - Again DO NOT SCRUB the area(s)  there should be a film over the treated area(s)  Blistering with start to form in about 3 to 8 hours post  treatment  Some patient may be very sensitive to the Cantharone and may  experience tingling or burning sensation at the treatment site(s)  Tylenol/Advil may be taken for discomfort.  In 1-2 weeks crusting and peeling occurs- Vaseline may be  applied (using a Q-tip) to help the healing process.  If the area(s) become very red, painful to touch, and/or looks  infected contact the office to speak to your  Provider    Multiple treatments may be needed to treat wart

## 2018-07-10 ENCOUNTER — OFFICE VISIT (OUTPATIENT)
Dept: DERMATOLOGY | Facility: CLINIC | Age: 48
End: 2018-07-10
Payer: COMMERCIAL

## 2018-07-10 VITALS — SYSTOLIC BLOOD PRESSURE: 114 MMHG | DIASTOLIC BLOOD PRESSURE: 72 MMHG | OXYGEN SATURATION: 98 % | HEART RATE: 77 BPM

## 2018-07-10 DIAGNOSIS — L30.9 HAND ECZEMA: Primary | ICD-10-CM

## 2018-07-10 DIAGNOSIS — B07.0 PLANTAR WART OF LEFT FOOT: ICD-10-CM

## 2018-07-10 DIAGNOSIS — L30.9 HAND ECZEMA: ICD-10-CM

## 2018-07-10 DIAGNOSIS — B07.8 COMMON WART: ICD-10-CM

## 2018-07-10 PROCEDURE — 99213 OFFICE O/P EST LOW 20 MIN: CPT | Mod: 25 | Performed by: PHYSICIAN ASSISTANT

## 2018-07-10 PROCEDURE — 17110 DESTRUCTION B9 LES UP TO 14: CPT | Performed by: PHYSICIAN ASSISTANT

## 2018-07-10 RX ORDER — TACROLIMUS 1 MG/G
OINTMENT TOPICAL
Qty: 60 G | Refills: 2 | Status: SHIPPED | OUTPATIENT
Start: 2018-07-10 | End: 2018-07-13

## 2018-07-10 NOTE — PROGRESS NOTES
Luna Rivera is a 47 year old year old female patient here today for recheck wart on left foot. Patient reports that she developed a good blister. She also noticed a few warts on hands. She reports that she has had issues with hand eczema in the past which she has used protopic for. She notes that it is flared today and reports that her protopic is 4 years old.   Patient has no other skin complaints today.  Remainder of the HPI, Meds, PMH, Allergies, FH, and SH was reviewed in chart.    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     Contact dermatitis and other eczema, due to unspecified cause      Enteritis due to [place] virus 2002     Herpes simplex without mention of complication     Last outbreak      Intramural and subserous leiomyoma of uterus 3/19/2018     Irritable bowel syndrome      PONV (postoperative nausea and vomiting)        Past Surgical History:   Procedure Laterality Date     ARTHROSCOPY KNEE WITH MEDIAL MENISCECTOMY  2014    Procedure: ARTHROSCOPY KNEE WITH MEDIAL MENISCECTOMY;  Surgeon: Alejandro Dodge MD;  Location: [facility]  DELIVERY ONLY  2002     C LIGATE FALLOPIAN TUBE,POSTPARTUM       ESOPHAGOSCOPY, GASTROSCOPY, DUODENOSCOPY (EGD), COMBINED N/A 2016    Procedure: COMBINED ESOPHAGOSCOPY, GASTROSCOPY, DUODENOSCOPY (EGD), BIOPSY SINGLE OR MULTIPLE;  Surgeon: Jose Mora MD;  Location: [facility] HYSTEROSCOPY, SURGICAL; W/ ENDOMETRIAL ABLATION, ANY METHOD  3/30/10    Novasure ablation     HYSTERECTOMY TOTAL ABDOMINAL, BILATERAL SALPINGO-OOPHORECTOMY, COMBINED Bilateral 3/27/2018    TRACI only; tubes removed; ovaries left in        Family History   Problem Relation Age of Onset     Breast Cancer Mother      age 46     Cancer Mother      Throat, larynx (d/t radiation from breast cancer)tongue cancer 2011      Hypertension Father      Cancer Father      lung CA     Neurologic Disorder Maternal Grandmother      Parkinson's     C.A.D. 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 No     Social History Narrative    Home with kids                           Outpatient Encounter Prescriptions as of 7/10/2018   Medication Sig Dispense Refill     acyclovir (ZOVIRAX) 400 MG tablet Take 1 tablet (400 mg) by mouth 2 times daily 180 tablet 3     cetirizine (ZYRTEC) 10 MG tablet Take 10 mg by mouth daily       fluticasone (FLONASE) 50 MCG/ACT spray USE TWO SPRAY(S) IN EACH NOSTRIL ONCE DAILY 3 Bottle 2     hydrocortisone (ANUSOL-HC) 2.5 % cream Place rectally 2 times daily 28.35 g 1     ibuprofen (ADVIL/MOTRIN) 400 MG tablet Take 1 tablet (400 mg) by mouth every 6 hours as needed for moderate pain 40 tablet 1     Omeprazole (PRILOSEC PO) Take 20 mg by mouth daily as needed        senna-docusate (SENOKOT-S;PERICOLACE) 8.6-50 MG per tablet Take 1 tablet by mouth 2 times daily 60 tablet 0      tacrolimus (PROTOPIC) 0.1 % ointment Apply twice daily to eczema on hands as needed. 60 g 2     tretinoin (RETIN-A) 0.025 % topical gel Apply  topically At Bedtime. (Patient taking differently: Apply topically At Bedtime Apply  topically At Bedtime.) 45 g 12     [DISCONTINUED] nitroFURantoin, macrocrystal-monohydrate, (MACROBID) 100 MG capsule Take 1 capsule (100 mg) by mouth 2 times daily (Patient not taking: Reported on 4/13/2018) 14 capsule 0     [DISCONTINUED] oxyCODONE IR (ROXICODONE) 5 MG tablet Take 1-2 tablets (5-10 mg) by mouth every 4 hours as needed for moderate to severe pain (Patient not taking: Reported on 5/10/2018) 40 tablet 0     No facility-administered encounter medications on file as of 7/10/2018.              Review Of Systems  Skin: As above  Eyes: negative  Ears/Nose/Throat: negative  Respiratory: No shortness of breath, dyspnea on exertion, cough, or hemoptysis  Cardiovascular: negative  Gastrointestinal: negative  Genitourinary: negative  Musculoskeletal: negative  Neurologic: negative  Psychiatric: negative  Hematologic/Lymphatic/Immunologic: negative  Endocrine: negative      O:   NAD, WDWN, Alert & Oriented, Mood & Affect wnl, Vitals stable   Here today alone   /72  Pulse 77  LMP 02/25/2018  SpO2 98%   General appearance normal   Vitals stable   Alert, oriented and in no acute distress      Small verrucous papule on left plantar foot   Small verrucous papule on right palm and left hand    Small thin eczematous plaque on right palm     Eyes: Conjunctivae/lids:Normal     ENT: Lips: normal    MSK:Normal    Pulm: Breathing Normal    Neuro/Psych: Orientation:Normal; Mood/Affect:Normal  A/P:  1. Plantar wart x 1 and common wart x 2  Patient choose cantharidin, cantharidin was applied using q-tip to affected areas above. Cover with bandaid. Advised below information.   CANTHARIDIN (CANTHARONE) TREATMENT  FOR  PLANTAR WART  Cantharone/Cantharidin is a blistering solution which  causes  redness, swelling, and fluid accumulation under the wart  24 hours after treatment the area(s) should be washed carefully  with soap and water. DO NOT SCRUB the area(s) there should  be a film over the treated area(s) after washing  If severer stinging or burning occurs before the 12 hours it is ok  to wash the area sooner - Again DO NOT SCRUB the area(s)  there should be a film over the treated area(s)  Blistering with start to form in about 3 to 8 hours post  treatment  Some patient may be very sensitive to the Cantharone and may  experience tingling or burning sensation at the treatment site(s)  Tylenol/Advil may be taken for discomfort.  In 1-2 weeks crusting and peeling occurs- Vaseline may be  applied (using a Q-tip) to help the healing process.  If the area(s) become very red, painful to touch, and/or looks  infected contact the office to speak to your  Provider     Multiple treatments may be needed to treat wart    2. Eczema on hand  Apply protopic twice daily as needed.

## 2018-07-10 NOTE — NURSING NOTE
"Initial /72  Pulse 77  LMP 02/25/2018  SpO2 98% Estimated body mass index is 21.74 kg/(m^2) as calculated from the following:    Height as of 5/10/18: 1.727 m (5' 8\").    Weight as of 5/10/18: 64.9 kg (143 lb). .    Marycarmen Thomas LPN    "

## 2018-07-10 NOTE — MR AVS SNAPSHOT
After Visit Summary   7/10/2018    Luna Rivera    MRN: [id number]           Patient Information     Date Of Birth          1970        Visit Information        Provider Department      7/10/2018 11:40 AM Luna Tijerina PA-C [facility]        Today's Diagnoses     Hand eczema    -  1       Follow-ups after your visit        Who to contact     If you have questions or need follow up information about today's clinic visit or your schedule please contact [facility] directly at [phone].  Normal or non-critical lab and imaging results will be communicated to you by [employer], letter or phone within 4 business days after the clinic has received the results. If you do not hear from us within 7 days, please contact the clinic through [employer] or phone. If you have a critical or abnormal lab result, we will notify you by phone as soon as possible.  Submit refill requests through [employer] or call your pharmacy and they will forward the refill request to us. Please allow 3 business days for your refill to be completed.          Additional Information About Your Visit        MyChart Information     [employer] gives you secure access to your electronic health record. If you see a primary care provider, you can also send messages to your care team and make appointments. If you have questions, please call your primary care clinic.  If you do not have a primary care provider, please call [phone] and they will assist you.        Care EveryWhere ID     This is your Care EveryWhere ID. This could be used by other organizations to access your [place] medical records  [id number]        Your Vitals Were     Pulse Last Period Pulse Oximetry             77 02/25/2018 98%          Blood Pressure from Last 3 Encounters:   07/10/18 114/72   06/19/18 107/70   05/10/18 129/74    Weight from Last 3 Encounters:   05/10/18 64.9 kg (143 lb)   04/13/18 64 kg (141 lb)   03/27/18 66.2  kg (146 lb)              Today, you had the following     No orders found for display         Today's Medication Changes          These changes are accurate as of 7/10/18 12:25 PM.  If you have any questions, ask your nurse or doctor.               Start taking these medicines.        Dose/Directions    tacrolimus 0.1 % ointment   Commonly known as:  PROTOPIC   Used for:  Hand eczema   Started by:  Luna Tijerina PA-C        Apply twice daily to eczema on hands as needed.   Quantity:  60 g   Refills:  2         These medicines have changed or have updated prescriptions.        Dose/Directions    tretinoin 0.025 % topical gel   Commonly known as:  RETIN-A   This may have changed:    - how to take this  - when to take this  - additional instructions   Used for:  Acne, unspecified acne type        Apply  topically At Bedtime.   Quantity:  45 g   Refills:  12            Where to get your medicines      These medications were sent to [facility] - [place], [place] - [address]  [address] [facility] [address]     Phone:  [phone]     tacrolimus 0.1 % ointment                Primary Care Provider Office Phone # Fax #    Catrina Ioana Jin [phone] [address] [facility] [address]        Equal Access to Services     ANA PRIETO : Hadii aad ku hadasho Soomaali, waaxda luqadaha, qaybta kaalmada adeegyada, thierry you. So [facility] [phone].    ATENCIÓN: Si habla español, tiene a lema disposición servicios gratuitos de asistencia lingüística. Llame al [phone].    We comply with applicable federal civil rights laws and [place] laws. We do not discriminate on the basis of race, color, national origin, age, disability, sex, sexual orientation, or gender identity.            Thank you!     Thank you for choosing [facility]  for your care. Our goal is always to provide you with excellent care. Hearing back from our  patients is one way we can continue to improve our services. Please take a few minutes to complete the written survey that you may receive in the mail after your visit with us. Thank you!             Your Updated Medication List - Protect others around you: Learn how to safely use, store and throw away your medicines at www.disposemymeds.org.          This list is accurate as of 7/10/18 12:25 PM.  Always use your most recent med list.                   Brand Name Dispense Instructions for use Diagnosis    acyclovir 400 MG tablet    ZOVIRAX    180 tablet    Take 1 tablet (400 mg) by mouth 2 times daily    Herpes simplex virus (HSV) infection       cetirizine 10 MG tablet    zyrTEC     Take 10 mg by mouth daily        fluticasone 50 MCG/ACT spray    FLONASE    3 Bottle    USE TWO SPRAY(S) IN EACH NOSTRIL ONCE DAILY    Allergic rhinitis       hydrocortisone 2.5 % cream    ANUSOL-HC    28.35 g    Place rectally 2 times daily    External hemorrhoids       ibuprofen 400 MG tablet    ADVIL/MOTRIN    40 tablet    Take 1 tablet (400 mg) by mouth every 6 hours as needed for moderate pain    Postoperative state       PRILOSEC PO      Take 20 mg by mouth daily as needed        senna-docusate 8.6-50 MG per tablet    SENOKOT-S;PERICOLACE    60 tablet    Take 1 tablet by mouth 2 times daily    Postoperative state       tacrolimus 0.1 % ointment    PROTOPIC    60 g    Apply twice daily to eczema on hands as needed.    Hand eczema       tretinoin 0.025 % topical gel    RETIN-A    45 g    Apply  topically At Bedtime.    Acne, unspecified acne type

## 2018-07-10 NOTE — TELEPHONE ENCOUNTER
Requested Prescriptions   Pending Prescriptions Disp Refills     tacrolimus (PROTOPIC) 0.1 % ointment 60 g 2     Sig: Apply twice daily to eczema on hands as needed.    There is no refill protocol information for this order        Last Written Prescription Date:  7/10/18  Last Fill Quantity: 60g,  # refills: 2   Last office visit: 7/10/2018 with prescribing provider: Breezy Yao Office Visit:

## 2018-07-10 NOTE — LETTER
7/10/2018         RE: Luna Rivera  [address] [facility], [place] [address]        Dear Colleague,    Thank you for referring your patient, Luna Rivera, to the [facility]. Please see a copy of my visit note below.    Luna Rivera is a 47 year old year old female patient here today for recheck wart on left foot. Patient reports that she developed a good blister. She also noticed a few warts on hands. She reports that she has had issues with hand eczema in the past which she has used protopic for. She notes that it is flared today and reports that her protopic is 4 years old.   Patient has no other skin complaints today.  Remainder of the HPI, Meds, PMH, Allergies, FH, and SH was reviewed in chart.    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     Contact dermatitis and other eczema, due to unspecified cause      Enteritis due to [place] virus 2002     Herpes simplex without mention of complication     Last outbreak      Intramural and subserous leiomyoma of uterus 3/19/2018     Irritable bowel syndrome      PONV (postoperative nausea and vomiting)        Past Surgical History:   Procedure Laterality Date     ARTHROSCOPY KNEE WITH MEDIAL MENISCECTOMY  2014    Procedure: ARTHROSCOPY KNEE WITH MEDIAL MENISCECTOMY;  Surgeon: Alejandro Dodge MD;  Location: [facility]       DELIVERY ONLY  2002     C LIGATE FALLOPIAN TUBE,POSTPARTUM       ESOPHAGOSCOPY, GASTROSCOPY, DUODENOSCOPY (EGD), COMBINED N/A 2016    Procedure: COMBINED ESOPHAGOSCOPY, GASTROSCOPY, DUODENOSCOPY (EGD), BIOPSY SINGLE OR MULTIPLE;  Surgeon: Jose Mora MD;  Location: [facility]     HC HYSTEROSCOPY, SURGICAL; W/ ENDOMETRIAL ABLATION, ANY METHOD  3/30/10    Novasure ablation     HYSTERECTOMY TOTAL ABDOMINAL, BILATERAL SALPINGO-OOPHORECTOMY, COMBINED Bilateral  3/27/2018    TRACI only; tubes removed; ovaries left in        Family History   Problem Relation Age of Onset     Breast Cancer Mother      age 46     Cancer Mother      Throat, larynx (d/t radiation from breast cancer)tongue cancer 9/2011     Hypertension Father      Cancer Father      lung CA     Neurologic Disorder Maternal Grandmother      Parkinson's     C.A.D. 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 No     Social History Narrative    Home with kids                           Outpatient Encounter Prescriptions as of 7/10/2018   Medication Sig Dispense Refill     acyclovir (ZOVIRAX) 400 MG tablet Take 1 tablet (400 mg) by mouth 2 times daily 180 tablet 3     cetirizine (ZYRTEC) 10 MG tablet Take 10 mg by mouth daily       fluticasone (FLONASE) 50 MCG/ACT spray USE TWO SPRAY(S) IN EACH NOSTRIL ONCE DAILY 3 Bottle 2     hydrocortisone (ANUSOL-HC) 2.5 % cream Place rectally 2 times daily 28.35 g 1     ibuprofen (ADVIL/MOTRIN) 400 MG tablet Take 1 tablet (400 mg) by mouth  every 6 hours as needed for moderate pain 40 tablet 1     Omeprazole (PRILOSEC PO) Take 20 mg by mouth daily as needed        senna-docusate (SENOKOT-S;PERICOLACE) 8.6-50 MG per tablet Take 1 tablet by mouth 2 times daily 60 tablet 0     tacrolimus (PROTOPIC) 0.1 % ointment Apply twice daily to eczema on hands as needed. 60 g 2     tretinoin (RETIN-A) 0.025 % topical gel Apply  topically At Bedtime. (Patient taking differently: Apply topically At Bedtime Apply  topically At Bedtime.) 45 g 12     [DISCONTINUED] nitroFURantoin, macrocrystal-monohydrate, (MACROBID) 100 MG capsule Take 1 capsule (100 mg) by mouth 2 times daily (Patient not taking: Reported on 4/13/2018) 14 capsule 0     [DISCONTINUED] oxyCODONE IR (ROXICODONE) 5 MG tablet Take 1-2 tablets (5-10 mg) by mouth every 4 hours as needed for moderate to severe pain (Patient not taking: Reported on 5/10/2018) 40 tablet 0     No facility-administered encounter medications on file as of 7/10/2018.              Review Of Systems  Skin: As above  Eyes: negative  Ears/Nose/Throat: negative  Respiratory: No shortness of breath, dyspnea on exertion, cough, or hemoptysis  Cardiovascular: negative  Gastrointestinal: negative  Genitourinary: negative  Musculoskeletal: negative  Neurologic: negative  Psychiatric: negative  Hematologic/Lymphatic/Immunologic: negative  Endocrine: negative      O:   NAD, WDWN, Alert & Oriented, Mood & Affect wnl, Vitals stable   Here today alone   /72  Pulse 77  LMP 02/25/2018  SpO2 98%   General appearance normal   Vitals stable   Alert, oriented and in no acute distress      Small verrucous papule on left plantar foot   Small verrucous papule on right palm and left hand    Small thin eczematous plaque on right palm     Eyes: Conjunctivae/lids:Normal     ENT: Lips: normal    MSK:Normal    Pulm: Breathing Normal    Neuro/Psych: Orientation:Normal; Mood/Affect:Normal  A/P:  1. Plantar wart x 1 and common wart x 2  CANTHARIDIN  (CANTHARONE) TREATMENT  FOR  PLANTAR WART  Cantharone/Cantharidin is a blistering solution which causes  redness, swelling, and fluid accumulation under the wart  24 hours after treatment the area(s) should be washed carefully  with soap and water. DO NOT SCRUB the area(s) there should  be a film over the treated area(s) after washing  If severer stinging or burning occurs before the 12 hours it is ok  to wash the area sooner - Again DO NOT SCRUB the area(s)  there should be a film over the treated area(s)  Blistering with start to form in about 3 to 8 hours post  treatment  Some patient may be very sensitive to the Cantharone and may  experience tingling or burning sensation at the treatment site(s)  Tylenol/Advil may be taken for discomfort.  In 1-2 weeks crusting and peeling occurs- Vaseline may be  applied (using a Q-tip) to help the healing process.  If the area(s) become very red, painful to touch, and/or looks  infected contact the office to speak to your  Provider     Multiple treatments may be needed to treat wart    2. Eczema on hand  Apply protopic twice daily as needed.     Again, thank you for allowing me to participate in the care of your patient.        Sincerely,        Luna Roldan PA-C

## 2018-07-11 ENCOUNTER — MYC MEDICAL ADVICE (OUTPATIENT)
Dept: DERMATOLOGY | Facility: CLINIC | Age: 48
End: 2018-07-11

## 2018-07-12 RX ORDER — TACROLIMUS 1 MG/G
OINTMENT TOPICAL
Qty: 60 G | Refills: 2 | OUTPATIENT
Start: 2018-07-12

## 2018-07-12 NOTE — TELEPHONE ENCOUNTER
Duplicate.  Medication Detail      Disp Refills Start End MG   tacrolimus (PROTOPIC) 0.1 % ointment 60 g 2 7/10/2018  --   Sig: Apply twice daily to eczema on hands as needed.   Class: E-Prescribe   Order: [id number]   E-Prescribing Status: Receipt confirmed by pharmacy (7/10/2018 12:05 PM CDT)     Felicia TIAN RN

## 2018-07-13 ENCOUNTER — OFFICE VISIT (OUTPATIENT)
Dept: DERMATOLOGY | Facility: CLINIC | Age: 48
End: 2018-07-13
Payer: COMMERCIAL

## 2018-07-13 VITALS — OXYGEN SATURATION: 99 % | HEART RATE: 76 BPM | DIASTOLIC BLOOD PRESSURE: 72 MMHG | SYSTOLIC BLOOD PRESSURE: 114 MMHG

## 2018-07-13 DIAGNOSIS — B07.8 COMMON WART: Primary | ICD-10-CM

## 2018-07-13 DIAGNOSIS — L30.9 HAND ECZEMA: ICD-10-CM

## 2018-07-13 PROCEDURE — 17110 DESTRUCTION B9 LES UP TO 14: CPT | Mod: 78 | Performed by: PHYSICIAN ASSISTANT

## 2018-07-13 RX ORDER — TACROLIMUS 1 MG/G
OINTMENT TOPICAL
Qty: 60 G | Refills: 2 | Status: SHIPPED | OUTPATIENT
Start: 2018-07-13 | End: 2018-12-18

## 2018-07-13 NOTE — PROGRESS NOTES
Luna Rivera is a 47 year old year old female patient here today for recheck wart on right plantar foot. She had cantharidin treatment earlier this week and did not see any blistering.  Patient has no other skin complaints today.  Remainder of the HPI, Meds, PMH, Allergies, FH, and SH was reviewed in chart.    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     Contact dermatitis and other eczema, due to unspecified cause      Enteritis due to [place] virus 2002     Herpes simplex without mention of complication     Last outbreak      Intramural and subserous leiomyoma of uterus 3/19/2018     Irritable bowel syndrome      PONV (postoperative nausea and vomiting)        Past Surgical History:   Procedure Laterality Date     ARTHROSCOPY KNEE WITH MEDIAL MENISCECTOMY  2014    Procedure: ARTHROSCOPY KNEE WITH MEDIAL MENISCECTOMY;  Surgeon: Alejandro Dodge MD;  Location: [facility]  DELIVERY ONLY  ,      C LIGATE FALLOPIAN TUBE,POSTPARTUM       ESOPHAGOSCOPY, GASTROSCOPY, DUODENOSCOPY (EGD), COMBINED N/A 2016    Procedure: COMBINED ESOPHAGOSCOPY, GASTROSCOPY, DUODENOSCOPY (EGD), BIOPSY SINGLE OR MULTIPLE;  Surgeon: Jose Mora MD;  [facility]STEROSCOPY, SURGICAL; W/ ENDOMETRIAL ABLATION, ANY METHOD  3/30/10    Novasure ablation     HYSTERECTOMY TOTAL ABDOMINAL, BILATERAL SALPINGO-OOPHORECTOMY, COMBINED Bilateral 3/27/2018    TRACI only; tubes removed; ovaries left in        Family History   Problem Relation Age of Onset     Breast Cancer Mother      age 46     Cancer Mother      Throat, larynx (d/t radiation from breast cancer)tongue cancer 2011     Hypertension Father      Cancer Father      lung CA     Neurologic Disorder Maternal Grandmother      Parkinson's     C.A.D. 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 No     Social History Narrative    Home with kids                           Outpatient Encounter Prescriptions as of 7/13/2018   Medication Sig Dispense Refill     acyclovir (ZOVIRAX) 400 MG tablet Take 1 tablet (400 mg) by mouth 2 times daily 180 tablet 3     cetirizine (ZYRTEC) 10 MG tablet Take 10 mg by mouth daily       fluticasone (FLONASE) 50 MCG/ACT spray USE TWO SPRAY(S) IN EACH NOSTRIL ONCE DAILY 3 Bottle 2     hydrocortisone (ANUSOL-HC) 2.5 % cream Place rectally 2 times daily 28.35 g 1     ibuprofen (ADVIL/MOTRIN) 400 MG tablet Take 1 tablet (400 mg) by mouth every 6 hours as needed for moderate pain 40 tablet 1     Omeprazole (PRILOSEC PO) Take 20 mg by mouth daily as needed        senna-docusate (SENOKOT-S;PERICOLACE) 8.6-50 MG per tablet Take 1 tablet by mouth 2 times daily 60 tablet 0     tacrolimus (PROTOPIC) 0.1 % ointment Apply twice daily to eczema on hands as needed. 60 g 2     tretinoin (RETIN-A) 0.025 % topical gel Apply  topically At Bedtime. (Patient taking differently: Apply topically At  Bedtime Apply  topically At Bedtime.) 45 g 12     No facility-administered encounter medications on file as of 7/13/2018.              Review Of Systems  Skin: As above  Eyes: negative  Ears/Nose/Throat: negative  Respiratory: No shortness of breath, dyspnea on exertion, cough, or hemoptysis  Cardiovascular: negative  Gastrointestinal: negative  Genitourinary: negative  Musculoskeletal: negative  Neurologic: negative  Psychiatric: negative  Hematologic/Lymphatic/Immunologic: negative  Endocrine: negative      O:   NAD, WDWN, Alert & Oriented, Mood & Affect wnl, Vitals stable   Here today alone   /72  Pulse 76  LMP 02/25/2018  SpO2 99%   General appearance normal   Vitals stable   Alert, oriented and in no acute distress      Scab on right plantar foot       Eyes: Conjunctivae/lids:Normal     ENT: Lips: normal    MSK:Normal    Pulm: Breathing Normal    Neuro/Psych: Orientation:Normal; Mood/Affect:Normal  A/P:  1. Plantar wart on right foot   LN2:  Treated with LN2 for 5s for 1-2 cycles. Warned risks of blistering, pain, pigment change, scarring, and incomplete resolution.  Advised patient to return if lesions do not completely resolve.  Wound care sheet given.  If not resolved next visit will shave off.

## 2018-07-13 NOTE — MR AVS SNAPSHOT
After Visit Summary   7/13/2018    Luna Rivera    MRN: [id number]           Patient Information     Date Of Birth          1970        Visit Information        Provider Department      7/13/2018 10:00 AM Luna Tijerina PA-C [facility]        Today's Diagnoses     Common wart    -  1      Care Instructions    WOUND CARE INSTRUCTIONS   FOR CRYOSURGERY   This area treated with liquid nitrogen will form a blister. You do not need to bandage the area until after the blister forms and breaks (which may be a few days). When the blister breaks, begin daily dressing changes as follows:   1) Clean and dry the area with tap water using clean Q-tip or sterile gauze pad.   2) Apply Polysporin ointment or Bacitracin ointment over entire wound. Do NOT use Neosporin ointment.   3) Cover the wound with a band-aid or sterile non-stick gauze pad and micropore paper tape.   REPEAT THESE INSTRUCTIONS AT LEAST ONCE A DAY UNTIL THE WOUND HAS COMPLETELY HEALED.   It is an old wives tale that a wound heals better when it is exposed to air and allowed to dry out. The wound will heal faster with a better cosmetic result if it is kept moist with ointment and covered with a bandage.   Do not let the wound dry out.   IMPORTANT INFORMATION ON REVERSE SIDE   Supplies Needed:   *Cotton tipped applicators (Q-tips)   *Polysporin ointment or Bacitracin ointment (NOT NEOSPORIN)   *Band-aids, or non stick gauze pads and micropore paper tape   PATIENT INFORMATION   During the healing process you will notice a number of changes. All wounds develop a small halo of redness surrounding the wound. This means healing is occurring. Severe itching with extensive redness usually indicates sensitivity to the ointment or bandage tape used to dress the wound. You should call our office if this develops.   Swelling and/or discoloration around your surgical site is common, particularly when performed around the eye.    All wounds normally drain. The larger the wound the more drainage there will be. After 7-10 days, you will notice the wound beginning to shrink and new skin will begin to grow. The wound is healed when you can see skin has formed over the entire area. A healed wound has a healthy, shiny look to the surface and is red to dark pink in color to normalize. Wounds may take approximately 4-6 weeks to heal. Larger wounds may take 6-8 weeks. After the wound is healed you may discontinue dressing changes.   You may experience a sensation of tightness as your wound heals. This is normal and will gradually subside.   Your healed wound may be sensitive to temperature changes. This sensitivity improves with time, but if you re having a lot of discomfort, try to avoid temperature extremes.   Patients frequently experience itching after their wound appears to have healed because of the continue healing under the skin. Plain Vaseline will help relieve the itching.                 Follow-ups after your visit        Who to contact     If you have questions or need follow up information about today's clinic visit or your schedule please contact [facility] directly at [phone].  Normal or non-critical lab and imaging results will be communicated to you by [employer], letter or phone within 4 business days after the clinic has received the results. If you do not hear from us within 7 days, please contact the clinic through [employer] or phone. If you have a critical or abnormal lab result, we will notify you by phone as soon as possible.  Submit refill requests through [employer] or call your pharmacy and they will forward the refill request to us. Please allow 3 business days for your refill to be completed.          Additional Information About Your Visit        [employer] Information     [employer] gives you secure access to your electronic health record. If you see a primary care provider, you can also send messages to your  care team and make appointments. If you have questions, please call your primary care clinic.  If you do not have a primary care provider, please call [phone] and they will assist you.        Care EveryWhere ID     This is your Care EveryWhere ID. This could be used by other organizations to access your [place] medical records  [id number]        Your Vitals Were     Pulse Last Period Pulse Oximetry             76 02/25/2018 99%          Blood Pressure from Last 3 Encounters:   07/13/18 114/72   07/10/18 114/72   06/19/18 107/70    Weight from Last 3 Encounters:   05/10/18 64.9 kg (143 lb)   04/13/18 64 kg (141 lb)   03/27/18 66.2 kg (146 lb)              We Performed the Following     DESTRUCT BENIGN LESION, UP TO 14          Today's Medication Changes          These changes are accurate as of 7/13/18 10:46 AM.  If you have any questions, ask your nurse or doctor.               These medicines have changed or have updated prescriptions.        Dose/Directions    tretinoin 0.025 % topical gel   Commonly known as:  RETIN-A   This may have changed:    - how to take this  - when to take this  - additional instructions   Used for:  Acne, unspecified acne type        Apply  topically At Bedtime.   Quantity:  45 g   Refills:  12                Primary Care Provider Office Phone # Fax #    Catrina Ioana Jin [phone] [address] [facility] [address]        Equal Access to Services     ANA PRIETO AH: Hadii lakshmi Pierce, waaxda luqadaha, qaybta kaalmada qasim, thierry you. So [facility] [phone].    ATENCIÓN: Si habla español, tiene a lema disposición servicios gratuitos de asistencia lingüística. Llame al [phone].    We comply with applicable federal civil rights laws and [place] laws. We do not discriminate on the basis of race, color, national origin, age, disability, sex, sexual orientation, or gender identity.            Thank you!      Thank you for choosing [facility]  for your care. Our goal is always to provide you with excellent care. Hearing back from our patients is one way we can continue to improve our services. Please take a few minutes to complete the written survey that you may receive in the mail after your visit with us. Thank you!             Your Updated Medication List - Protect others around you: Learn how to safely use, store and throw away your medicines at www.disposemymeds.org.          This list is accurate as of 7/13/18 10:46 AM.  Always use your most recent med list.                   Brand Name Dispense Instructions for use Diagnosis    acyclovir 400 MG tablet    ZOVIRAX    180 tablet    Take 1 tablet (400 mg) by mouth 2 times daily    Herpes simplex virus (HSV) infection       cetirizine 10 MG tablet    zyrTEC     Take 10 mg by mouth daily        fluticasone 50 MCG/ACT spray    FLONASE    3 Bottle    USE TWO SPRAY(S) IN EACH NOSTRIL ONCE DAILY    Allergic rhinitis       hydrocortisone 2.5 % cream    ANUSOL-HC    28.35 g    Place rectally 2 times daily    External hemorrhoids       ibuprofen 400 MG tablet    ADVIL/MOTRIN    40 tablet    Take 1 tablet (400 mg) by mouth every 6 hours as needed for moderate pain    Postoperative state       PRILOSEC PO      Take 20 mg by mouth daily as needed        senna-docusate 8.6-50 MG per tablet    SENOKOT-S;PERICOLACE    60 tablet    Take 1 tablet by mouth 2 times daily    Postoperative state       tacrolimus 0.1 % ointment    PROTOPIC    60 g    Apply twice daily to eczema on hands as needed.    Hand eczema       tretinoin 0.025 % topical gel    RETIN-A    45 g    Apply  topically At Bedtime.    Acne, unspecified acne type

## 2018-07-13 NOTE — LETTER
2018         RE: Luna Rivera  [address]  [facility] [address]        Dear Colleague,    Thank you for referring your patient, Luna Rivera, to the [facility]. Please see a copy of my visit note below.    Luna Rivera is a 47 year old year old female patient here today for recheck wart on right plantar foot. She had cantharidin treatment earlier this week and did not see any blistering.  Patient has no other skin complaints today.  Remainder of the HPI, Meds, PMH, Allergies, FH, and SH was reviewed in chart.    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     Contact dermatitis and other eczema, due to unspecified cause      Enteritis due to [place] virus 2002     Herpes simplex without mention of complication     Last outbreak      Intramural and subserous leiomyoma of uterus 3/19/2018     Irritable bowel syndrome      PONV (postoperative nausea and vomiting)        Past Surgical History:   Procedure Laterality Date     ARTHROSCOPY KNEE WITH MEDIAL MENISCECTOMY  2014    Procedure: ARTHROSCOPY KNEE WITH MEDIAL MENISCECTOMY;  Surgeon: Alejandro Dodge MD;  Location: [facility]  DELIVERY ONLY  2002     C LIGATE FALLOPIAN TUBE,POSTPARTUM       ESOPHAGOSCOPY, GASTROSCOPY, DUODENOSCOPY (EGD), COMBINED N/A 2016    Procedure: COMBINED ESOPHAGOSCOPY, GASTROSCOPY, DUODENOSCOPY (EGD), BIOPSY SINGLE OR MULTIPLE;  Surgeon: Jose Mora MD;  Location: [facility] HYSTEROSCOPY, SURGICAL; W/ ENDOMETRIAL ABLATION, ANY METHOD  3/30/10    Novasure ablation     HYSTERECTOMY TOTAL ABDOMINAL, BILATERAL SALPINGO-OOPHORECTOMY, COMBINED Bilateral 3/27/2018    TRACI only; tubes removed; ovaries left in        Family History   Problem Relation Age of Onset     Breast Cancer Mother      age 46     Cancer Mother      Throat, larynx (d/t radiation from  breast cancer)tongue cancer 9/2011     Hypertension Father      Cancer Father      lung CA     Neurologic Disorder Maternal Grandmother      Parkinson's     C.A.D. Maternal Grandmother      Thyroid Disease Maternal Grandmother      hyperthyroid     Arthritis Maternal Grandfather      HEART DISEASE Maternal Grandfather      Osteoperosis Paternal Grandmother      HEART DISEASE Paternal Grandfather      Cancer - colorectal No family hx of      Prostate Cancer No family hx of      Cerebrovascular Disease No family hx of      Diabetes No family hx of      Asthma No family hx of        Social History     Social History     Marital status:      Spouse name: N/A     Number of children: 2     Years of education: N/A     Occupational History     [employer] at [employer]      prn      Social History Main Topics     Smoking status: Never Smoker     Smokeless tobacco: Never Used     Alcohol use Yes      Comment: rarely     Drug use: No     Sexual activity: Yes     Partners: Male     Birth control/ protection: Female Surgical      Comment: BTL     Other Topics Concern     Parent/Sibling W/ Cabg, Mi Or Angioplasty Before 65f 55m? No     Social History Narrative    Home with kids                           Outpatient Encounter Prescriptions as of 7/13/2018   Medication Sig Dispense Refill     acyclovir (ZOVIRAX) 400 MG tablet Take 1 tablet (400 mg) by mouth 2 times daily 180 tablet 3     cetirizine (ZYRTEC) 10 MG tablet Take 10 mg by mouth daily       fluticasone (FLONASE) 50 MCG/ACT spray USE TWO SPRAY(S) IN EACH NOSTRIL ONCE DAILY 3 Bottle 2     hydrocortisone (ANUSOL-HC) 2.5 % cream Place rectally 2 times daily 28.35 g 1     ibuprofen (ADVIL/MOTRIN) 400 MG tablet Take 1 tablet (400 mg) by mouth every 6 hours as needed for moderate pain 40 tablet 1     Omeprazole (PRILOSEC PO) Take 20 mg by mouth daily as needed        senna-docusate (SENOKOT-S;PERICOLACE) 8.6-50 MG per tablet Take 1 tablet by  mouth 2 times daily 60 tablet 0     tacrolimus (PROTOPIC) 0.1 % ointment Apply twice daily to eczema on hands as needed. 60 g 2     tretinoin (RETIN-A) 0.025 % topical gel Apply  topically At Bedtime. (Patient taking differently: Apply topically At Bedtime Apply  topically At Bedtime.) 45 g 12     No facility-administered encounter medications on file as of 7/13/2018.              Review Of Systems  Skin: As above  Eyes: negative  Ears/Nose/Throat: negative  Respiratory: No shortness of breath, dyspnea on exertion, cough, or hemoptysis  Cardiovascular: negative  Gastrointestinal: negative  Genitourinary: negative  Musculoskeletal: negative  Neurologic: negative  Psychiatric: negative  Hematologic/Lymphatic/Immunologic: negative  Endocrine: negative      O:   NAD, WDWN, Alert & Oriented, Mood & Affect wnl, Vitals stable   Here today alone   /72  Pulse 76  LMP 02/25/2018  SpO2 99%   General appearance normal   Vitals stable   Alert, oriented and in no acute distress      Scab on right plantar foot       Eyes: Conjunctivae/lids:Normal     ENT: Lips: normal    MSK:Normal    Pulm: Breathing Normal    Neuro/Psych: Orientation:Normal; Mood/Affect:Normal  A/P:  1. Plantar wart on right foot   LN2:  Treated with LN2 for 5s for 1-2 cycles. Warned risks of blistering, pain, pigment change, scarring, and incomplete resolution.  Advised patient to return if lesions do not completely resolve.  Wound care sheet given.  If not resolved next visit will shave off.     Again, thank you for allowing me to participate in the care of your patient.        Sincerely,        Luna Roldan PA-C

## 2018-07-13 NOTE — NURSING NOTE
"Initial /72  Pulse 76  LMP 02/25/2018  SpO2 99% Estimated body mass index is 21.74 kg/(m^2) as calculated from the following:    Height as of 5/10/18: 1.727 m (5' 8\").    Weight as of 5/10/18: 64.9 kg (143 lb). .      "

## 2018-07-16 ENCOUNTER — TELEPHONE (OUTPATIENT)
Dept: DERMATOLOGY | Facility: CLINIC | Age: 48
End: 2018-07-16

## 2018-07-16 NOTE — TELEPHONE ENCOUNTER
PA Initiation    Medication: Tacrolimus  Insurance Company: [employer] [place] - Phone [phone] Fax [phone]  Pharmacy Filling the Rx: [facility] [address] [place] [address] - [facility] [address]  Filling Pharmacy Phone: [phone]  Filling Pharmacy Fax:    Start Date: 7/16/2018    Central Prior Authorization Team   Phone: [phone]

## 2018-07-16 NOTE — TELEPHONE ENCOUNTER
Prior Authorization Retail Medication Request    Medication/Dose: Tacrolimus 0.1% oint  ICD code (if different than what is on RX):    Previously Tried and Failed:    Rationale:      Insurance Name:  SAUL  Insurance ID:        Pharmacy Information (if different than what is on RX)  Name:  [facility] [place]  Phone:

## 2018-07-16 NOTE — LETTER
To whom it may concern,    I writing this letter on behalf of Luna Rivera to appeal denial for tacrolimus. 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 Patient has used diprolene ointment for numerous years which has helped but she doesn't want to continue use since it can cause thinning of the skin. She reports that she started protopic about 4 years ago which works get for her eczema on her hands without causing thinning of the skin. Please consider covering tacrolimus to treat patient chronic hand eczema.     Sincerely,   Luna Tijerina PA-C

## 2018-07-18 NOTE — TELEPHONE ENCOUNTER
Called Patient s insurance at [phone] and per representative they did receive the request and it is still currently under review. Their turnaround time is 4-5 calendar days but can take up to 10 days.

## 2018-07-23 NOTE — TELEPHONE ENCOUNTER
PRIOR AUTHORIZATION DENIED    Medication: Tacrolimus denied     Denial Date: 7/20/2018    Denial Rational: patient has to try/fail topical corticosteroid or topical corticosteroid combination product within the past 120 days:        Appeal Information: Patient needs to initiate appeal by calling the number on the back of their card or writing to the address below:

## 2018-07-24 NOTE — TELEPHONE ENCOUNTER
Maria L Osuna   [facility] Prior Auth Yesterday (12:31 PM)                   Pa was denied.  Patient needs to initiate appeal by calling the number on the back of their card or writing to the address listed in the appeal area of the denial reasoning.  Please close encounter when finished thank you.  (Routing comment)

## 2018-07-25 NOTE — TELEPHONE ENCOUNTER
Please advise:    Please see appeal information that was provided in the telephone note of the denial.  To start the appeal, patient must initiate the appeal with a signed authorization or a verbal authorization by calling member services on the back of her insurance card.  [employer] will not start the appeal without her initiation.  The easiest way for patient to do this is by calling [facility] through the member services number on her card.      [facility] then sends us the required information after the patient initiates the appeal and the [facility] team can take it from there with letter of medical necessity that was written already and clinical notes. We will have to wait to hear from insurance.     We have tried to initiate for patients in the past and we are not able to do so.  Please have patient call her insurance to initiate appeal.

## 2018-07-26 NOTE — TELEPHONE ENCOUNTER
Message left to return call.     Per [facility] PA pool, patient needs to call her insurance company herself to initiate appeal,even though Provider has written appeal letter, see notes below.    (I did leave patient a message to call us yesterday)    Mary Beth Jj RN

## 2018-08-02 ENCOUNTER — OFFICE VISIT (OUTPATIENT)
Dept: FAMILY MEDICINE | Facility: CLINIC | Age: 48
End: 2018-08-02
Payer: COMMERCIAL

## 2018-08-02 VITALS
HEIGHT: 68 IN | BODY MASS INDEX: 22.02 KG/M2 | SYSTOLIC BLOOD PRESSURE: 122 MMHG | HEART RATE: 88 BPM | OXYGEN SATURATION: 99 % | WEIGHT: 145.3 LBS | RESPIRATION RATE: 16 BRPM | TEMPERATURE: 98.1 F | DIASTOLIC BLOOD PRESSURE: 68 MMHG

## 2018-08-02 DIAGNOSIS — K21.9 GASTROESOPHAGEAL REFLUX DISEASE WITHOUT ESOPHAGITIS: Primary | ICD-10-CM

## 2018-08-02 DIAGNOSIS — R10.11 RUQ ABDOMINAL PAIN: ICD-10-CM

## 2018-08-02 PROCEDURE — 99214 OFFICE O/P EST MOD 30 MIN: CPT | Performed by: NURSE PRACTITIONER

## 2018-08-02 RX ORDER — SUCRALFATE ORAL 1 G/10ML
1 SUSPENSION ORAL 4 TIMES DAILY
Qty: 420 ML | Refills: 1 | Status: SHIPPED | OUTPATIENT
Start: 2018-08-02 | End: 2018-12-18

## 2018-08-02 ASSESSMENT — PAIN SCALES - GENERAL: PAINLEVEL: MILD PAIN (2)

## 2018-08-02 NOTE — NURSING NOTE
Chief Complaint   Patient presents with     Bloated     Patient is having upper abdominal bloating, feels full, worse with eating. Having softer bowel movements.     Liz MUÑOZ CMA

## 2018-08-02 NOTE — PROGRESS NOTES
"  SUBJECTIVE:   Luna Rivera is a 47 year old female who presents to clinic today for the following health issues:      Gastrointestinal symptoms      Duration: Has been ongoing for the past two weeks    Description:           Patient has noticed a full feeling, especially after eating, in the upper abdominal area.    Intensity:  moderate    Accompanying signs and symptoms:  nausea, vomitting, loose stools, constipation and bloating    History  Previous similar problem: no   Previous evaluation:  none    Aggravating factors: meals and caffeine - cutting back on this.    Alleviating factors: avoidance of caffeine    Other Therapies tried: None    Was drinking a lot of mineral water and has stopped this.  This has helped some.  Reports epigastric and RUQ pain - \"pressure\"  Worse after eating.    History of hysterectomy in March 2018.    No nausea/vomiting   No fever/chills    US RUQ done in 2015 -   US ABDOMEN LIMITED 5/8/2015 2:30 PM     HISTORY: Right upper quadrant pain.     COMPARISON: None.     FINDINGS:  Gallbladder: Normal.     Common bile duct: Normal.     Liver: Normal.     Pancreas: Part of the pancreatic head is not visualized due to  overlying bowel gas. Remainder of the pancreas appears normal.     Right kidney: Normal.     Inferior vena cava appears normal.     IMPRESSION  IMPRESSION:   1. Incomplete visualization of the pancreatic head.  2. Otherwise, normal right upper quadrant ultrasound.    Upper GI done 2016 showed:  Impression:          - Normal nasopharynx and oropharynx.                        - Benign-appearing esophageal stricture.                        - Esophageal polyp(s) were found. Biopsied.                        - Gastritis.                        - Normal examined duodenum.    Problem list and histories reviewed & adjusted, as indicated.  Additional history: as documented    Patient Active Problem List   Diagnosis     Allergic rhinitis     Hemorrhoids     Family history of breast " cancer in mother     CARDIOVASCULAR SCREENING; LDL GOAL LESS THAN 160     Acne     Hip impingement syndrome, unspecified laterality     Gastroesophageal reflux disease without esophagitis     Ptosis of eyelid, bilateral     Situational anxiety     Grief reaction     S/P TRACI (total abdominal hysterectomy)     Past Surgical History:   Procedure Laterality Date     ARTHROSCOPY KNEE WITH MEDIAL MENISCECTOMY  2014    Procedure: ARTHROSCOPY KNEE WITH MEDIAL MENISCECTOMY;  Surgeon: Alejandro Dodge MD;  Location: [facility]       DELIVERY ONLY  ,      C LIGATE FALLOPIAN TUBE,POSTPARTUM       ESOPHAGOSCOPY, GASTROSCOPY, DUODENOSCOPY (EGD), COMBINED N/A 2016    Procedure: COMBINED ESOPHAGOSCOPY, GASTROSCOPY, DUODENOSCOPY (EGD), BIOPSY SINGLE OR MULTIPLE;  Surgeon: Jose Mora MD;  Location: [facility]      HYSTEROSCOPY, SURGICAL; W/ ENDOMETRIAL ABLATION, ANY METHOD  3/30/10    Novasure ablation     HYSTERECTOMY TOTAL ABDOMINAL, BILATERAL SALPINGO-OOPHORECTOMY, COMBINED Bilateral 3/27/2018    TRACI only; tubes removed; ovaries left in       Social History   Substance Use Topics     Smoking status: Never Smoker     Smokeless tobacco: Never Used     Alcohol use Yes      Comment: rarely     Family History   Problem Relation Age of Onset     Breast Cancer Mother      age 46     Cancer Mother      Throat, larynx (d/t radiation from breast cancer)tongue cancer 2011     Hypertension Father      Cancer Father      lung CA     Neurologic Disorder Maternal Grandmother      Parkinson's     C.A.D. Maternal Grandmother      Thyroid Disease Maternal Grandmother      hyperthyroid     Arthritis Maternal Grandfather      HEART DISEASE Maternal Grandfather      Osteoperosis Paternal Grandmother      HEART DISEASE Paternal Grandfather      Cancer - colorectal No family hx of      Prostate Cancer No family hx of      Cerebrovascular Disease No family hx of      Diabetes No family hx of      Asthma No family hx of           Current Outpatient Prescriptions   Medication Sig Dispense Refill     acyclovir (ZOVIRAX) 400 MG tablet Take 1 tablet (400 mg) by mouth 2 times daily 180 tablet 3     augmented betamethasone dipropionate (DIPROLENE-AF) 0.05 % ointment Apply twice daily as needed. 50 g 2     cetirizine (ZYRTEC) 10 MG tablet Take 10 mg by mouth daily       fluticasone (FLONASE) 50 MCG/ACT spray USE TWO SPRAY(S) IN EACH NOSTRIL ONCE DAILY 3 Bottle 2     hydrocortisone (ANUSOL-HC) 2.5 % cream Place rectally 2 times daily 28.35 g 1     Omeprazole (PRILOSEC PO) Take 20 mg by mouth daily as needed        tacrolimus (PROTOPIC) 0.1 % ointment Apply twice daily to eczema on hands as needed. 60 g 2     tretinoin (RETIN-A) 0.025 % topical gel Apply  topically At Bedtime. (Patient taking differently: Apply topically At Bedtime Apply  topically At Bedtime.) 45 g 12     ibuprofen (ADVIL/MOTRIN) 400 MG tablet Take 1 tablet (400 mg) by mouth every 6 hours as needed for moderate pain (Patient not taking: Reported on 8/2/2018) 40 tablet 1     senna-docusate (SENOKOT-S;PERICOLACE) 8.6-50 MG per tablet Take 1 tablet by mouth 2 times daily (Patient not taking: Reported on 8/2/2018) 60 tablet 0     Allergies   Allergen Reactions     Compazine Fatigue     Neurological s/s-can't wake up     Ancef [Cefazolin Sodium] Hives     Recent Labs   Lab Test  03/28/18   0557  03/06/18   1910  10/24/16   1214  05/06/15   0858   05/22/14   1056  02/27/13   0945  01/04/11   1003   LDL   --    --    --    --    --   92  134*  114   HDL   --    --    --    --    --   69  63  59   TRIG   --    --    --    --    --   72  63  91   ALT   --    --    --   18   --    --    --    --    CR  0.79  0.84  0.84  0.84   < >   --    --    --    GFRESTIMATED  78  72  73  73   < >   --    --    --    GFRESTBLACK  >90  87  88  88   < >   --    --    --    POTASSIUM  3.7  3.4  4.1  3.9   < >   --    --    --    TSH   --    --   1.71   --    --   2.09   --    --     < > = values in  "this interval not displayed.      BP Readings from Last 3 Encounters:   08/02/18 122/68   07/13/18 114/72   07/10/18 114/72    Wt Readings from Last 3 Encounters:   08/02/18 145 lb 4.8 oz (65.9 kg)   05/10/18 143 lb (64.9 kg)   04/13/18 141 lb (64 kg)                  Labs reviewed in EPIC    Reviewed and updated as needed this visit by clinical staff  Tobacco  Allergies  Meds  Med Hx  Surg Hx  Fam Hx  Soc Hx      Reviewed and updated as needed this visit by Provider         ROS:  Constitutional, HEENT, cardiovascular, pulmonary, GI, , musculoskeletal, neuro, skin, endocrine and psych systems are negative, except as otherwise noted.    OBJECTIVE:     /68  Pulse 88  Temp 98.1  F (36.7  C) (Tympanic)  Resp 16  Ht 5' 8\" (1.727 m)  Wt 145 lb 4.8 oz (65.9 kg)  LMP 02/25/2018  SpO2 99%  BMI 22.09 kg/m2  Body mass index is 22.09 kg/(m^2).  GENERAL: healthy, alert and no distress  NECK: no adenopathy, no asymmetry, masses, or scars and thyroid normal to palpation  RESP: lungs clear to auscultation - no rales, rhonchi or wheezes  CV: regular rate and rhythm, normal S1 S2, no S3 or S4, no murmur, click or rub, no peripheral edema and peripheral pulses strong  ABDOMEN: tenderness epigastric and RUQ - mild tenderness on palpation - no rebound pain or guarding, no organomegaly or masses, liver span normal to percussion and bowel sounds normal  MS: no gross musculoskeletal defects noted, no edema    Diagnostic Test Results:  none     ASSESSMENT/PLAN:     1. Gastroesophageal reflux disease without esophagitis     - sucralfate (CARAFATE) 1 GM/10ML suspension; Take 10 mLs (1 g) by mouth 4 times daily  Dispense: 420 mL; Refill: 1  - omeprazole (PRILOSEC) 20 MG CR capsule; Take 1 capsule (20 mg) by mouth 2 times daily  Dispense: 60 capsule; Refill: 1  - GASTROENTEROLOGY ADULT REF PROCEDURE ONLY [facility] [phone]; [place] General Surgeon    2. RUQ abdominal pain   Uncertain cause - differential could " include - gas/bloating, GERD, gastritis (previously seen on UGI), PUD, etc..  The risks, benefits and treatment options of prescribed medications or other treatments have been discussed with the patient. The patient verbalized their understanding and should call or follow up if no improvement or if they develop further problems.    - sucralfate (CARAFATE) 1 GM/10ML suspension; Take 10 mLs (1 g) by mouth 4 times daily  Dispense: 420 mL; Refill: 1  - omeprazole (PRILOSEC) 20 MG CR capsule; Take 1 capsule (20 mg) by mouth 2 times daily  Dispense: 60 capsule; Refill: 1  - GASTROENTEROLOGY ADULT REF PROCEDURE ONLY [facility] [phone]; [place] General Surgeon    Patient Instructions   1. Avoid eating within 3-4 hours of bedtime.    2. Eat frequent small meals per day rather than large meals.    3. Avoid tobacco and alcohol products, avoid tight fitting clothes, elevate head of bed with six inch blocks.  4. Take antacids, like TUMS, for occasional heart burn.    5. Avoid NSAIDS.  6. If overweight, weight loss is recommended. Losing even as little as 10 lbs may decrease symptoms.  7. Avoid high fat meals and other foods that aggravate the problem. Foods that may cause more symptoms are: chocolate, tomato-based foods, alcohol, peppermint, caffeinated products, citrus fruits and drinks, onions and garlic.    Prescription Carafate four times daily for the next 7-10 days.    Omeprazole 20 mg twice daily for 1 month - then can cut back and take 20 mg once daily for a few weeks then as needed after that.    IF symptoms not improving after 2-3 weeks on above medication - message me and would plan for Upper Endoscopy.    COURTNEY Curiel, CATHY  [facility]

## 2018-08-02 NOTE — PATIENT INSTRUCTIONS
1. Avoid eating within 3-4 hours of bedtime.    2. Eat frequent small meals per day rather than large meals.    3. Avoid tobacco and alcohol products, avoid tight fitting clothes, elevate head of bed with six inch blocks.  4. Take antacids, like TUMS, for occasional heart burn.    5. Avoid NSAIDS.  6. If overweight, weight loss is recommended. Losing even as little as 10 lbs may decrease symptoms.  7. Avoid high fat meals and other foods that aggravate the problem. Foods that may cause more symptoms are: chocolate, tomato-based foods, alcohol, peppermint, caffeinated products, citrus fruits and drinks, onions and garlic.    Prescription Carafate four times daily for the next 7-10 days.    Omeprazole 20 mg twice daily for 1 month - then can cut back and take 20 mg once daily for a few weeks then as needed after that.    IF symptoms not improving after 2-3 weeks on above medication - message me and would plan for Upper Endoscopy.    CUORTNEY Curiel

## 2018-08-02 NOTE — MR AVS SNAPSHOT
After Visit Summary   8/2/2018    Luna Rivera    MRN: [id number]           Patient Information     Date Of Birth          1970        Visit Information        Provider Department      8/2/2018 2:00 PM Catrina Jin NP [facility]        Today's Diagnoses     Gastroesophageal reflux disease without esophagitis    -  1    RUQ abdominal pain          Care Instructions    1. Avoid eating within 3-4 hours of bedtime.    2. Eat frequent small meals per day rather than large meals.    3. Avoid tobacco and alcohol products, avoid tight fitting clothes, elevate head of bed with six inch blocks.  4. Take antacids, like TUMS, for occasional heart burn.    5. Avoid NSAIDS.  6. If overweight, weight loss is recommended. Losing even as little as 10 lbs may decrease symptoms.  7. Avoid high fat meals and other foods that aggravate the problem. Foods that may cause more symptoms are: chocolate, tomato-based foods, alcohol, peppermint, caffeinated products, citrus fruits and drinks, onions and garlic.    Prescription Carafate four times daily for the next 7-10 days.    Omeprazole 20 mg twice daily for 1 month - then can cut back and take 20 mg once daily for a few weeks then as needed after that.    IF symptoms not improving after 2-3 weeks on above medication - message me and would plan for Upper Endoscopy.    COURTNEY Curiel            Follow-ups after your visit        Additional Services     GASTROENTEROLOGY ADULT REF PROCEDURE ONLY [facility] [phone]; [place] General Surgeon       Last Lab Result: Creatinine (mg/dL)       Date                     Value                 03/28/2018               0.79             ----------  Body mass index is 22.09 kg/(m^2).      Patient will be contacted to schedule procedure.     Please be aware that coverage of these services is subject to the terms and limitations of your health insurance plan.  Call member services at your health  plan with any benefit or coverage questions.  Any procedures must be performed at a [place] facility OR coordinated by your clinic's referral office.    Please bring the following with you to your appointment:    (1) Any X-Rays, CTs or MRIs which have been performed.  Contact the facility where they were done to arrange for  prior to your scheduled appointment.    (2) List of current medications   (3) This referral request   (4) Any documents/labs given to you for this referral                  Your next 10 appointments already scheduled     Aug 08, 2018 10:00 AM CDT   Return Visit with Luna Tijerina PA-C   [facility]    [address] [facility] [address] [phone]              Who to contact     If you have questions or need follow up information about today's clinic visit or your schedule please contact [facility] directly at [phone].  Normal or non-critical lab and imaging results will be communicated to you by MyChart, letter or phone within 4 business days after the clinic has received the results. If you do not hear from us within 7 days, please contact the clinic through [facility] or phone. If you have a critical or abnormal lab result, we will notify you by phone as soon as possible.  Submit refill requests through [facility] or call your pharmacy and they will forward the refill request to us. Please allow 3 business days for your refill to be completed.          Additional Information About Your Visit        [facility] Information     [employer] gives you secure access to your electronic health record. If you see a primary care provider, you can also send messages to your care team and make appointments. If you have questions, please call your primary care clinic.  If you do not have a primary care provider, please call [phone] and they will assist you.        Care EveryWhere ID     This is your Care EveryWhere ID. This  "could be used by other organizations to access your [place] medical records  [id number]        Your Vitals Were     Pulse Temperature Respirations Height Last Period Pulse Oximetry    88 98.1  F (36.7  C) (Tympanic) 16 5' 8\" (1.727 m) 02/25/2018 99%    BMI (Body Mass Index)                   22.09 kg/m2            Blood Pressure from Last 3 Encounters:   08/02/18 122/68   07/13/18 114/72   07/10/18 114/72    Weight from Last 3 Encounters:   08/02/18 145 lb 4.8 oz (65.9 kg)   05/10/18 143 lb (64.9 kg)   04/13/18 141 lb (64 kg)              We Performed the Following     GASTROENTEROLOGY ADULT REF PROCEDURE ONLY [facility] [phone]; [place] General Surgeon          Today's Medication Changes          These changes are accurate as of 8/2/18  2:40 PM.  If you have any questions, ask your nurse or doctor.               Start taking these medicines.        Dose/Directions    omeprazole 20 MG CR capsule   Commonly known as:  priLOSEC   Used for:  Gastroesophageal reflux disease without esophagitis, RUQ abdominal pain   Started by:  Catrina Jin NP        Dose:  20 mg   Take 1 capsule (20 mg) by mouth 2 times daily   Quantity:  60 capsule   Refills:  1       sucralfate 1 GM/10ML suspension   Commonly known as:  CARAFATE   Used for:  RUQ abdominal pain, Gastroesophageal reflux disease without esophagitis   Started by:  Catrina Jin NP        Dose:  1 g   Take 10 mLs (1 g) by mouth 4 times daily   Quantity:  420 mL   Refills:  1         These medicines have changed or have updated prescriptions.        Dose/Directions    tretinoin 0.025 % topical gel   Commonly known as:  RETIN-A   This may have changed:    - how to take this  - when to take this  - additional instructions   Used for:  Acne, unspecified acne type        Apply  topically At Bedtime.   Quantity:  45 g   Refills:  12            Where to get your medicines      These medications were sent to [facility] [place], " [place] - [address]  [address] [facility] [address]     Phone:  [phone]     omeprazole 20 MG CR capsule    sucralfate 1 GM/10ML suspension                Primary Care Provider Office Phone # Fax #    Catrina Jin [phone] [address] [facility] [address]        Equal Access to Services     ANA PRIETO : Hadii aad ku hadasho Soomaali, waaxda luqadaha, qaybta kaalmada adeegyada, waxay idiin hayaan adeeg kharash la'lestern ah. So [facility] [phone].    ATENCIÓN: Si habla loren, tiene a lema disposición servicios gratuitos de asistencia lingüística. Llame al [phone].    We comply with applicable federal civil rights laws and [place] laws. We do not discriminate on the basis of race, color, national origin, age, disability, sex, sexual orientation, or gender identity.            Thank you!     Thank you for choosing [facility]  for your care. Our goal is always to provide you with excellent care. Hearing back from our patients is one way we can continue to improve our services. Please take a few minutes to complete the written survey that you may receive in the mail after your visit with us. Thank you!             Your Updated Medication List - Protect others around you: Learn how to safely use, store and throw away your medicines at www.disposemymeds.org.          This list is accurate as of 8/2/18  2:40 PM.  Always use your most recent med list.                   Brand Name Dispense Instructions for use Diagnosis    acyclovir 400 MG tablet    ZOVIRAX    180 tablet    Take 1 tablet (400 mg) by mouth 2 times daily    Herpes simplex virus (HSV) infection       augmented betamethasone dipropionate 0.05 % ointment    DIPROLENE-AF    50 g    Apply twice daily as needed.    Chronic dermatitis of hands       cetirizine 10 MG tablet    zyrTEC     Take 10 mg by mouth daily        fluticasone 50 MCG/ACT spray    FLONASE    3 Bottle    USE TWO SPRAY(S) IN EACH  NOSTRIL ONCE DAILY    Allergic rhinitis       hydrocortisone 2.5 % cream    ANUSOL-HC    28.35 g    Place rectally 2 times daily    External hemorrhoids       ibuprofen 400 MG tablet    ADVIL/MOTRIN    40 tablet    Take 1 tablet (400 mg) by mouth every 6 hours as needed for moderate pain    Postoperative state       omeprazole 20 MG CR capsule    priLOSEC    60 capsule    Take 1 capsule (20 mg) by mouth 2 times daily    Gastroesophageal reflux disease without esophagitis, RUQ abdominal pain       PRILOSEC PO      Take 20 mg by mouth daily as needed        senna-docusate 8.6-50 MG per tablet    SENOKOT-S;PERICOLACE    60 tablet    Take 1 tablet by mouth 2 times daily    Postoperative state       sucralfate 1 GM/10ML suspension    CARAFATE    420 mL    Take 10 mLs (1 g) by mouth 4 times daily    RUQ abdominal pain, Gastroesophageal reflux disease without esophagitis       tacrolimus 0.1 % ointment    PROTOPIC    60 g    Apply twice daily to eczema on hands as needed.    Hand eczema       tretinoin 0.025 % topical gel    RETIN-A    45 g    Apply  topically At Bedtime.    Acne, unspecified acne type

## 2018-08-03 ENCOUNTER — TELEPHONE (OUTPATIENT)
Dept: FAMILY MEDICINE | Facility: CLINIC | Age: 48
End: 2018-08-03

## 2018-08-03 NOTE — TELEPHONE ENCOUNTER
Reason for Call:  FYI ONLY    Procedure  has reached out to patient to  upper gi endsocopy    Detailed comments: patient states that she is trying medication first and if meds do not control symptoms she will call to schedule    No further action necessary for schedulers  Phone Number Patient can be reached at: Home number on file [phone] (home)    Best Time: NA    Can we leave a detailed message on this number? Not Applicable    Call taken on 8/3/2018 at 9:04 AM by Aliyah Mccollum

## 2018-08-06 ENCOUNTER — TELEPHONE (OUTPATIENT)
Dept: FAMILY MEDICINE | Facility: CLINIC | Age: 48
End: 2018-08-06

## 2018-08-07 NOTE — TELEPHONE ENCOUNTER
PA submitted to [facility] POOL 8/6/18 by GILMA Urias    Prior Authorization Retail Medication Request    Medication/Dose: omeprazole  ICD code (if different than what is on RX):    Previously Tried and Failed:  Carafate; zantac; robinul; prilosec 20 and 40 mg  Rationale:  Patient has used since 2008 off and on to treat symptoms    Insurance Name:  BC/  Insurance ID:  [id number]      Pharmacy Information (if different than what is on RX)  Name:    Phone:

## 2018-08-07 NOTE — TELEPHONE ENCOUNTER
PA Initiation    Medication: omeprazole  Insurance Company: [employer] [place] - Phone [phone] Fax [phone]  Pharmacy Filling the Rx: [facility] [address] [place] [address] - [facility] [address]   Filling Pharmacy Phone: [phone]  Filling Pharmacy Fax:    Start Date: 8/7/2018    Central Prior Authorization Team   Phone: [phone]

## 2018-08-08 ENCOUNTER — OFFICE VISIT (OUTPATIENT)
Dept: DERMATOLOGY | Facility: CLINIC | Age: 48
End: 2018-08-08
Payer: COMMERCIAL

## 2018-08-08 VITALS — DIASTOLIC BLOOD PRESSURE: 75 MMHG | OXYGEN SATURATION: 99 % | SYSTOLIC BLOOD PRESSURE: 131 MMHG | HEART RATE: 99 BPM

## 2018-08-08 DIAGNOSIS — B07.0 PLANTAR WART: Primary | ICD-10-CM

## 2018-08-08 PROCEDURE — 17110 DESTRUCTION B9 LES UP TO 14: CPT | Performed by: PHYSICIAN ASSISTANT

## 2018-08-08 NOTE — LETTER
2018         RE: Luna Rivera  [address]  [facility] [address]        Dear Colleague,    Thank you for referring your patient, Luna Rivera, to the [facility]. Please see a copy of my visit note below.    Luna Rivera is a 47 year old year old female patient here today for recheck wart on foot.  Patient reports that she believes wart is still present. Has had two treatment with cantharidin and one with cryo.  Patient has no other skin complaints today.  Remainder of the HPI, Meds, PMH, Allergies, FH, and SH was reviewed in chart.    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     Contact dermatitis and other eczema, due to unspecified cause      Enteritis due to [place] virus 2002     Herpes simplex without mention of complication     Last outbreak      Intramural and subserous leiomyoma of uterus 3/19/2018     Irritable bowel syndrome      PONV (postoperative nausea and vomiting)        Past Surgical History:   Procedure Laterality Date     ARTHROSCOPY KNEE WITH MEDIAL MENISCECTOMY  2014    Procedure: ARTHROSCOPY KNEE WITH MEDIAL MENISCECTOMY;  Surgeon: Alejandro Dodge MD;  Location: [facility]  DELIVERY ONLY  2002     C LIGATE FALLOPIAN TUBE,POSTPARTUM       ESOPHAGOSCOPY, GASTROSCOPY, DUODENOSCOPY (EGD), COMBINED N/A 2016    Procedure: COMBINED ESOPHAGOSCOPY, GASTROSCOPY, DUODENOSCOPY (EGD), BIOPSY SINGLE OR MULTIPLE;  Surgeon: Jose Mora MD;  Location: [facility]      HYSTEROSCOPY, SURGICAL; W/ ENDOMETRIAL ABLATION, ANY METHOD  3/30/10    Novasure ablation     HYSTERECTOMY TOTAL ABDOMINAL, BILATERAL SALPINGO-OOPHORECTOMY, COMBINED Bilateral 3/27/2018    TRACI only; tubes removed; ovaries left in        Family History   Problem Relation Age of Onset     Breast Cancer Mother      age 46     Cancer Mother      Throat, larynx (d/t  radiation from breast cancer)tongue cancer 9/2011     Hypertension Father      Cancer Father      lung CA     Neurologic Disorder Maternal Grandmother      Parkinson's     C.A.D. Maternal Grandmother      Thyroid Disease Maternal Grandmother      hyperthyroid     Arthritis Maternal Grandfather      HEART DISEASE Maternal Grandfather      Osteoperosis Paternal Grandmother      HEART DISEASE Paternal Grandfather      Cancer - colorectal No family hx of      Prostate Cancer No family hx of      Cerebrovascular Disease No family hx of      Diabetes No family hx of      Asthma No family hx of        Social History     Social History     Marital status:      Spouse name: N/A     Number of children: 2     Years of education: N/A     Occupational History     Stay at Home Mom [employer]      Social History Main Topics     Smoking status: Never Smoker     Smokeless tobacco: Never Used     Alcohol use Yes      Comment: rarely     Drug use: No     Sexual activity: Yes     Partners: Male     Birth control/ protection: Female Surgical      Comment: BTL     Other Topics Concern     Parent/Sibling W/ Cabg, Mi Or Angioplasty Before 65f 55m? No     Social History Narrative    Home with kids                           Outpatient Encounter Prescriptions as of 8/8/2018   Medication Sig Dispense Refill     acyclovir (ZOVIRAX) 400 MG tablet Take 1 tablet (400 mg) by mouth 2 times daily 180 tablet 3     augmented betamethasone dipropionate (DIPROLENE-AF) 0.05 % ointment Apply twice daily as needed. 50 g 2     cetirizine (ZYRTEC) 10 MG tablet Take 10 mg by mouth daily       fluticasone (FLONASE) 50 MCG/ACT spray USE TWO SPRAY(S) IN EACH NOSTRIL ONCE DAILY 3 Bottle 2     hydrocortisone (ANUSOL-HC) 2.5 % cream Place rectally 2 times daily 28.35 g 1     Omeprazole (PRILOSEC PO) Take 20 mg by mouth daily as needed        omeprazole (PRILOSEC) 20 MG CR capsule Take 1 capsule (20 mg) by mouth 2 times daily  60 capsule 1     senna-docusate (SENOKOT-S;PERICOLACE) 8.6-50 MG per tablet Take 1 tablet by mouth 2 times daily 60 tablet 0     sucralfate (CARAFATE) 1 GM/10ML suspension Take 10 mLs (1 g) by mouth 4 times daily 420 mL 1     tacrolimus (PROTOPIC) 0.1 % ointment Apply twice daily to eczema on hands as needed. 60 g 2     tretinoin (RETIN-A) 0.025 % topical gel Apply  topically At Bedtime. (Patient taking differently: Apply topically At Bedtime Apply  topically At Bedtime.) 45 g 12     ibuprofen (ADVIL/MOTRIN) 400 MG tablet Take 1 tablet (400 mg) by mouth every 6 hours as needed for moderate pain (Patient not taking: Reported on 8/2/2018) 40 tablet 1     No facility-administered encounter medications on file as of 8/8/2018.              Review Of Systems  Skin: As above  Eyes: negative  Ears/Nose/Throat: negative  Respiratory: No shortness of breath, dyspnea on exertion, cough, or hemoptysis  Cardiovascular: negative  Gastrointestinal: negative  Genitourinary: negative  Musculoskeletal: negative  Neurologic: negative  Psychiatric: negative  Hematologic/Lymphatic/Immunologic: negative  Endocrine: negative      O:   NAD, WDWN, Alert & Oriented, Mood & Affect wnl, Vitals stable   Here today alone   /75  Pulse 99  LMP 02/25/2018  SpO2 99%   General appearance normal   Vitals stable   Alert, oriented and in no acute distress     Small thin verrucous papule on plantar foot       Eyes: Conjunctivae/lids:Normal     ENT: Lips: normal    MSK:Normal    Pulm: Breathing Normal    Neuro/Psych: Orientation:Normal; Mood/Affect:Normal  A/P:  1. Plantar wart x 1 on right foot (tender to touch)  LN2:  Treated with LN2 for 5s for 1-2 cycles. Warned risks of blistering, pain, pigment change, scarring, and incomplete resolution.  Advised patient to return if lesions do not completely resolve.  Wound care sheet given.      Again, thank you for allowing me to participate in the care of your patient.        Sincerely,        Luna  Krystle Roldan PA-C

## 2018-08-08 NOTE — NURSING NOTE
"Initial /75  Pulse 99  LMP 02/25/2018  SpO2 99% Estimated body mass index is 22.09 kg/(m^2) as calculated from the following:    Height as of 8/2/18: 1.727 m (5' 8\").    Weight as of 8/2/18: 65.9 kg (145 lb 4.8 oz). .      "

## 2018-08-08 NOTE — PROGRESS NOTES
Luna Rivera is a 47 year old year old female patient here today for recheck wart on foot.  Patient reports that she believes wart is still present. Has had two treatment with cantharidin and one with cryo.  Patient has no other skin complaints today.  Remainder of the HPI, Meds, PMH, Allergies, FH, and SH was reviewed in chart.    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     Contact dermatitis and other eczema, due to unspecified cause      Enteritis due to [place] virus 2002     Herpes simplex without mention of complication     Last outbreak      Intramural and subserous leiomyoma of uterus 3/19/2018     Irritable bowel syndrome      PONV (postoperative nausea and vomiting)        Past Surgical History:   Procedure Laterality Date     ARTHROSCOPY KNEE WITH MEDIAL MENISCECTOMY  2014    Procedure: ARTHROSCOPY KNEE WITH MEDIAL MENISCECTOMY;  Surgeon: Alejandro Dodge MD;  Location: [facility]  DELIVERY ONLY  2002     C LIGATE FALLOPIAN TUBE,POSTPARTUM       ESOPHAGOSCOPY, GASTROSCOPY, DUODENOSCOPY (EGD), COMBINED N/A 2016    Procedure: COMBINED ESOPHAGOSCOPY, GASTROSCOPY, DUODENOSCOPY (EGD), BIOPSY SINGLE OR MULTIPLE;  Surgeon: Jose Mora MD;  Location: [facility]      HYSTEROSCOPY, SURGICAL; W/ ENDOMETRIAL ABLATION, ANY METHOD  3/30/10    Novasure ablation     HYSTERECTOMY TOTAL ABDOMINAL, BILATERAL SALPINGO-OOPHORECTOMY, COMBINED Bilateral 3/27/2018    TRACI only; tubes removed; ovaries left in        Family History   Problem Relation Age of Onset     Breast Cancer Mother      age 46     Cancer Mother      Throat, larynx (d/t radiation from breast cancer)tongue cancer 2011     Hypertension Father      Cancer Father      lung CA     Neurologic Disorder Maternal Grandmother      Parkinson's     C.A.D. 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 No     Social History Narrative    Home with kids                           Outpatient Encounter Prescriptions as of 8/8/2018   Medication Sig Dispense Refill     acyclovir (ZOVIRAX) 400 MG tablet Take 1 tablet (400 mg) by mouth 2 times daily 180 tablet 3     augmented betamethasone dipropionate (DIPROLENE-AF) 0.05 % ointment Apply twice daily as needed. 50 g 2     cetirizine (ZYRTEC) 10 MG tablet Take 10 mg by mouth daily       fluticasone (FLONASE) 50 MCG/ACT spray USE TWO SPRAY(S) IN EACH NOSTRIL ONCE DAILY 3 Bottle 2     hydrocortisone (ANUSOL-HC) 2.5 % cream Place rectally 2 times daily 28.35 g 1     Omeprazole (PRILOSEC PO) Take 20 mg by mouth daily as needed        omeprazole (PRILOSEC) 20 MG CR capsule Take 1 capsule (20 mg) by mouth 2 times daily 60 capsule 1     senna-docusate (SENOKOT-S;PERICOLACE) 8.6-50 MG per tablet Take 1 tablet by mouth 2 times daily 60 tablet 0     sucralfate (CARAFATE) 1 GM/10ML suspension Take 10 mLs (1 g) by mouth 4 times daily 420 mL 1     tacrolimus (PROTOPIC)  0.1 % ointment Apply twice daily to eczema on hands as needed. 60 g 2     tretinoin (RETIN-A) 0.025 % topical gel Apply  topically At Bedtime. (Patient taking differently: Apply topically At Bedtime Apply  topically At Bedtime.) 45 g 12     ibuprofen (ADVIL/MOTRIN) 400 MG tablet Take 1 tablet (400 mg) by mouth every 6 hours as needed for moderate pain (Patient not taking: Reported on 8/2/2018) 40 tablet 1     No facility-administered encounter medications on file as of 8/8/2018.              Review Of Systems  Skin: As above  Eyes: negative  Ears/Nose/Throat: negative  Respiratory: No shortness of breath, dyspnea on exertion, cough, or hemoptysis  Cardiovascular: negative  Gastrointestinal: negative  Genitourinary: negative  Musculoskeletal: negative  Neurologic: negative  Psychiatric: negative  Hematologic/Lymphatic/Immunologic: negative  Endocrine: negative      O:   NAD, WDWN, Alert & Oriented, Mood & Affect wnl, Vitals stable   Here today alone   /75  Pulse 99  LMP 02/25/2018  SpO2 99%   General appearance normal   Vitals stable   Alert, oriented and in no acute distress     Small thin verrucous papule on plantar foot       Eyes: Conjunctivae/lids:Normal     ENT: Lips: normal    MSK:Normal    Pulm: Breathing Normal    Neuro/Psych: Orientation:Normal; Mood/Affect:Normal  A/P:  1. Plantar wart x 1 on right foot (tender to touch)  LN2:  Treated with LN2 for 5s for 1-2 cycles. Warned risks of blistering, pain, pigment change, scarring, and incomplete resolution.  Advised patient to return if lesions do not completely resolve.  Wound care sheet given.

## 2018-08-08 NOTE — MR AVS SNAPSHOT
After Visit Summary   8/8/2018    Luna Rivera    MRN: [id number]           Patient Information     Date Of Birth          1970        Visit Information        Provider Department      8/8/2018 10:00 AM Luna Tijerina PA-C [facility]        Today's Diagnoses     Plantar wart    -  1      Care Instructions    WOUND CARE INSTRUCTIONS   FOR CRYOSURGERY   This area treated with liquid nitrogen will form a blister. You do not need to bandage the area until after the blister forms and breaks (which may be a few days). When the blister breaks, begin daily dressing changes as follows:   1) Clean and dry the area with tap water using clean Q-tip or sterile gauze pad.   2) Apply Polysporin ointment or Bacitracin ointment over entire wound. Do NOT use Neosporin ointment.   3) Cover the wound with a band-aid or sterile non-stick gauze pad and micropore paper tape.   REPEAT THESE INSTRUCTIONS AT LEAST ONCE A DAY UNTIL THE WOUND HAS COMPLETELY HEALED.   It is an old wives tale that a wound heals better when it is exposed to air and allowed to dry out. The wound will heal faster with a better cosmetic result if it is kept moist with ointment and covered with a bandage.   Do not let the wound dry out.   IMPORTANT INFORMATION ON REVERSE SIDE   Supplies Needed:   *Cotton tipped applicators (Q-tips)   *Polysporin ointment or Bacitracin ointment (NOT NEOSPORIN)   *Band-aids, or non stick gauze pads and micropore paper tape   PATIENT INFORMATION   During the healing process you will notice a number of changes. All wounds develop a small halo of redness surrounding the wound. This means healing is occurring. Severe itching with extensive redness usually indicates sensitivity to the ointment or bandage tape used to dress the wound. You should call our office if this develops.   Swelling and/or discoloration around your surgical site is common, particularly when performed around the eye.   All  wounds normally drain. The larger the wound the more drainage there will be. After 7-10 days, you will notice the wound beginning to shrink and new skin will begin to grow. The wound is healed when you can see skin has formed over the entire area. A healed wound has a healthy, shiny look to the surface and is red to dark pink in color to normalize. Wounds may take approximately 4-6 weeks to heal. Larger wounds may take 6-8 weeks. After the wound is healed you may discontinue dressing changes.   You may experience a sensation of tightness as your wound heals. This is normal and will gradually subside.   Your healed wound may be sensitive to temperature changes. This sensitivity improves with time, but if you re having a lot of discomfort, try to avoid temperature extremes.   Patients frequently experience itching after their wound appears to have healed because of the continue healing under the skin. Plain Vaseline will help relieve the itching.                 Follow-ups after your visit        Who to contact     If you have questions or need follow up information about today's clinic visit or your schedule please contact [facility] directly at [phone].  Normal or non-critical lab and imaging results will be communicated to you by EverPowerhart, letter or phone within 4 business days after the clinic has received the results. If you do not hear from us within 7 days, please contact the clinic through [employer] or phone. If you have a critical or abnormal lab result, we will notify you by phone as soon as possible.  Submit refill requests through JDLab or call your pharmacy and they will forward the refill request to us. Please allow 3 business days for your refill to be completed.          Additional Information About Your Visit        JDLab Information     JDLab gives you secure access to your electronic health record. If you see a primary care provider, you can also send messages to your care team  and make appointments. If you have questions, please call your primary care clinic.  If you do not have a primary care provider, please call [phone] and they will assist you.        Care EveryWhere ID     This is your Care EveryWhere ID. This could be used by other organizations to access your [place] medical records  [id number]        Your Vitals Were     Pulse Last Period Pulse Oximetry             99 02/25/2018 99%          Blood Pressure from Last 3 Encounters:   08/08/18 131/75   08/02/18 122/68   07/13/18 114/72    Weight from Last 3 Encounters:   08/02/18 65.9 kg (145 lb 4.8 oz)   05/10/18 64.9 kg (143 lb)   04/13/18 64 kg (141 lb)              We Performed the Following     DESTRUCT BENIGN LESION, UP TO 14          Today's Medication Changes          These changes are accurate as of 8/8/18 12:45 PM.  If you have any questions, ask your nurse or doctor.               These medicines have changed or have updated prescriptions.        Dose/Directions    tretinoin 0.025 % topical gel   Commonly known as:  RETIN-A   This may have changed:    - how to take this  - when to take this  - additional instructions   Used for:  Acne, unspecified acne type        Apply  topically At Bedtime.   Quantity:  45 g   Refills:  12                Primary Care Provider Office Phone # Fax #    Catrina Ioana Jin [phone] [address] [facility] [address]        Equal Access to Services     ANA PRIETO AH: Hadii lakshmi baxtero Sokarin, waaxda luqadaha, qaybta kaalmada scaregyada, thierry you. So [facility] [phone].    ATENCIÓN: Si habla español, tiene a lema disposición servicios gratuitos de asistencia lingüística. Llame al [phone].    We comply with applicable federal civil rights laws and [place] laws. We do not discriminate on the basis of race, color, national origin, age, disability, sex, sexual orientation, or gender identity.            Thank you!      Thank you for choosing [facility]  for your care. Our goal is always to provide you with excellent care. Hearing back from our patients is one way we can continue to improve our services. Please take a few minutes to complete the written survey that you may receive in the mail after your visit with us. Thank you!             Your Updated Medication List - Protect others around you: Learn how to safely use, store and throw away your medicines at www.disposemymeds.org.          This list is accurate as of 8/8/18 12:45 PM.  Always use your most recent med list.                   Brand Name Dispense Instructions for use Diagnosis    acyclovir 400 MG tablet    ZOVIRAX    180 tablet    Take 1 tablet (400 mg) by mouth 2 times daily    Herpes simplex virus (HSV) infection       augmented betamethasone dipropionate 0.05 % ointment    DIPROLENE-AF    50 g    Apply twice daily as needed.    Chronic dermatitis of hands       cetirizine 10 MG tablet    zyrTEC     Take 10 mg by mouth daily        fluticasone 50 MCG/ACT spray    FLONASE    3 Bottle    USE TWO SPRAY(S) IN EACH NOSTRIL ONCE DAILY    Allergic rhinitis       hydrocortisone 2.5 % cream    ANUSOL-HC    28.35 g    Place rectally 2 times daily    External hemorrhoids       ibuprofen 400 MG tablet    ADVIL/MOTRIN    40 tablet    Take 1 tablet (400 mg) by mouth every 6 hours as needed for moderate pain    Postoperative state       omeprazole 20 MG CR capsule    priLOSEC    60 capsule    Take 1 capsule (20 mg) by mouth 2 times daily    Gastroesophageal reflux disease without esophagitis, RUQ abdominal pain       PRILOSEC PO      Take 20 mg by mouth daily as needed        senna-docusate 8.6-50 MG per tablet    SENOKOT-S;PERICOLACE    60 tablet    Take 1 tablet by mouth 2 times daily    Postoperative state       sucralfate 1 GM/10ML suspension    CARAFATE    420 mL    Take 10 mLs (1 g) by mouth 4 times daily    RUQ abdominal pain, Gastroesophageal reflux disease  without esophagitis       tacrolimus 0.1 % ointment    PROTOPIC    60 g    Apply twice daily to eczema on hands as needed.    Hand eczema       tretinoin 0.025 % topical gel    RETIN-A    45 g    Apply  topically At Bedtime.    Acne, unspecified acne type

## 2018-08-08 NOTE — TELEPHONE ENCOUNTER
Called [employer] at [phone] and per representative they did receive the request and it is still currently under review. Their turnaround time is 4-5 calendar days but can take up to 10 days.

## 2018-08-13 NOTE — TELEPHONE ENCOUNTER
Prior Authorization Approval    Authorization Effective Date: 8/12/2018  Authorization Expiration Date: 8/12/2019  Medication: omeprazole  Approved Dose/Quantity: 60/30 days  Reference #: [id number]   Insurance Company: [employer] [place] - Phone [phone] Fax [phone]  Which Pharmacy is filling the prescription (Not needed for infusion/clinic administered): [facility] [address] [place] [address] - [address]  Pharmacy Notified: Yes- pharmacy will notify patient once ready  Patient Notified: Yes

## 2018-09-25 ENCOUNTER — TELEPHONE (OUTPATIENT)
Dept: FAMILY MEDICINE | Facility: CLINIC | Age: 48
End: 2018-09-25

## 2018-09-25 DIAGNOSIS — R10.13 ABDOMINAL PAIN, EPIGASTRIC: Primary | ICD-10-CM

## 2018-09-25 NOTE — TELEPHONE ENCOUNTER
Reason for Call:  Other gastric problem    Detailed comments: Patient states she is still having problems after she eats.  Nex step?  She does not want an endoscopy again.    Phone Number Patient can be reached at: Home number on file [phone] (home)    Best Time: any    Can we leave a detailed message on this number? YES    Call taken on 9/25/2018 at 1:28 PM by Lisa Cho

## 2018-09-25 NOTE — TELEPHONE ENCOUNTER
US ordered.  Referral to GI - [facility] given # if pain or symptoms not improving and US normal.  Call to make appointment now.    COURTNEY Curiel

## 2018-09-25 NOTE — TELEPHONE ENCOUNTER
Patient informed of message below from provider.  Patient given number to [facility] [phone]. Provided patient number to radiology and transferred to radiology to make US appointment.

## 2018-09-25 NOTE — TELEPHONE ENCOUNTER
Patient is wanting to get a ultrasound done wondering about gallbladder? The pain is after she eats rt side of sternum . She is hoping to hear back today or I advised her to make appt. She is taking medication as prescribed.  Benita Langford RN

## 2018-09-27 ENCOUNTER — HOSPITAL ENCOUNTER (OUTPATIENT)
Dept: ULTRASOUND IMAGING | Facility: CLINIC | Age: 48
Discharge: HOME | End: 2018-09-27
Attending: NURSE PRACTITIONER | Admitting: NURSE PRACTITIONER
Payer: COMMERCIAL

## 2018-09-27 DIAGNOSIS — R10.13 ABDOMINAL PAIN, EPIGASTRIC: ICD-10-CM

## 2018-09-27 PROCEDURE — 76705 ECHO EXAM OF ABDOMEN: CPT

## 2018-10-24 ENCOUNTER — TRANSFERRED RECORDS (OUTPATIENT)
Dept: HEALTH INFORMATION MANAGEMENT | Facility: CLINIC | Age: 48
End: 2018-10-24

## 2018-10-24 ENCOUNTER — OFFICE VISIT (OUTPATIENT)
Dept: DERMATOLOGY | Facility: CLINIC | Age: 48
End: 2018-10-24
Payer: COMMERCIAL

## 2018-10-24 VITALS
OXYGEN SATURATION: 98 % | RESPIRATION RATE: 16 BRPM | SYSTOLIC BLOOD PRESSURE: 108 MMHG | HEART RATE: 95 BPM | DIASTOLIC BLOOD PRESSURE: 69 MMHG

## 2018-10-24 DIAGNOSIS — L30.9 CHRONIC DERMATITIS OF HANDS: Primary | ICD-10-CM

## 2018-10-24 DIAGNOSIS — L30.1 DYSHIDROTIC ECZEMA: ICD-10-CM

## 2018-10-24 DIAGNOSIS — B07.0 PLANTAR WART OF LEFT FOOT: ICD-10-CM

## 2018-10-24 PROCEDURE — 99212 OFFICE O/P EST SF 10 MIN: CPT | Mod: 25 | Performed by: PHYSICIAN ASSISTANT

## 2018-10-24 PROCEDURE — 17110 DESTRUCTION B9 LES UP TO 14: CPT | Performed by: PHYSICIAN ASSISTANT

## 2018-10-24 RX ORDER — TACROLIMUS 1 MG/G
OINTMENT TOPICAL 2 TIMES DAILY
Qty: 60 G | Refills: 0 | Status: SHIPPED | OUTPATIENT
Start: 2018-10-24 | End: 2018-12-26

## 2018-10-24 NOTE — NURSING NOTE
"Chief Complaint   Patient presents with     RECHECK     wart       Initial /69 (BP Location: Left arm, Patient Position: Chair, Cuff Size: Adult Regular)  Pulse 95  Resp 16  LMP 02/25/2018  SpO2 98% Estimated body mass index is 22.09 kg/(m^2) as calculated from the following:    Height as of 8/2/18: 1.727 m (5' 8\").    Weight as of 8/2/18: 65.9 kg (145 lb 4.8 oz).  Medications and allergies reviewed.    Amrita PARIS, CMA    "

## 2018-10-24 NOTE — MR AVS SNAPSHOT
After Visit Summary   10/24/2018    Luna Rivera    MRN: [id number]           Patient Information     Date Of Birth          1970        Visit Information        Provider Department      10/24/2018 11:00 AM Luna Tijerina PA-C [facility]        Care Instructions          Wound Care Instructions     FOR SUPERFICIAL WOUNDS     [facility] [phone]    [facility] [phone]                       AFTER 24 HOURS YOU SHOULD REMOVE THE BANDAGE AND BEGIN DAILY DRESSING CHANGES AS FOLLOWS:     1) Remove Dressing.     2) Clean and dry the area with tap water using a Q-tip or sterile gauze pad.     3) Apply Vaseline, Aquaphor, Polysporin ointment or Bacitracin ointment over entire wound.  Do NOT use Neosporin ointment.     4) Cover the wound with a band-aid, or a sterile non-stick gauze pad and micropore paper tape      REPEAT THESE INSTRUCTIONS AT LEAST ONCE A DAY UNTIL THE WOUND HAS COMPLETELY HEALED.    It is an old wives tale that a wound heals better when it is exposed to air and allowed to dry out. The wound will heal faster with a better cosmetic result if it is kept moist with ointment and covered with a bandage.    **Do not let the wound dry out.**      Supplies Needed:      *Cotton tipped applicators (Q-tips)    *Polysporin Ointment or Bacitracin Ointment (NOT NEOSPORIN)    *Band-aids or non-stick gauze pads and micropore paper tape.      PATIENT INFORMATION:    During the healing process you will notice a number of changes. All wounds develop a small halo of redness surrounding the wound.  This means healing is occurring. Severe itching with extensive redness usually indicates sensitivity to the ointment or bandage tape used to dress the wound.  You should call our office if this develops.      Swelling  and/or discoloration around your surgical site is common, particularly when performed around the eye.    All wounds normally drain.  The  larger the wound the more drainage there will be.  After 7-10 days, you will notice the wound beginning to shrink and new skin will begin to grow.  The wound is healed when you can see skin has formed over the entire area.  A healed wound has a healthy, shiny look to the surface and is red to dark pink in color to normalize.  Wounds may take approximately 4-6 weeks to heal.  Larger wounds may take 6-8 weeks.  After the wound is healed you may discontinue dressing changes.    You may experience a sensation of tightness as your wound heals. This is normal and will gradually subside.    Your healed wound may be sensitive to temperature changes. This sensitivity improves with time, but if you re having a lot of discomfort, try to avoid temperature extremes.    Patients frequently experience itching after their wound appears to have healed because of the continue healing under the skin.  Plain Vaseline will help relieve the itching.        POSSIBLE COMPLICATIONS    BLEEDIN. Leave the bandage in place.  2. Use tightly rolled up gauze or a cloth to apply direct pressure over the bandage for 30  minutes.  3. Reapply pressure for an additional 30 minutes if necessary  4. Use additional gauze and tape to maintain pressure once the bleeding has stopped.            Follow-ups after your visit        Who to contact     If you have questions or need follow up information about today's clinic visit or your schedule please contact [facility] directly at [phone].  Normal or non-critical lab and imaging results will be communicated to you by CICCWORLDhart, letter or phone within 4 business days after the clinic has received the results. If you do not hear from us within 7 days, please contact the clinic through MyChart or phone. If you have a critical or abnormal lab result, we will notify you by phone as soon as possible.  Submit refill requests through LETSGROOP or call your pharmacy and they will forward the  refill request to us. Please allow 3 business days for your refill to be completed.          Additional Information About Your Visit        MyChart Information     Wavebornhart gives you secure access to your electronic health record. If you see a primary care provider, you can also send messages to your care team and make appointments. If you have questions, please call your primary care clinic.  If you do not have a primary care provider, please call [phone] and they will assist you.        Care EveryWhere ID     This is your Care EveryWhere ID. This could be used by other organizations to access your [place] medical records  [id number]        Your Vitals Were     Pulse Respirations Last Period Pulse Oximetry          95 16 02/25/2018 98%         Blood Pressure from Last 3 Encounters:   10/24/18 108/69   08/08/18 131/75   08/02/18 122/68    Weight from Last 3 Encounters:   08/02/18 65.9 kg (145 lb 4.8 oz)   05/10/18 64.9 kg (143 lb)   04/13/18 64 kg (141 lb)              Today, you had the following     No orders found for display         Today's Medication Changes          These changes are accurate as of 10/24/18 11:29 AM.  If you have any questions, ask your nurse or doctor.               These medicines have changed or have updated prescriptions.        Dose/Directions    tretinoin 0.025 % topical gel   Commonly known as:  RETIN-A   This may have changed:    - how to take this  - when to take this  - additional instructions   Used for:  Acne, unspecified acne type        Apply  topically At Bedtime.   Quantity:  45 g   Refills:  12                Primary Care Provider Office Phone # Fax #    Catrina Ioana Jin [phone] [address] [facility] [address]        Equal Access to Services     [facility] : Devin Pierce, miryam moon, thierry young. So [facility] [phone].    ATENCIÓN: karen Murillo  a lema disposición servicios gratuitos de asistencia lingüística. Fahad lew [phone].    We comply with applicable federal civil rights laws and [place] laws. We do not discriminate on the basis of race, color, national origin, age, disability, sex, sexual orientation, or gender identity.            Thank you!     Thank you for choosing [facility]  for your care. Our goal is always to provide you with excellent care. Hearing back from our patients is one way we can continue to improve our services. Please take a few minutes to complete the written survey that you may receive in the mail after your visit with us. Thank you!             Your Updated Medication List - Protect others around you: Learn how to safely use, store and throw away your medicines at www.disposemymeds.org.          This list is accurate as of 10/24/18 11:29 AM.  Always use your most recent med list.                   Brand Name Dispense Instructions for use Diagnosis    acyclovir 400 MG tablet    ZOVIRAX    180 tablet    Take 1 tablet (400 mg) by mouth 2 times daily    Herpes simplex virus (HSV) infection       augmented betamethasone dipropionate 0.05 % ointment    DIPROLENE-AF    50 g    Apply twice daily as needed.    Chronic dermatitis of hands       fluticasone 50 MCG/ACT spray    FLONASE    3 Bottle    USE TWO SPRAY(S) IN EACH NOSTRIL ONCE DAILY    Allergic rhinitis       hydrocortisone 2.5 % cream    ANUSOL-HC    28.35 g    Place rectally 2 times daily    External hemorrhoids       ibuprofen 400 MG tablet    ADVIL/MOTRIN    40 tablet    Take 1 tablet (400 mg) by mouth every 6 hours as needed for moderate pain    Postoperative state       PRILOSEC PO      Take 20 mg by mouth daily as needed        sucralfate 1 GM/10ML suspension    CARAFATE    420 mL    Take 10 mLs (1 g) by mouth 4 times daily    RUQ abdominal pain, Gastroesophageal reflux disease without esophagitis       tacrolimus 0.1 % ointment    PROTOPIC    60 g     Apply twice daily to eczema on hands as needed.    Hand eczema       tretinoin 0.025 % topical gel    RETIN-A    45 g    Apply  topically At Bedtime.    Acne, unspecified acne type

## 2018-10-24 NOTE — PATIENT INSTRUCTIONS
Wound Care Instructions     FOR SUPERFICIAL WOUNDS     [facility] [phone]    [facility] [phone]                       AFTER 24 HOURS YOU SHOULD REMOVE THE BANDAGE AND BEGIN DAILY DRESSING CHANGES AS FOLLOWS:     1) Remove Dressing.     2) Clean and dry the area with tap water using a Q-tip or sterile gauze pad.     3) Apply Vaseline, Aquaphor, Polysporin ointment or Bacitracin ointment over entire wound.  Do NOT use Neosporin ointment.     4) Cover the wound with a band-aid, or a sterile non-stick gauze pad and micropore paper tape      REPEAT THESE INSTRUCTIONS AT LEAST ONCE A DAY UNTIL THE WOUND HAS COMPLETELY HEALED.    It is an old wives tale that a wound heals better when it is exposed to air and allowed to dry out. The wound will heal faster with a better cosmetic result if it is kept moist with ointment and covered with a bandage.    **Do not let the wound dry out.**      Supplies Needed:      *Cotton tipped applicators (Q-tips)    *Polysporin Ointment or Bacitracin Ointment (NOT NEOSPORIN)    *Band-aids or non-stick gauze pads and micropore paper tape.      PATIENT INFORMATION:    During the healing process you will notice a number of changes. All wounds develop a small halo of redness surrounding the wound.  This means healing is occurring. Severe itching with extensive redness usually indicates sensitivity to the ointment or bandage tape used to dress the wound.  You should call our office if this develops.      Swelling  and/or discoloration around your surgical site is common, particularly when performed around the eye.    All wounds normally drain.  The larger the wound the more drainage there will be.  After 7-10 days, you will notice the wound beginning to shrink and new skin will begin to grow.  The wound is healed when you can see skin has formed over the entire area.  A healed wound has a healthy, shiny look to the surface and is red to dark pink in color  to normalize.  Wounds may take approximately 4-6 weeks to heal.  Larger wounds may take 6-8 weeks.  After the wound is healed you may discontinue dressing changes.    You may experience a sensation of tightness as your wound heals. This is normal and will gradually subside.    Your healed wound may be sensitive to temperature changes. This sensitivity improves with time, but if you re having a lot of discomfort, try to avoid temperature extremes.    Patients frequently experience itching after their wound appears to have healed because of the continue healing under the skin.  Plain Vaseline will help relieve the itching.        POSSIBLE COMPLICATIONS    BLEEDIN. Leave the bandage in place.  2. Use tightly rolled up gauze or a cloth to apply direct pressure over the bandage for 30  minutes.  3. Reapply pressure for an additional 30 minutes if necessary  4. Use additional gauze and tape to maintain pressure once the bleeding has stopped.

## 2018-10-24 NOTE — LETTER
10/24/2018         RE: Luna Rivera  [address] [facility] [address]        Dear Colleague,    Thank you for referring your patient, Luna Rivera, to the [facility]. Please see a copy of my visit note below.    Luna Rivera is a 47 year old year old female patient here today for recheck wart on left foot. She notes that wart appear to resolve after last treatment then then returned shortly her last treatment. She would like to cut it out. She reports that she has been using betamethasone for dyshidrotic eczema but reports protopic helped her skin more than betamethasone. She would like to try to get protopic covered again. Patient has no other skin complaints today.  Remainder of the HPI, Meds, PMH, Allergies, FH, and SH was reviewed in chart.   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     Contact dermatitis and other eczema, due to unspecified cause      Enteritis due to [place] virus 2002     Herpes simplex without mention of complication     Last outbreak      Intramural and subserous leiomyoma of uterus 3/19/2018     Irritable bowel syndrome      PONV (postoperative nausea and vomiting)        Past Surgical History:   Procedure Laterality Date     ARTHROSCOPY KNEE WITH MEDIAL MENISCECTOMY  2014    Procedure: ARTHROSCOPY KNEE WITH MEDIAL MENISCECTOMY;  Surgeon: Alejandro Dodge MD;  Location: [facility]       DELIVERY ONLY  ,      C LIGATE FALLOPIAN TUBE,POSTPARTUM       ESOPHAGOSCOPY, GASTROSCOPY, DUODENOSCOPY (EGD), COMBINED N/A 2016    Procedure: COMBINED ESOPHAGOSCOPY, GASTROSCOPY, DUODENOSCOPY (EGD), BIOPSY SINGLE OR MULTIPLE;  Surgeon: Jose Mora MD;  Location: [facility]      HYSTEROSCOPY, SURGICAL; W/ ENDOMETRIAL ABLATION, ANY METHOD  3/30/10    Novasure ablation     HYSTERECTOMY TOTAL ABDOMINAL, BILATERAL  SALPINGO-OOPHORECTOMY, COMBINED Bilateral 3/27/2018    TRACI only; tubes removed; ovaries left in        Family History   Problem Relation Age of Onset     Breast Cancer Mother      age 46     Cancer Mother      Throat, larynx (d/t radiation from breast cancer)tongue cancer 9/2011     Hypertension Father      Cancer Father      lung CA     Neurologic Disorder Maternal Grandmother      Parkinson's     C.A.D. 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 No     Social History Narrative    Home with kids                           Outpatient Encounter Prescriptions as of 10/24/2018   Medication Sig Dispense Refill     acyclovir (ZOVIRAX) 400 MG tablet Take 1 tablet (400 mg) by mouth 2 times daily 180 tablet 3     augmented betamethasone dipropionate (DIPROLENE-AF) 0.05 % ointment Apply twice daily as needed. 50 g 2     fluticasone (FLONASE) 50 MCG/ACT spray USE TWO SPRAY(S) IN EACH NOSTRIL ONCE DAILY 3 Bottle 2     hydrocortisone (ANUSOL-HC) 2.5 % cream Place rectally 2 times daily  28.35 g 1     Omeprazole (PRILOSEC PO) Take 20 mg by mouth daily as needed        tretinoin (RETIN-A) 0.025 % topical gel Apply  topically At Bedtime. (Patient taking differently: Apply topically At Bedtime Apply  topically At Bedtime.) 45 g 12     ibuprofen (ADVIL/MOTRIN) 400 MG tablet Take 1 tablet (400 mg) by mouth every 6 hours as needed for moderate pain (Patient not taking: Reported on 8/2/2018) 40 tablet 1     sucralfate (CARAFATE) 1 GM/10ML suspension Take 10 mLs (1 g) by mouth 4 times daily (Patient not taking: Reported on 10/24/2018) 420 mL 1     tacrolimus (PROTOPIC) 0.1 % ointment Apply twice daily to eczema on hands as needed. (Patient not taking: Reported on 10/24/2018) 60 g 2     [DISCONTINUED] cetirizine (ZYRTEC) 10 MG tablet Take 10 mg by mouth daily       [DISCONTINUED] omeprazole (PRILOSEC) 20 MG CR capsule Take 1 capsule (20 mg) by mouth 2 times daily 60 capsule 1     [DISCONTINUED] senna-docusate (SENOKOT-S;PERICOLACE) 8.6-50 MG per tablet Take 1 tablet by mouth 2 times daily 60 tablet 0     No facility-administered encounter medications on file as of 10/24/2018.              Review Of Systems  Skin: As above  Eyes: negative  Ears/Nose/Throat: negative  Respiratory: No shortness of breath, dyspnea on exertion, cough, or hemoptysis  Cardiovascular: negative  Gastrointestinal: negative  Genitourinary: negative  Musculoskeletal: negative  Neurologic: negative  Psychiatric: negative  Hematologic/Lymphatic/Immunologic: negative  Endocrine: negative      O:   NAD, WDWN, Alert & Oriented, Mood & Affect wnl, Vitals stable   Here today alone   /69 (BP Location: Left arm, Patient Position: Chair, Cuff Size: Adult Regular)  Pulse 95  Resp 16  LMP 02/25/2018  SpO2 98%   General appearance normal   Vitals stable   Alert, oriented and in no acute distress     Small verrucous papule on left plantar foot   Eczematous thin plaque on hands       Eyes: Conjunctivae/lids:Normal     ENT: Lips:  normal    MSK:Normal    Pulm: Breathing Normal    Neuro/Psych: Orientation:Normal; Mood/Affect:Normal  A/P:  1. Chronic Hand dermatitis  Has been using betamethasone chronically. Would prefer to use steroid free medication: protopic. Will resend to see if this will get approved.   2. Plantar wart x 1 on left plantar foot   After consent, anesthesia with LEC and prep, tangential excision performed.  No complications and routine wound care.   Skin care regimen reviewed with patient: Eliminate harsh soaps, i.e. Dial, zest, irsih spring; Mild soaps such as Cetaphil or Dove sensitive skin, avoid hot or cold showers, aggressive use of emollients including vanicream, cetaphil or cerave discussed with patient.   Return to clinic as needed.       Again, thank you for allowing me to participate in the care of your patient.        Sincerely,        Luna Roldan PA-C

## 2018-10-24 NOTE — PROGRESS NOTES
Luna Rivera is a 47 year old year old female patient here today for recheck wart on left foot. She notes that wart appear to resolve after last treatment then then returned shortly her last treatment. She would like to cut it out. She reports that she has been using betamethasone for dyshidrotic eczema but reports protopic helped her skin more than betamethasone. She would like to try to get protopic covered again. Patient has no other skin complaints today.  Remainder of the HPI, Meds, PMH, Allergies, FH, and SH was reviewed in chart.   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     Contact dermatitis and other eczema, due to unspecified cause      Enteritis due to [place] virus 2002     Herpes simplex without mention of complication     Last outbreak      Intramural and subserous leiomyoma of uterus 3/19/2018     Irritable bowel syndrome      PONV (postoperative nausea and vomiting)        Past Surgical History:   Procedure Laterality Date     ARTHROSCOPY KNEE WITH MEDIAL MENISCECTOMY  2014    Procedure: ARTHROSCOPY KNEE WITH MEDIAL MENISCECTOMY;  Surgeon: Alejandro Dodge MD;  Location: [facility]       DELIVERY ONLY  2002     C LIGATE FALLOPIAN TUBE,POSTPARTUM       ESOPHAGOSCOPY, GASTROSCOPY, DUODENOSCOPY (EGD), COMBINED N/A 2016    Procedure: COMBINED ESOPHAGOSCOPY, GASTROSCOPY, DUODENOSCOPY (EGD), BIOPSY SINGLE OR MULTIPLE;  Surgeon: Jose Mora MD;  Location: [facility] HYSTEROSCOPY, SURGICAL; W/ ENDOMETRIAL ABLATION, ANY METHOD  3/30/10    Novasure ablation     HYSTERECTOMY TOTAL ABDOMINAL, BILATERAL SALPINGO-OOPHORECTOMY, COMBINED Bilateral 3/27/2018    TRACI only; tubes removed; ovaries left in        Family History   Problem Relation Age of Onset     Breast Cancer Mother      age 46     Cancer Mother      Throat, larynx (d/t radiation from breast cancer)tongue  cancer 9/2011     Hypertension Father      Cancer Father      lung CA     Neurologic Disorder Maternal Grandmother      Parkinson's     C.A.D. 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 No     Social History Narrative    Home with kids                           Outpatient Encounter Prescriptions as of 10/24/2018   Medication Sig Dispense Refill     acyclovir (ZOVIRAX) 400 MG tablet Take 1 tablet (400 mg) by mouth 2 times daily 180 tablet 3     augmented betamethasone dipropionate (DIPROLENE-AF) 0.05 % ointment Apply twice daily as needed. 50 g 2     fluticasone (FLONASE) 50 MCG/ACT spray USE TWO SPRAY(S) IN EACH NOSTRIL ONCE DAILY 3 Bottle 2     hydrocortisone (ANUSOL-HC) 2.5 % cream Place rectally 2 times daily 28.35 g 1     Omeprazole (PRILOSEC PO) Take 20 mg by mouth daily as needed        tretinoin (RETIN-A) 0.025 % topical gel Apply  topically At Bedtime. (Patient taking differently: Apply topically At Bedtime Apply  topically At Bedtime.) 45 g 12     ibuprofen  (ADVIL/MOTRIN) 400 MG tablet Take 1 tablet (400 mg) by mouth every 6 hours as needed for moderate pain (Patient not taking: Reported on 8/2/2018) 40 tablet 1     sucralfate (CARAFATE) 1 GM/10ML suspension Take 10 mLs (1 g) by mouth 4 times daily (Patient not taking: Reported on 10/24/2018) 420 mL 1     tacrolimus (PROTOPIC) 0.1 % ointment Apply twice daily to eczema on hands as needed. (Patient not taking: Reported on 10/24/2018) 60 g 2     [DISCONTINUED] cetirizine (ZYRTEC) 10 MG tablet Take 10 mg by mouth daily       [DISCONTINUED] omeprazole (PRILOSEC) 20 MG CR capsule Take 1 capsule (20 mg) by mouth 2 times daily 60 capsule 1     [DISCONTINUED] senna-docusate (SENOKOT-S;PERICOLACE) 8.6-50 MG per tablet Take 1 tablet by mouth 2 times daily 60 tablet 0     No facility-administered encounter medications on file as of 10/24/2018.              Review Of Systems  Skin: As above  Eyes: negative  Ears/Nose/Throat: negative  Respiratory: No shortness of breath, dyspnea on exertion, cough, or hemoptysis  Cardiovascular: negative  Gastrointestinal: negative  Genitourinary: negative  Musculoskeletal: negative  Neurologic: negative  Psychiatric: negative  Hematologic/Lymphatic/Immunologic: negative  Endocrine: negative      O:   NAD, WDWN, Alert & Oriented, Mood & Affect wnl, Vitals stable   Here today alone   /69 (BP Location: Left arm, Patient Position: Chair, Cuff Size: Adult Regular)  Pulse 95  Resp 16  LMP 02/25/2018  SpO2 98%   General appearance normal   Vitals stable   Alert, oriented and in no acute distress     Small verrucous papule on left plantar foot   Eczematous thin plaque on hands       Eyes: Conjunctivae/lids:Normal     ENT: Lips: normal    MSK:Normal    Pulm: Breathing Normal    Neuro/Psych: Orientation:Normal; Mood/Affect:Normal  A/P:  1. Chronic Hand dermatitis  Has been using betamethasone chronically. Would prefer to use steroid free medication: protopic. Will resend to see if this will get  approved.   2. Plantar wart x 1 on left plantar foot   After consent, anesthesia with LEC and prep, tangential excision performed.  No complications and routine wound care.   Skin care regimen reviewed with patient: Eliminate harsh soaps, i.e. Dial, zest, irsih spring; Mild soaps such as Cetaphil or Dove sensitive skin, avoid hot or cold showers, aggressive use of emollients including vanicream, cetaphil or cerave discussed with patient.   Return to clinic as needed.

## 2018-11-01 ENCOUNTER — HOSPITAL ENCOUNTER (EMERGENCY)
Facility: CLINIC | Age: 48
Discharge: HOME | End: 2018-11-01
Attending: EMERGENCY MEDICINE | Admitting: EMERGENCY MEDICINE
Payer: COMMERCIAL

## 2018-11-01 ENCOUNTER — APPOINTMENT (OUTPATIENT)
Dept: CT IMAGING | Facility: CLINIC | Age: 48
End: 2018-11-01
Attending: EMERGENCY MEDICINE
Payer: COMMERCIAL

## 2018-11-01 VITALS
WEIGHT: 140 LBS | SYSTOLIC BLOOD PRESSURE: 137 MMHG | HEIGHT: 68 IN | DIASTOLIC BLOOD PRESSURE: 78 MMHG | HEART RATE: 88 BPM | TEMPERATURE: 98.3 F | RESPIRATION RATE: 16 BRPM | OXYGEN SATURATION: 99 % | BODY MASS INDEX: 21.22 KG/M2

## 2018-11-01 DIAGNOSIS — R10.13 ABDOMINAL PAIN, EPIGASTRIC: ICD-10-CM

## 2018-11-01 LAB
ALBUMIN SERPL-MCNC: 4 G/DL (ref 3.4–5)
ALBUMIN UR-MCNC: NEGATIVE MG/DL
ALP SERPL-CCNC: 40 U/L (ref 40–150)
ALT SERPL W P-5'-P-CCNC: 21 U/L (ref 0–50)
ANION GAP SERPL CALCULATED.3IONS-SCNC: 6 MMOL/L (ref 3–14)
APPEARANCE UR: CLEAR
AST SERPL W P-5'-P-CCNC: 12 U/L (ref 0–45)
BACTERIA #/AREA URNS HPF: ABNORMAL /HPF
BASOPHILS # BLD AUTO: 0 10E9/L (ref 0–0.2)
BASOPHILS NFR BLD AUTO: 0.4 %
BILIRUB SERPL-MCNC: 0.8 MG/DL (ref 0.2–1.3)
BILIRUB UR QL STRIP: NEGATIVE
BUN SERPL-MCNC: 13 MG/DL (ref 7–30)
CALCIUM SERPL-MCNC: 8.5 MG/DL (ref 8.5–10.1)
CHLORIDE SERPL-SCNC: 110 MMOL/L (ref 94–109)
CO2 SERPL-SCNC: 26 MMOL/L (ref 20–32)
COLOR UR AUTO: ABNORMAL
CREAT SERPL-MCNC: 0.88 MG/DL (ref 0.52–1.04)
DIFFERENTIAL METHOD BLD: NORMAL
EOSINOPHIL # BLD AUTO: 0 10E9/L (ref 0–0.7)
EOSINOPHIL NFR BLD AUTO: 0.4 %
ERYTHROCYTE [DISTWIDTH] IN BLOOD BY AUTOMATED COUNT: 12.7 % (ref 10–15)
GFR SERPL CREATININE-BSD FRML MDRD: 68 ML/MIN/1.7M2
GLUCOSE SERPL-MCNC: 97 MG/DL (ref 70–99)
GLUCOSE UR STRIP-MCNC: NEGATIVE MG/DL
HCT VFR BLD AUTO: 42.2 % (ref 35–47)
HGB BLD-MCNC: 14 G/DL (ref 11.7–15.7)
HGB UR QL STRIP: ABNORMAL
IMM GRANULOCYTES # BLD: 0 10E9/L (ref 0–0.4)
IMM GRANULOCYTES NFR BLD: 0.3 %
KETONES UR STRIP-MCNC: 5 MG/DL
LEUKOCYTE ESTERASE UR QL STRIP: NEGATIVE
LIPASE SERPL-CCNC: 124 U/L (ref 73–393)
LYMPHOCYTES # BLD AUTO: 1.4 10E9/L (ref 0.8–5.3)
LYMPHOCYTES NFR BLD AUTO: 19.5 %
MCH RBC QN AUTO: 31.2 PG (ref 26.5–33)
MCHC RBC AUTO-ENTMCNC: 33.2 G/DL (ref 31.5–36.5)
MCV RBC AUTO: 94 FL (ref 78–100)
MONOCYTES # BLD AUTO: 0.3 10E9/L (ref 0–1.3)
MONOCYTES NFR BLD AUTO: 4 %
NEUTROPHILS # BLD AUTO: 5.3 10E9/L (ref 1.6–8.3)
NEUTROPHILS NFR BLD AUTO: 75.4 %
NITRATE UR QL: NEGATIVE
NRBC # BLD AUTO: 0 10*3/UL
NRBC BLD AUTO-RTO: 0 /100
PH UR STRIP: 5 PH (ref 5–7)
PLATELET # BLD AUTO: 240 10E9/L (ref 150–450)
POTASSIUM SERPL-SCNC: 3.6 MMOL/L (ref 3.4–5.3)
PROT SERPL-MCNC: 7 G/DL (ref 6.8–8.8)
RBC # BLD AUTO: 4.49 10E12/L (ref 3.8–5.2)
RBC #/AREA URNS AUTO: 1 /HPF (ref 0–2)
SODIUM SERPL-SCNC: 142 MMOL/L (ref 133–144)
SOURCE: ABNORMAL
SP GR UR STRIP: 1.01 (ref 1–1.03)
SQUAMOUS #/AREA URNS AUTO: <1 /HPF (ref 0–1)
UROBILINOGEN UR STRIP-MCNC: 0 MG/DL (ref 0–2)
WBC # BLD AUTO: 7 10E9/L (ref 4–11)
WBC #/AREA URNS AUTO: <1 /HPF (ref 0–5)

## 2018-11-01 PROCEDURE — 83690 ASSAY OF LIPASE: CPT | Performed by: EMERGENCY MEDICINE

## 2018-11-01 PROCEDURE — 99284 EMERGENCY DEPT VISIT MOD MDM: CPT | Mod: Z6 | Performed by: EMERGENCY MEDICINE

## 2018-11-01 PROCEDURE — 96374 THER/PROPH/DIAG INJ IV PUSH: CPT

## 2018-11-01 PROCEDURE — 74177 CT ABD & PELVIS W/CONTRAST: CPT

## 2018-11-01 PROCEDURE — 85025 COMPLETE CBC W/AUTO DIFF WBC: CPT | Performed by: EMERGENCY MEDICINE

## 2018-11-01 PROCEDURE — 25000125 ZZHC RX 250: Performed by: EMERGENCY MEDICINE

## 2018-11-01 PROCEDURE — 96375 TX/PRO/DX INJ NEW DRUG ADDON: CPT

## 2018-11-01 PROCEDURE — 96361 HYDRATE IV INFUSION ADD-ON: CPT

## 2018-11-01 PROCEDURE — 81001 URINALYSIS AUTO W/SCOPE: CPT | Performed by: EMERGENCY MEDICINE

## 2018-11-01 PROCEDURE — 25000128 H RX IP 250 OP 636: Performed by: EMERGENCY MEDICINE

## 2018-11-01 PROCEDURE — 80053 COMPREHEN METABOLIC PANEL: CPT | Performed by: EMERGENCY MEDICINE

## 2018-11-01 PROCEDURE — 99285 EMERGENCY DEPT VISIT HI MDM: CPT | Mod: 25

## 2018-11-01 RX ORDER — LORAZEPAM 2 MG/ML
0.5 INJECTION INTRAMUSCULAR ONCE
Status: COMPLETED | OUTPATIENT
Start: 2018-11-01 | End: 2018-11-01

## 2018-11-01 RX ORDER — SODIUM CHLORIDE 9 MG/ML
1000 INJECTION, SOLUTION INTRAVENOUS CONTINUOUS
Status: DISCONTINUED | OUTPATIENT
Start: 2018-11-01 | End: 2018-11-01 | Stop reason: HOSPADM

## 2018-11-01 RX ORDER — IOPAMIDOL 755 MG/ML
68 INJECTION, SOLUTION INTRAVASCULAR ONCE
Status: COMPLETED | OUTPATIENT
Start: 2018-11-01 | End: 2018-11-01

## 2018-11-01 RX ORDER — KETOROLAC TROMETHAMINE 30 MG/ML
30 INJECTION, SOLUTION INTRAMUSCULAR; INTRAVENOUS ONCE
Status: COMPLETED | OUTPATIENT
Start: 2018-11-01 | End: 2018-11-01

## 2018-11-01 RX ADMIN — IOPAMIDOL 68 ML: 755 INJECTION, SOLUTION INTRAVENOUS at 19:32

## 2018-11-01 RX ADMIN — SODIUM CHLORIDE 1000 ML: 9 INJECTION, SOLUTION INTRAVENOUS at 18:44

## 2018-11-01 RX ADMIN — LORAZEPAM 0.5 MG: 2 INJECTION INTRAMUSCULAR; INTRAVENOUS at 18:49

## 2018-11-01 RX ADMIN — SODIUM CHLORIDE 57 ML: 9 INJECTION, SOLUTION INTRAVENOUS at 19:33

## 2018-11-01 RX ADMIN — KETOROLAC TROMETHAMINE 30 MG: 30 INJECTION, SOLUTION INTRAMUSCULAR at 18:45

## 2018-11-01 ASSESSMENT — ENCOUNTER SYMPTOMS
FACIAL SWELLING: 0
PHOTOPHOBIA: 0
SORE THROAT: 0
CHILLS: 0
FEVER: 0
BACK PAIN: 0
ABDOMINAL PAIN: 1
FREQUENCY: 0
HEADACHES: 0
SHORTNESS OF BREATH: 0

## 2018-11-01 NOTE — ED AVS SNAPSHOT
[facility] Emergency Department    [address] [address]    Phone:  [phone]    Fax:  [phone]                                       Luna Rivera   MRN: [id number]    Department:  [facility] Emergency Department   Date of Visit:  11/1/2018           After Visit Summary Signature Page     I have received my discharge instructions, and my questions have been answered. I have discussed any challenges I see with this plan with the nurse or doctor.    ..........................................................................................................................................  Patient/Patient Representative Signature      ..........................................................................................................................................  Patient Representative Print Name and Relationship to Patient    ..................................................               ................................................  Date                                   Time    ..........................................................................................................................................  Reviewed by Signature/Title    ...................................................              ..............................................  Date                                               Time          22EPIC Rev 08/18

## 2018-11-01 NOTE — ED AVS SNAPSHOT
[facility] Emergency Department    [address] [address]    Phone:  [phone]    Fax:  [phone]                                       Luna Rivera   MRN: [id number]    Department:  [facility] Emergency Department   Date of Visit:  11/1/2018           Patient Information     Date Of Birth          1970        Your diagnoses for this visit were:     Abdominal pain, epigastric        You were seen by Gennaro Godoy MD.      Follow-up Information     Follow up with Catrina Jin NP.    Specialty:  Nurse Practitioner - Family    Contact information:    [address] [facility] [address]  [phone]          Discharge Instructions         *Abdominal Pain, Unknown Cause (Female)    The exact cause of your abdominal (stomach) pain is not certain. This does not mean that this is something to worry about, or the right tests were not done. Everyone likes to know the exact cause of the problem, but sometimes with abdominal pain, there is no clear-cut cause, and this could be a good thing. The good news is that your symptoms can be treated, and you will feel better.   Your condition does not seem serious now; however, sometimes the signs of a serious problem may take more time to appear. For this reason, it is important for you to watch for any new symptoms, problems, or worsening of your condition.  Over the next few days, the abdominal pain may come and go, or be continuous. Other common symptoms can include nausea and vomiting. Sometimes it can be difficult to tell if you feel nauseous, you may just feel bad and not associate that feeling with nausea. Constipation, diarrhea, and a fever may go along with the pain.  The pain may continue even if treated correctly over the following days. Depending on how things go, sometimes the cause can become clear and may require further or different treatment. Additional evaluations, medications, or tests may be needed.  Home  care  Your health care provider may prescribe medications for pain, symptoms, or an infection.  Follow the health care provider's instructions for taking these medications.  General care    Rest until your next exam. No strenuous activities.    Try to find positions that ease discomfort. A small pillow placed on the abdomen may help relieve pain.    Something warm on your abdomen (such as a heating pad) may help, but be careful not to burn yourself.  Diet    Do not force yourself to eat, especially if having cramps, vomiting, or diarrhea.    Water is important so you do not get dehydrated. Soup may also be good. Sports drinks may also help, especially if they are not too acidic. Make sure you don't drink sugary drinks as this can make things worse. Take liquids in small amounts. Do not guzzle them.    Caffeine sometimes makes the pain and cramping worse.    Avoid dairy products if you have vomiting or diarrhea.    Don't eat large amounts at a time. Wait a few minutes between bites.    Eat a diet low in fiber (called a low-residue diet). Foods allowed include refined breads, white rice, fruit and vegetable juices without pulp, tender meats. These foods will pass more easily through the intestine.    Avoid fried or fatty foods, dairy, alcohol and spicy foods until your symptoms go away.  Follow-up care  Follow up with your health care provider as instructed, or if your pain does not begin to improve in the next 24 hours.  When to seek medical care  Seek prompt medical care if any of the following occur:    Pain gets worse or moves to the right lower abdomen    New or worsening vomiting or diarrhea    Swelling of the abdomen    Unable to pass stool for more than three days    New fever over 101  F (38.3 C), or rising fever    Blood in vomit or bowel movements (dark red or black color)    Jaundice (yellow color of eyes and skin)    Weakness, dizziness    Chest, arm, back, neck or jaw pain    Unexpected vaginal bleeding  or missed period  Call 911  Call emergency services if any of the following occur:    Trouble breathing    Confusion    Fainting or loss of consciousness    Rapid heart rate    Seizure    [id number] [employer], [address], [place] [address]. All rights reserved. This information is not intended as a substitute for professional medical care. Always follow your healthcare professional's instructions.      Follow-up with primary care regarding further evaluation, upper and lower endoscopy, functional gallbladder study.    24 Hour Appointment Hotline       To make an appointment at any [place] clinic, call [phone] ([phone]). If you don't have a family doctor or clinic, we will help you find one. [place] clinics are conveniently located to serve the needs of you and your family.          ED Discharge Orders     NM Hepatobiliary Scan w GB EF                    Review of your medicines      Our records show that you are taking the medicines listed below. If these are incorrect, please call your family doctor or clinic.        Dose / Directions Last dose taken    acyclovir 400 MG tablet   Commonly known as:  ZOVIRAX   Dose:  400 mg   Quantity:  180 tablet        Take 1 tablet (400 mg) by mouth 2 times daily   Refills:  3        augmented betamethasone dipropionate 0.05 % ointment   Commonly known as:  DIPROLENE-AF   Quantity:  50 g        Apply twice daily as needed.   Refills:  2        fluticasone 50 MCG/ACT spray   Commonly known as:  FLONASE   Quantity:  3 Bottle        USE TWO SPRAY(S) IN EACH NOSTRIL ONCE DAILY   Refills:  2        hydrocortisone 2.5 % cream   Commonly known as:  ANUSOL-HC   Quantity:  28.35 g        Place rectally 2 times daily   Refills:  1        ibuprofen 400 MG tablet   Commonly known as:  ADVIL/MOTRIN   Dose:  400 mg   Quantity:  40 tablet        Take 1 tablet (400 mg) by mouth every 6 hours as needed for moderate pain   Refills:  1        PRILOSEC PO   Dose:  20  mg        Take 20 mg by mouth daily as needed   Refills:  0        sucralfate 1 GM/10ML suspension   Commonly known as:  CARAFATE   Dose:  1 g   Quantity:  420 mL        Take 10 mLs (1 g) by mouth 4 times daily   Refills:  1        * tacrolimus 0.1 % ointment   Commonly known as:  PROTOPIC   Quantity:  60 g        Apply twice daily to eczema on hands as needed.   Refills:  2        * tacrolimus 0.1 % ointment   Commonly known as:  PROTOPIC   Quantity:  60 g        Apply topically 2 times daily   Refills:  0        tretinoin 0.025 % topical gel   Commonly known as:  RETIN-A   Quantity:  45 g        Apply  topically At Bedtime.   Refills:  12        * Notice:  This list has 2 medication(s) that are the same as other medications prescribed for you. Read the directions carefully, and ask your doctor or other care provider to review them with you.            Procedures and tests performed during your visit     CBC with platelets differential    CT Abdomen Pelvis w Contrast    Comprehensive metabolic panel    Give 20 ounces of water 15 minutes before CT of abdomen    Lipase    UA reflex to Microscopic      Orders Needing Specimen Collection     None      Pending Results     No orders found from 10/30/2018 to 11/2/2018.            Pending Culture Results     No orders found from 10/30/2018 to 11/2/2018.            Pending Results Instructions     If you had any lab results that were not finalized at the time of your Discharge, you can call the ED Lab Result RN at [phone]. You will be contacted by this team for any positive Lab results or changes in treatment. The nurses are available 7 days a week from 10A to 6:30P.  You can leave a message 24 hours per day and they will return your call.        Test Results From Your Hospital Stay        11/1/2018  6:58 PM      Component Results     Component Value Ref Range & Units Status    WBC 7.0 4.0 - 11.0 10e9/L Final    RBC Count 4.49 3.8 - 5.2 10e12/L Final    Hemoglobin 14.0  11.7 - 15.7 g/dL Final    Hematocrit 42.2 35.0 - 47.0 % Final    MCV 94 78 - 100 fl Final    MCH 31.2 26.5 - 33.0 pg Final    MCHC 33.2 31.5 - 36.5 g/dL Final    RDW 12.7 10.0 - 15.0 % Final    Platelet Count 240 150 - 450 10e9/L Final    Diff Method Automated Method  Final    % Neutrophils 75.4 % Final    % Lymphocytes 19.5 % Final    % Monocytes 4.0 % Final    % Eosinophils 0.4 % Final    % Basophils 0.4 % Final    % Immature Granulocytes 0.3 % Final    Nucleated RBCs 0 0 /100 Final    Absolute Neutrophil 5.3 1.6 - 8.3 10e9/L Final    Absolute Lymphocytes 1.4 0.8 - 5.3 10e9/L Final    Absolute Monocytes 0.3 0.0 - 1.3 10e9/L Final    Absolute Eosinophils 0.0 0.0 - 0.7 10e9/L Final    Absolute Basophils 0.0 0.0 - 0.2 10e9/L Final    Abs Immature Granulocytes 0.0 0 - 0.4 10e9/L Final    Absolute Nucleated RBC 0.0  Final         11/1/2018  7:34 PM      Component Results     Component Value Ref Range & Units Status    Sodium 142 133 - 144 mmol/L Final    Potassium 3.6 3.4 - 5.3 mmol/L Final    Chloride 110 (H) 94 - 109 mmol/L Final    Carbon Dioxide 26 20 - 32 mmol/L Final    Anion Gap 6 3 - 14 mmol/L Final    Glucose 97 70 - 99 mg/dL Final    Urea Nitrogen 13 7 - 30 mg/dL Final    Creatinine 0.88 0.52 - 1.04 mg/dL Final    GFR Estimate 68 >60 mL/min/1.7m2 Final    Non  GFR Calc    GFR Estimate If Black 82 >60 mL/min/1.7m2 Final    African American GFR Calc    Calcium 8.5 8.5 - 10.1 mg/dL Final    Bilirubin Total 0.8 0.2 - 1.3 mg/dL Final    Albumin 4.0 3.4 - 5.0 g/dL Final    Protein Total 7.0 6.8 - 8.8 g/dL Final    Alkaline Phosphatase 40 40 - 150 U/L Final    ALT 21 0 - 50 U/L Final    AST 12 0 - 45 U/L Final         11/1/2018  7:31 PM      Component Results     Component Value Ref Range & Units Status    Lipase 124 73 - 393 U/L Final         11/1/2018  7:00 PM      Component Results     Component Value Ref Range & Units Status    Color Urine Straw  Final    Appearance Urine Clear  Final    Glucose  Urine Negative NEG^Negative mg/dL Final    Bilirubin Urine Negative NEG^Negative Final    Ketones Urine 5 (A) NEG^Negative mg/dL Final    Specific Gravity Urine 1.009 1.003 - 1.035 Final    Blood Urine Small (A) NEG^Negative Final    pH Urine 5.0 5.0 - 7.0 pH Final    Protein Albumin Urine Negative NEG^Negative mg/dL Final    Urobilinogen mg/dL 0.0 0.0 - 2.0 mg/dL Final    Nitrite Urine Negative NEG^Negative Final    Leukocyte Esterase Urine Negative NEG^Negative Final    Source Unspecified Urine  Final    RBC Urine 1 0 - 2 /HPF Final    WBC Urine <1 0 - 5 /HPF Final    Bacteria Urine Few (A) NEG^Negative /HPF Final    Squamous Epithelial /HPF Urine <1 0 - 1 /HPF Final         11/1/2018  7:51 PM      Narrative     CT ABDOMEN AND PELVIS WITH CONTRAST   11/1/2018 7:41 PM     HISTORY: Epigastric pain     TECHNIQUE: 68 mL Isovue -370. CT images of the abdomen and pelvis  following nonionic intravenous contrast. Radiation dose for this scan  was reduced using automated exposure control, adjustment of the mA  and/or kV according to patient size, or iterative reconstruction  technique.    COMPARISON: 3/6/2018    FINDINGS: Small hypodensities in the liver are too small to  characterize, but statistically are likely to be benign and/or  unchanged. No new liver abnormality. The gallbladder, spleen,  pancreas, and adrenal glands are unremarkable. The kidneys appear  normal. No bowel obstruction, free air, or ascites. The appendix is  normal. No abdominal or retroperitoneal lymphadenopathy. No pelvic  lymphadenopathy, mass, or free fluid. The uterus has been removed. No  suspicious bone abnormality.        Impression     IMPRESSION: No acute abnormality of the abdomen or pelvis. No cause  for pain demonstrated.    CUBA GRACIA MD                Thank you for choosing [facility]       Thank you for choosing [facility] for your care. Our goal is always to provide you with excellent care. Hearing back from our patients is one way  we can continue to improve our services. Please take a few minutes to complete the written survey that you may receive in the mail after you visit with us. Thank you!        [employer] Information     [employer] gives you secure access to your electronic health record. If you see a primary care provider, you can also send messages to your care team and make appointments. If you have questions, please call your primary care clinic.  If you do not have a primary care provider, please call [phone] and they will assist you.        Care EveryWhere ID     This is your Care EveryWhere ID. This could be used by other organizations to access your [facility] medical records  [id number]        Equal Access to Services     [facility] : Devin Pierce, miryam moon, mayra tripp, thierry avitia . So [facility] [phone].    ATENCIÓN: Si habla español, tiene a lema disposición servicios gratuitos de asistencia lingüística. Llame al [phone].    We comply with applicable federal civil rights laws and [place] laws. We do not discriminate on the basis of race, color, national origin, age, disability, sex, sexual orientation, or gender identity.            After Visit Summary       This is your record. Keep this with you and show to your community pharmacist(s) and doctor(s) at your next visit.

## 2018-11-01 NOTE — ED PROVIDER NOTES
History     Chief Complaint   Patient presents with     Abdominal Pain     Pt c/o right upper quad abdominal pain since July - worsening pain today     HPI  Luna Rivera is a 48 year old female who s/p TRACI, situational anxiety, sections, GERD, hip impingement syndrome, and hemorrhoids that presents to the ED for abdominal pain. The patient states that since the end of July, she has been experiencing sudden onset of severe epigastric pain. She states that she had a hysterectomy at the end of March. She states that she is followed by Dr. Catrina Jin who prescribed her medication, but was referred to [facility] for more studying. She states that her pain is right under her ribs that is worse with sitting and eating. She states that when she is ambulating, she can not feel the pain. She states that she has made dietary changes that have provided her some relief. She states her pain is not as intense as it was in July, but has generalized pain over her abdomen at times. She states that she has experienced bowel concerns all her life. The patient states that she has been taking Miralax for the last week with no relief.  Unremarkable right upper quadrant ultrasound end of September, last CT scan in March.    The patient denies any fever, chills, nausea, vomiting, diarrhea, chest pains, or shortness of breath.    Problem List:    Patient Active Problem List    Diagnosis Date Noted     S/P TRACI (total abdominal hysterectomy) 2018     Priority: Medium     Situational anxiety 2017     Priority: Medium     Grief reaction 2017     Priority: Medium     Ptosis of eyelid, bilateral 2016     Priority: Medium     Gastroesophageal reflux disease without esophagitis 10/24/2016     Priority: Medium     Hip impingement syndrome, unspecified laterality 11/10/2015     Priority: Medium     Acne 10/05/2011     Priority: Medium     CARDIOVASCULAR SCREENING; LDL GOAL LESS THAN 160 10/31/2010     Priority:  Medium     Family history of breast cancer in mother 2009     Priority: Medium     Hemorrhoids 2007     Priority: Medium     Problem list name updated by automated process. Provider to review       Allergic rhinitis 2005     Priority: Medium     Has had really bad allergies and cannot sleep at night because tichty and scratchy throat and ears. suring the morning she has blocked congested nose. There is no wheezing or cough. There is sneezing. Usually her symptoms are controlled by calritin but since last two weeks the above aymptoms have been there and are severe.    The symptoms are seasonal and worse at home as comparee d to when she is in the city.    She has tried the sudfed which makes her lightheaded and dizzy              Past Medical History:    Past Medical History:   Diagnosis Date     Cardiomyopathy in other diseases classified elsewhere      Contact dermatitis and other eczema, due to unspecified cause      Enteritis due to [place] virus 2002     Herpes simplex without mention of complication      Intramural and subserous leiomyoma of uterus 3/19/2018     Irritable bowel syndrome      PONV (postoperative nausea and vomiting)        Past Surgical History:    Past Surgical History:   Procedure Laterality Date     ARTHROSCOPY KNEE WITH MEDIAL MENISCECTOMY  2014    Procedure: ARTHROSCOPY KNEE WITH MEDIAL MENISCECTOMY;  Surgeon: Alejandro Dodge MD;  Location: [facility]  DELIVERY ONLY  2002     C LIGATE FALLOPIAN TUBE,POSTPARTUM       ESOPHAGOSCOPY, GASTROSCOPY, DUODENOSCOPY (EGD), COMBINED N/A 2016    Procedure: COMBINED ESOPHAGOSCOPY, GASTROSCOPY, DUODENOSCOPY (EGD), BIOPSY SINGLE OR MULTIPLE;  Surgeon: Jose Mora MD;  Location: [facility]      HYSTEROSCOPY, SURGICAL; W/ ENDOMETRIAL ABLATION, ANY METHOD  3/30/10    Novasure ablation     HYSTERECTOMY TOTAL ABDOMINAL, BILATERAL SALPINGO-OOPHORECTOMY, COMBINED Bilateral 3/27/2018    TRACI only; tubes  removed; ovaries left in       Family History:    Family History   Problem Relation Age of Onset     Breast Cancer Mother      age 46     Cancer Mother      Throat, larynx (d/t radiation from breast cancer)tongue cancer 9/2011     Hypertension Father      Cancer Father      lung CA     Neurologic Disorder Maternal Grandmother      Parkinson's     C.A.D. Maternal Grandmother      Thyroid Disease Maternal Grandmother      hyperthyroid     Arthritis Maternal Grandfather      HEART DISEASE Maternal Grandfather      Osteoporosis Paternal Grandmother      HEART DISEASE Paternal Grandfather      Cancer - colorectal No family hx of      Prostate Cancer No family hx of      Cerebrovascular Disease No family hx of      Diabetes No family hx of      Asthma No family hx of        Social History:  Marital Status:   [2]  Social History   Substance Use Topics     Smoking status: Never Smoker     Smokeless tobacco: Never Used     Alcohol use Yes      Comment: rarely        Medications:      acyclovir (ZOVIRAX) 400 MG tablet   augmented betamethasone dipropionate (DIPROLENE-AF) 0.05 % ointment   fluticasone (FLONASE) 50 MCG/ACT spray   hydrocortisone (ANUSOL-HC) 2.5 % cream   ibuprofen (ADVIL/MOTRIN) 400 MG tablet   Omeprazole (PRILOSEC PO)   sucralfate (CARAFATE) 1 GM/10ML suspension   tacrolimus (PROTOPIC) 0.1 % ointment   tacrolimus (PROTOPIC) 0.1 % ointment   tretinoin (RETIN-A) 0.025 % topical gel         Review of Systems   Constitutional: Negative for chills and fever.   HENT: Negative for facial swelling and sore throat.    Eyes: Negative for photophobia.   Respiratory: Negative for shortness of breath.    Cardiovascular: Negative for chest pain.   Gastrointestinal: Positive for abdominal pain.   Genitourinary: Negative for frequency and urgency.   Musculoskeletal: Negative for back pain.   Skin: Negative for rash.   Neurological: Negative for headaches.       Physical Exam   BP: 137/78  Pulse: 88  Temp: 98.3  F (36.8  " C)  Resp: 16  Height: 172.7 cm (5' 8\")  Weight: 63.5 kg (140 lb)  SpO2: 99 %      Physical Exam  Nontoxic appearing no respiratory distress alert and oriented ×3  Head atraumatic normocephalic  No cervical adenopathy neck supple full painless range of motion  Lungs clear to auscultation  Heart regular no murmur  Abdomen soft mild/moderate epigastric right upper quadrant tenderness without guarding or rebound bowel sounds positive no masses or HSM  Strength and sensation grossly intact throughout the extremities, gait and station normal  Speech is fluent, good eye contact, thought processes are rational  Lower extremities without swelling, redness or tenderness  Pedal pulses symmetrical and strong    ED Course     ED Course     Procedures               Critical Care time:  none               Results for orders placed or performed during the hospital encounter of 11/01/18 (from the past 24 hour(s))   CBC with platelets differential   Result Value Ref Range    WBC 7.0 4.0 - 11.0 10e9/L    RBC Count 4.49 3.8 - 5.2 10e12/L    Hemoglobin 14.0 11.7 - 15.7 g/dL    Hematocrit 42.2 35.0 - 47.0 %    MCV 94 78 - 100 fl    MCH 31.2 26.5 - 33.0 pg    MCHC 33.2 31.5 - 36.5 g/dL    RDW 12.7 10.0 - 15.0 %    Platelet Count 240 150 - 450 10e9/L    Diff Method Automated Method     % Neutrophils 75.4 %    % Lymphocytes 19.5 %    % Monocytes 4.0 %    % Eosinophils 0.4 %    % Basophils 0.4 %    % Immature Granulocytes 0.3 %    Nucleated RBCs 0 0 /100    Absolute Neutrophil 5.3 1.6 - 8.3 10e9/L    Absolute Lymphocytes 1.4 0.8 - 5.3 10e9/L    Absolute Monocytes 0.3 0.0 - 1.3 10e9/L    Absolute Eosinophils 0.0 0.0 - 0.7 10e9/L    Absolute Basophils 0.0 0.0 - 0.2 10e9/L    Abs Immature Granulocytes 0.0 0 - 0.4 10e9/L    Absolute Nucleated RBC 0.0    Comprehensive metabolic panel   Result Value Ref Range    Sodium 142 133 - 144 mmol/L    Potassium 3.6 3.4 - 5.3 mmol/L    Chloride 110 (H) 94 - 109 mmol/L    Carbon Dioxide 26 20 - 32 mmol/L    " Anion Gap 6 3 - 14 mmol/L    Glucose 97 70 - 99 mg/dL    Urea Nitrogen 13 7 - 30 mg/dL    Creatinine 0.88 0.52 - 1.04 mg/dL    GFR Estimate 68 >60 mL/min/1.7m2    GFR Estimate If Black 82 >60 mL/min/1.7m2    Calcium 8.5 8.5 - 10.1 mg/dL    Bilirubin Total 0.8 0.2 - 1.3 mg/dL    Albumin 4.0 3.4 - 5.0 g/dL    Protein Total 7.0 6.8 - 8.8 g/dL    Alkaline Phosphatase 40 40 - 150 U/L    ALT 21 0 - 50 U/L    AST 12 0 - 45 U/L   Lipase   Result Value Ref Range    Lipase 124 73 - 393 U/L   UA reflex to Microscopic   Result Value Ref Range    Color Urine Straw     Appearance Urine Clear     Glucose Urine Negative NEG^Negative mg/dL    Bilirubin Urine Negative NEG^Negative    Ketones Urine 5 (A) NEG^Negative mg/dL    Specific Gravity Urine 1.009 1.003 - 1.035    Blood Urine Small (A) NEG^Negative    pH Urine 5.0 5.0 - 7.0 pH    Protein Albumin Urine Negative NEG^Negative mg/dL    Urobilinogen mg/dL 0.0 0.0 - 2.0 mg/dL    Nitrite Urine Negative NEG^Negative    Leukocyte Esterase Urine Negative NEG^Negative    Source Unspecified Urine     RBC Urine 1 0 - 2 /HPF    WBC Urine <1 0 - 5 /HPF    Bacteria Urine Few (A) NEG^Negative /HPF    Squamous Epithelial /HPF Urine <1 0 - 1 /HPF   CT Abdomen Pelvis w Contrast    Narrative    CT ABDOMEN AND PELVIS WITH CONTRAST   11/1/2018 7:41 PM     HISTORY: Epigastric pain     TECHNIQUE: 68 mL Isovue -370. CT images of the abdomen and pelvis  following nonionic intravenous contrast. Radiation dose for this scan  was reduced using automated exposure control, adjustment of the mA  and/or kV according to patient size, or iterative reconstruction  technique.    COMPARISON: 3/6/2018    FINDINGS: Small hypodensities in the liver are too small to  characterize, but statistically are likely to be benign and/or  unchanged. No new liver abnormality. The gallbladder, spleen,  pancreas, and adrenal glands are unremarkable. The kidneys appear  normal. No bowel obstruction, free air, or ascites. The  appendix is  normal. No abdominal or retroperitoneal lymphadenopathy. No pelvic  lymphadenopathy, mass, or free fluid. The uterus has been removed. No  suspicious bone abnormality.      Impression    IMPRESSION: No acute abnormality of the abdomen or pelvis. No cause  for pain demonstrated.    CUBA GRACIA MD       Medications   0.9% sodium chloride BOLUS (0 mLs Intravenous Stopped 11/1/18 2003)     Followed by   sodium chloride 0.9% infusion (not administered)   ketorolac (TORADOL) injection 30 mg (30 mg Intravenous Given 11/1/18 1845)   LORazepam (ATIVAN) injection 0.5 mg (0.5 mg Intravenous Given 11/1/18 1849)   iopamidol (ISOVUE-370) solution 68 mL (68 mLs Intravenous Given 11/1/18 1932)   Saline Flush (57 mLs Intravenous Given 11/1/18 1933)     6:12 PM Patient assessed.   Assessments & Plan (with Medical Decision Making)  48-year-old female right upper quadrant epigastric pain tenderness, right upper quadrant ultrasound is unremarkable into September, CT scan abdomen pelvis undertaken here given chronicity of symptoms, is unremarkable by my read as well as by radiology.  Lab workup is within normal limits.  Recommend follow-up primary care, HIDA scan, GI for upper and lower endoscopy.  Return criteria reviewed.  Usual differential considered.     I have reviewed the nursing notes.    I have reviewed the findings, diagnosis, plan and need for follow up with the patient.       Discharge Medication List as of 11/1/2018  8:03 PM          Final diagnoses:   Abdominal pain, epigastric   This document serves as a record of the services and decisions personally performed and made by Gennaro Godoy MD. It was created on his behalf by Catrina Branch, a trained medical scribe. The creation of this document is based the provider's statements to the medical scribe.  Catrina Branch 6:12 PM 11/1/2018    Provider:   The information in this document, created by the medical scribe for me, accurately reflects the  services I personally performed and the decisions made by me. I have reviewed and approved this document for accuracy prior to leaving the patient care area.  Gennaro Godoy MD 6:12 PM 11/1/2018 11/1/2018   [facility] EMERGENCY DEPARTMENT     Gennaro Godoy MD  11/01/18 2022

## 2018-11-02 NOTE — DISCHARGE INSTRUCTIONS
*Abdominal Pain, Unknown Cause (Female)    The exact cause of your abdominal (stomach) pain is not certain. This does not mean that this is something to worry about, or the right tests were not done. Everyone likes to know the exact cause of the problem, but sometimes with abdominal pain, there is no clear-cut cause, and this could be a good thing. The good news is that your symptoms can be treated, and you will feel better.   Your condition does not seem serious now; however, sometimes the signs of a serious problem may take more time to appear. For this reason, it is important for you to watch for any new symptoms, problems, or worsening of your condition.  Over the next few days, the abdominal pain may come and go, or be continuous. Other common symptoms can include nausea and vomiting. Sometimes it can be difficult to tell if you feel nauseous, you may just feel bad and not associate that feeling with nausea. Constipation, diarrhea, and a fever may go along with the pain.  The pain may continue even if treated correctly over the following days. Depending on how things go, sometimes the cause can become clear and may require further or different treatment. Additional evaluations, medications, or tests may be needed.  Home care  Your health care provider may prescribe medications for pain, symptoms, or an infection.  Follow the health care provider's instructions for taking these medications.  General care    Rest until your next exam. No strenuous activities.    Try to find positions that ease discomfort. A small pillow placed on the abdomen may help relieve pain.    Something warm on your abdomen (such as a heating pad) may help, but be careful not to burn yourself.  Diet    Do not force yourself to eat, especially if having cramps, vomiting, or diarrhea.    Water is important so you do not get dehydrated. Soup may also be good. Sports drinks may also help, especially if they are not too acidic. Make sure you  don't drink sugary drinks as this can make things worse. Take liquids in small amounts. Do not guzzle them.    Caffeine sometimes makes the pain and cramping worse.    Avoid dairy products if you have vomiting or diarrhea.    Don't eat large amounts at a time. Wait a few minutes between bites.    Eat a diet low in fiber (called a low-residue diet). Foods allowed include refined breads, white rice, fruit and vegetable juices without pulp, tender meats. These foods will pass more easily through the intestine.    Avoid fried or fatty foods, dairy, alcohol and spicy foods until your symptoms go away.  Follow-up care  Follow up with your health care provider as instructed, or if your pain does not begin to improve in the next 24 hours.  When to seek medical care  Seek prompt medical care if any of the following occur:    Pain gets worse or moves to the right lower abdomen    New or worsening vomiting or diarrhea    Swelling of the abdomen    Unable to pass stool for more than three days    New fever over 101  F (38.3 C), or rising fever    Blood in vomit or bowel movements (dark red or black color)    Jaundice (yellow color of eyes and skin)    Weakness, dizziness    Chest, arm, back, neck or jaw pain    Unexpected vaginal bleeding or missed period  Call 911  Call emergency services if any of the following occur:    Trouble breathing    Confusion    Fainting or loss of consciousness    Rapid heart rate    Seizure    [id number] [facility], [address] [place] [address], [place] [address]. All rights reserved. This information is not intended as a substitute for professional medical care. Always follow your healthcare professional's instructions.      Follow-up with primary care regarding further evaluation, upper and lower endoscopy, functional gallbladder study.

## 2018-11-02 NOTE — ED NOTES
Pt presents to ED with c/o upper abd pain since July.  States today she felt more panicked about it and was concerned.  Pt states pain worse after eating.  Has tried multiple medications, per MD recommendation with no relief. Has had US in past no CT.  A&Ox4.

## 2018-11-05 ENCOUNTER — TELEPHONE (OUTPATIENT)
Dept: FAMILY MEDICINE | Facility: CLINIC | Age: 48
End: 2018-11-05

## 2018-11-05 NOTE — TELEPHONE ENCOUNTER
Patient has [facility] Hepatobiliary scan orders in place per Dr. Godoy on 11-1-18, gave contact number to schedule, told to call back with questions.    LONG Edwards

## 2018-11-05 NOTE — TELEPHONE ENCOUNTER
Reason for Call: Request for an order or referral:    Order or referral being requested: functional gallbladder test    Date needed: as soon as possible    Has the patient been seen by the PCP for this problem? YES    Additional comments: in ED 11/1    Phone number Patient can be reached at:  Home number on file [phone] (home)    Best Time:  any    Can we leave a detailed message on this number?  YES    Call taken on 11/5/2018 at 3:24 PM by Sruthi Salgado

## 2018-11-08 ENCOUNTER — HOSPITAL ENCOUNTER (OUTPATIENT)
Dept: NUCLEAR MEDICINE | Facility: CLINIC | Age: 48
Setting detail: NUCLEAR MEDICINE
Discharge: HOME | End: 2018-11-08
Attending: EMERGENCY MEDICINE | Admitting: EMERGENCY MEDICINE
Payer: COMMERCIAL

## 2018-11-08 DIAGNOSIS — R10.13 ABDOMINAL PAIN, EPIGASTRIC: ICD-10-CM

## 2018-11-08 PROCEDURE — 34300033 ZZH RX 343: Performed by: EMERGENCY MEDICINE

## 2018-11-08 PROCEDURE — A9537 TC99M MEBROFENIN: HCPCS | Performed by: EMERGENCY MEDICINE

## 2018-11-08 PROCEDURE — 25000128 H RX IP 250 OP 636: Performed by: EMERGENCY MEDICINE

## 2018-11-08 PROCEDURE — 78227 HEPATOBIL SYST IMAGE W/DRUG: CPT

## 2018-11-08 RX ORDER — KIT FOR THE PREPARATION OF TECHNETIUM TC 99M MEBROFENIN 45 MG/10ML
5.2 INJECTION, POWDER, LYOPHILIZED, FOR SOLUTION INTRAVENOUS ONCE
Status: COMPLETED | OUTPATIENT
Start: 2018-11-08 | End: 2018-11-08

## 2018-11-08 RX ADMIN — SODIUM CHLORIDE 1.3 MCG: 9 INJECTION, SOLUTION INTRAMUSCULAR; INTRAVENOUS; SUBCUTANEOUS at 09:22

## 2018-11-08 RX ADMIN — MEBROFENIN 5.2 MILLICURIE: 45 INJECTION, POWDER, LYOPHILIZED, FOR SOLUTION INTRAVENOUS at 08:14

## 2018-11-14 ENCOUNTER — MYC MEDICAL ADVICE (OUTPATIENT)
Dept: FAMILY MEDICINE | Facility: CLINIC | Age: 48
End: 2018-11-14

## 2018-11-14 DIAGNOSIS — R10.13 ABDOMINAL PAIN, EPIGASTRIC: Primary | ICD-10-CM

## 2018-11-14 DIAGNOSIS — K21.9 GASTROESOPHAGEAL REFLUX DISEASE WITHOUT ESOPHAGITIS: ICD-10-CM

## 2018-11-14 DIAGNOSIS — R10.11 RUQ ABDOMINAL PAIN: ICD-10-CM

## 2018-11-14 NOTE — TELEPHONE ENCOUNTER
Routed to provider.  Please see MyChart message from pt asking to get endoscopy/colonoscopy here at [facility]?  I have cued up the orders.  Paula Vargas RN

## 2018-12-18 ENCOUNTER — ANESTHESIA EVENT (OUTPATIENT)
Dept: GASTROENTEROLOGY | Facility: CLINIC | Age: 48
End: 2018-12-18
Payer: COMMERCIAL

## 2018-12-18 NOTE — ANESTHESIA PREPROCEDURE EVALUATION
Anesthesia Pre-Procedure Evaluation    Patient: Luna Rivera   MRN: [id number] : 1970          Preoperative Diagnosis: right sided abdominal pain    diagnostic    Procedure(s):  COLONOSCOPY  COMBINED ESOPHAGOSCOPY, GASTROSCOPY, DUODENOSCOPY (EGD)    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     Contact dermatitis and other eczema, due to unspecified cause      Enteritis due to [place] virus 2002     Herpes simplex without mention of complication     Last outbreak      Intramural and subserous leiomyoma of uterus 3/19/2018     Irritable bowel syndrome      PONV (postoperative nausea and vomiting)      Past Surgical History:   Procedure Laterality Date     ARTHROSCOPY KNEE WITH MEDIAL MENISCECTOMY  2014    Procedure: ARTHROSCOPY KNEE WITH MEDIAL MENISCECTOMY;  Surgeon: Alejandro Dodge MD;  Location: [facility]       DELIVERY ONLY  2002     C LIGATE FALLOPIAN TUBE,POSTPARTUM       ESOPHAGOSCOPY, GASTROSCOPY, DUODENOSCOPY (EGD), COMBINED N/A 2016    Procedure: COMBINED ESOPHAGOSCOPY, GASTROSCOPY, DUODENOSCOPY (EGD), BIOPSY SINGLE OR MULTIPLE;  Surgeon: Jose Mora MD;  Location: [facility] HYSTEROSCOPY, SURGICAL; W/ ENDOMETRIAL ABLATION, ANY METHOD  3/30/10    Novasure ablation     HYSTERECTOMY TOTAL ABDOMINAL, BILATERAL SALPINGO-OOPHORECTOMY, COMBINED Bilateral 3/27/2018    TRACI only; tubes removed; ovaries left in       Anesthesia Evaluation     . Pt has had prior anesthetic. Type: Regional, MAC and General    History of anesthetic complications   - PONV        ROS/MED HX    ENT/Pulmonary:     (+)allergic rhinitis, , . .    Neurologic:  - neg neurologic ROS     Cardiovascular: Comment: Cardiomyopathy - neg cardiovascular ROS       METS/Exercise Tolerance:  >4 METS   Hematologic:  - neg hematologic  ROS       Musculoskeletal:  - neg musculoskeletal ROS      "  GI/Hepatic:     (+) GERD Asymptomatic on medication, Other GI/Hepatic chronic right side abdominal pain      Renal/Genitourinary:  - ROS Renal section negative       Endo:  - neg endo ROS       Psychiatric:     (+) psychiatric history anxiety      Infectious Disease:  - neg infectious disease ROS       Malignancy:      - no malignancy   Other:    - neg other ROS                      Physical Exam  Normal systems: cardiovascular, pulmonary and dental    Airway   Mallampati: I  TM distance: >3 FB  Neck ROM: full    Dental     Cardiovascular       Pulmonary             Lab Results   Component Value Date    WBC 7.0 11/01/2018    HGB 14.0 11/01/2018    HCT 42.2 11/01/2018     11/01/2018     11/01/2018    POTASSIUM 3.6 11/01/2018    CHLORIDE 110 (H) 11/01/2018    CO2 26 11/01/2018    BUN 13 11/01/2018    CR 0.88 11/01/2018    GLC 97 11/01/2018    MITCHELL 8.5 11/01/2018    MAG 2.1 09/26/2007    ALBUMIN 4.0 11/01/2018    PROTTOTAL 7.0 11/01/2018    ALT 21 11/01/2018    AST 12 11/01/2018    ALKPHOS 40 11/01/2018    BILITOTAL 0.8 11/01/2018    LIPASE 124 11/01/2018    AMYLASE 40 05/06/2015    TSH 1.71 10/24/2016    T4 0.99 10/24/2016    T3 119 09/26/2007    HCG Negative 03/06/2018       Preop Vitals  BP Readings from Last 3 Encounters:   11/01/18 137/78   10/24/18 108/69   08/08/18 131/75    Pulse Readings from Last 3 Encounters:   11/01/18 88   10/24/18 95   08/08/18 99      Resp Readings from Last 3 Encounters:   11/01/18 16   10/24/18 16   08/02/18 16    SpO2 Readings from Last 3 Encounters:   11/01/18 99%   10/24/18 98%   08/08/18 99%      Temp Readings from Last 1 Encounters:   11/01/18 36.8  C (98.3  F) (Oral)    Ht Readings from Last 1 Encounters:   11/01/18 1.727 m (5' 8\")      Wt Readings from Last 1 Encounters:   11/01/18 63.5 kg (140 lb)    Estimated body mass index is 21.29 kg/m  as calculated from the following:    Height as of 11/1/18: 1.727 m (5' 8\").    Weight as of 11/1/18: 63.5 kg (140 lb). "       Anesthesia Plan      History & Physical Review  History and physical reviewed and following examination; no interval change.    ASA Status:  2 .    NPO Status:  > 4 hours    Plan for MAC Maintenance will be Balanced.  Reason for MAC:  Deep or markedly invasive procedure (G8)         Postoperative Care      Consents  Anesthetic plan, risks, benefits and alternatives discussed with:  Patient and Spouse..                 Bebo Coates CRNA, APRN CRNA

## 2018-12-20 ENCOUNTER — ANESTHESIA (OUTPATIENT)
Dept: GASTROENTEROLOGY | Facility: CLINIC | Age: 48
End: 2018-12-20
Payer: COMMERCIAL

## 2018-12-20 ENCOUNTER — HOSPITAL ENCOUNTER (OUTPATIENT)
Facility: CLINIC | Age: 48
Discharge: HOME | End: 2018-12-20
Attending: SURGERY | Admitting: SURGERY
Payer: COMMERCIAL

## 2018-12-20 VITALS
TEMPERATURE: 98.4 F | RESPIRATION RATE: 16 BRPM | BODY MASS INDEX: 21.22 KG/M2 | SYSTOLIC BLOOD PRESSURE: 114 MMHG | HEART RATE: 76 BPM | WEIGHT: 140 LBS | DIASTOLIC BLOOD PRESSURE: 79 MMHG | HEIGHT: 68 IN | OXYGEN SATURATION: 99 %

## 2018-12-20 LAB
COLONOSCOPY: NORMAL
UPPER GI ENDOSCOPY: NORMAL

## 2018-12-20 PROCEDURE — G0121 COLON CA SCRN NOT HI RSK IND: HCPCS | Performed by: SURGERY

## 2018-12-20 PROCEDURE — 25000125 ZZHC RX 250: Performed by: NURSE ANESTHETIST, CERTIFIED REGISTERED

## 2018-12-20 PROCEDURE — 25000128 H RX IP 250 OP 636: Performed by: NURSE ANESTHETIST, CERTIFIED REGISTERED

## 2018-12-20 PROCEDURE — 43235 EGD DIAGNOSTIC BRUSH WASH: CPT | Performed by: SURGERY

## 2018-12-20 PROCEDURE — 25000125 ZZHC RX 250: Performed by: SURGERY

## 2018-12-20 PROCEDURE — 37000008 ZZH ANESTHESIA TECHNICAL FEE, 1ST 30 MIN: Performed by: SURGERY

## 2018-12-20 PROCEDURE — 45378 DIAGNOSTIC COLONOSCOPY: CPT | Performed by: SURGERY

## 2018-12-20 PROCEDURE — 43235 EGD DIAGNOSTIC BRUSH WASH: CPT | Mod: 51 | Performed by: SURGERY

## 2018-12-20 PROCEDURE — 25000128 H RX IP 250 OP 636: Performed by: SURGERY

## 2018-12-20 RX ORDER — ONDANSETRON 2 MG/ML
4 INJECTION INTRAMUSCULAR; INTRAVENOUS
Status: DISCONTINUED | OUTPATIENT
Start: 2018-12-20 | End: 2018-12-20 | Stop reason: HOSPADM

## 2018-12-20 RX ORDER — LIDOCAINE HYDROCHLORIDE 10 MG/ML
INJECTION, SOLUTION INFILTRATION; PERINEURAL PRN
Status: DISCONTINUED | OUTPATIENT
Start: 2018-12-20 | End: 2018-12-20

## 2018-12-20 RX ORDER — LIDOCAINE 40 MG/G
CREAM TOPICAL
Status: DISCONTINUED | OUTPATIENT
Start: 2018-12-20 | End: 2018-12-20 | Stop reason: HOSPADM

## 2018-12-20 RX ORDER — PROPOFOL 10 MG/ML
INJECTION, EMULSION INTRAVENOUS CONTINUOUS PRN
Status: DISCONTINUED | OUTPATIENT
Start: 2018-12-20 | End: 2018-12-20

## 2018-12-20 RX ORDER — PROPOFOL 10 MG/ML
INJECTION, EMULSION INTRAVENOUS PRN
Status: DISCONTINUED | OUTPATIENT
Start: 2018-12-20 | End: 2018-12-20

## 2018-12-20 RX ORDER — GLYCOPYRROLATE 0.2 MG/ML
INJECTION, SOLUTION INTRAMUSCULAR; INTRAVENOUS PRN
Status: DISCONTINUED | OUTPATIENT
Start: 2018-12-20 | End: 2018-12-20

## 2018-12-20 RX ORDER — SODIUM CHLORIDE, SODIUM LACTATE, POTASSIUM CHLORIDE, CALCIUM CHLORIDE 600; 310; 30; 20 MG/100ML; MG/100ML; MG/100ML; MG/100ML
INJECTION, SOLUTION INTRAVENOUS CONTINUOUS
Status: DISCONTINUED | OUTPATIENT
Start: 2018-12-20 | End: 2018-12-20 | Stop reason: HOSPADM

## 2018-12-20 RX ADMIN — GLYCOPYRROLATE 0.2 MG: 0.2 INJECTION, SOLUTION INTRAMUSCULAR; INTRAVENOUS at 13:01

## 2018-12-20 RX ADMIN — LIDOCAINE HYDROCHLORIDE 100 MG: 10 INJECTION, SOLUTION INFILTRATION; PERINEURAL at 13:02

## 2018-12-20 RX ADMIN — LIDOCAINE HYDROCHLORIDE 1 ML: 10 INJECTION, SOLUTION EPIDURAL; INFILTRATION; INTRACAUDAL; PERINEURAL at 12:45

## 2018-12-20 RX ADMIN — PROPOFOL 200 MCG/KG/MIN: 10 INJECTION, EMULSION INTRAVENOUS at 13:01

## 2018-12-20 RX ADMIN — SODIUM CHLORIDE, POTASSIUM CHLORIDE, SODIUM LACTATE AND CALCIUM CHLORIDE: 600; 310; 30; 20 INJECTION, SOLUTION INTRAVENOUS at 12:44

## 2018-12-20 RX ADMIN — PROPOFOL 70 MG: 10 INJECTION, EMULSION INTRAVENOUS at 13:02

## 2018-12-20 RX ADMIN — PROPOFOL 30 MG: 10 INJECTION, EMULSION INTRAVENOUS at 13:08

## 2018-12-20 ASSESSMENT — MIFFLIN-ST. JEOR: SCORE: 1308.54

## 2018-12-20 NOTE — H&P
48 year old year old female here for upper and lower endoscopy for abdominal pain and screening        Patient Active Problem List   Diagnosis     Allergic rhinitis     Hemorrhoids     Family history of breast cancer in mother     CARDIOVASCULAR SCREENING; LDL GOAL LESS THAN 160     Acne     Hip impingement syndrome, unspecified laterality     Gastroesophageal reflux disease without esophagitis     Ptosis of eyelid, bilateral     Situational anxiety     Grief reaction     S/P TRACI (total abdominal hysterectomy)       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     Contact dermatitis and other eczema, due to unspecified cause      Enteritis due to [place] virus 2002     Herpes simplex without mention of complication     Last outbreak      Intramural and subserous leiomyoma of uterus 3/19/2018     Irritable bowel syndrome      PONV (postoperative nausea and vomiting)        Past Surgical History:   Procedure Laterality Date     ARTHROSCOPY KNEE WITH MEDIAL MENISCECTOMY  2014    Procedure: ARTHROSCOPY KNEE WITH MEDIAL MENISCECTOMY;  Surgeon: Alejandro Dodge MD;  Location: [facility]  DELIVERY ONLY  2002     C LIGATE FALLOPIAN TUBE,POSTPARTUM       ESOPHAGOSCOPY, GASTROSCOPY, DUODENOSCOPY (EGD), COMBINED N/A 2016    Procedure: COMBINED ESOPHAGOSCOPY, GASTROSCOPY, DUODENOSCOPY (EGD), BIOPSY SINGLE OR MULTIPLE;  Surgeon: Jose Mora MD;  Location: [facility]      HYSTEROSCOPY, SURGICAL; W/ ENDOMETRIAL ABLATION, ANY METHOD  3/30/10    Novasure ablation     HYSTERECTOMY TOTAL ABDOMINAL, BILATERAL SALPINGO-OOPHORECTOMY, COMBINED Bilateral 3/27/2018    TRACI only; tubes removed; ovaries left in       Family History   Problem Relation Age of Onset     Breast Cancer Mother         age 46     Cancer Mother         Throat, larynx (d/t radiation from breast cancer)tongue cancer 2011      Hypertension Father      Cancer Father         lung CA     Neurologic Disorder Maternal Grandmother         Parkinson's     C.A.D. Maternal Grandmother      Thyroid Disease Maternal Grandmother         hyperthyroid     Arthritis Maternal Grandfather      Heart Disease Maternal Grandfather      Osteoporosis Paternal Grandmother      Heart Disease Paternal Grandfather      Cancer - colorectal No family hx of      Prostate Cancer No family hx of      Cerebrovascular Disease No family hx of      Diabetes No family hx of      Asthma No family hx of        No current outpatient medications on file.       Allergies   Allergen Reactions     Compazine Fatigue     Neurological s/s-can't wake up     Ancef [Cefazolin Sodium] Hives       Pt reports that  has never smoked. she has never used smokeless tobacco. She reports that she drinks alcohol. She reports that she does not use drugs.    Exam:    Awake, Alert OX3  Lungs - CTA bilaterally  CV - RRR, no murmurs, distal pulses intact  Abd - soft, non-distended, non-tender, +BS  Extr - No cyanosis or edema    A/P 48 year old year old female in need of upper and lower endoscopy for abdominal pain and screening. Risks, benefits, alternatives, and complications were discussed including the possibility of perforation and the patient agreed to proceed.    Jose Mora MD

## 2018-12-20 NOTE — ANESTHESIA POSTPROCEDURE EVALUATION
Patient: Luna Rivera    Procedure(s):  COLONOSCOPY  COMBINED ESOPHAGOSCOPY, GASTROSCOPY, DUODENOSCOPY (EGD)    Diagnosis:right sided abdominal pain    diagnostic  Diagnosis Additional Information: No value filed.    Anesthesia Type:  No value filed.    Note:  Anesthesia Post Evaluation    Patient location during evaluation: Phase 2  Patient participation: Able to fully participate in evaluation  Level of consciousness: awake  Pain management: adequate  Airway patency: patent  Cardiovascular status: acceptable and hemodynamically stable  Respiratory status: acceptable, room air and spontaneous ventilation  Hydration status: acceptable  PONV: none     Anesthetic complications: None          Last vitals:  Vitals:    12/20/18 1226   BP: (!) 117/92   Resp: 18   Temp: 36.9  C (98.4  F)   SpO2: 100%         Electronically Signed By: MARLON Gonzalez CRNA  December 20, 2018  1:06 PM

## 2018-12-20 NOTE — ANESTHESIA CARE TRANSFER NOTE
Patient: Luna Rivera    Procedure(s):  COLONOSCOPY  COMBINED ESOPHAGOSCOPY, GASTROSCOPY, DUODENOSCOPY (EGD)    Diagnosis: right sided abdominal pain    diagnostic  Diagnosis Additional Information: No value filed.    Anesthesia Type:   No value filed.     Note:  Airway :Room Air  Patient transferred to:Phase II        Vitals: (Last set prior to Anesthesia Care Transfer)    CRNA VITALS  12/20/2018 1236 - 12/20/2018 1306      12/20/2018             Pulse:  93    Ht Rate:  0  (Abnormal)     SpO2:  100 %                Electronically Signed By: MARLON Gonzalez CRNA  December 20, 2018  1:06 PM

## 2018-12-23 DIAGNOSIS — L70.9 ACNE, UNSPECIFIED ACNE TYPE: ICD-10-CM

## 2018-12-24 ENCOUNTER — TELEPHONE (OUTPATIENT)
Dept: DERMATOLOGY | Facility: CLINIC | Age: 48
End: 2018-12-24

## 2018-12-24 DIAGNOSIS — L30.9 CHRONIC DERMATITIS OF HANDS: ICD-10-CM

## 2018-12-24 DIAGNOSIS — B07.0 PLANTAR WART OF LEFT FOOT: ICD-10-CM

## 2018-12-24 DIAGNOSIS — L30.1 DYSHIDROTIC ECZEMA: ICD-10-CM

## 2018-12-24 RX ORDER — TRETINOIN 0.25 MG/G
GEL TOPICAL
Qty: 45 G | Refills: 7 | Status: SHIPPED | OUTPATIENT
Start: 2018-12-24 | End: 2019-10-17

## 2018-12-24 NOTE — TELEPHONE ENCOUNTER
"Requested Prescriptions   Pending Prescriptions Disp Refills     tretinoin (RETIN-A) 0.025 % external gel [Pharmacy Med Name: TRETINOIN 0.025%    GEL]  Last Written Prescription Date:  8/17/17  Last Fill Quantity: 45g,  # refills: 12   Last office visit: 8/2/2018 with prescribing provider:  Davin   Future Office Visit:   Next 5 appointments (look out 90 days)    Jan 02, 2019 10:40 AM CST  [facility] Visit with Luna Tijerina PA-C  [facility] [address] [address]  [phone]           12     Sig: APPLY EXTERNALLY AT BEDTIME    Topical Acne Medications Protocol Passed - 12/23/2018  6:30 AM       Passed - Patient is 12 years of age or older       Passed - Recent (12 mo) or future (30 days) visit within the authorizing provider's specialty    Patient had office visit in the last 12 months or has a visit in the next 30 days with authorizing provider or within the authorizing provider's specialty.  See \"Patient Info\" tab in inbasket, or \"Choose Columns\" in Meds & Orders section of the refill encounter.                "

## 2018-12-24 NOTE — TELEPHONE ENCOUNTER
"Requested Prescriptions   Pending Prescriptions Disp Refills     tacrolimus (PROTOPIC) 0.1 % external ointment  Last Written Prescription Date:  10/24/18  Last Fill Quantity: 60g,  # refills: 0   Last office visit: 10/24/2018 with prescribing provider:  Davin   Future Office Visit:   Next 5 appointments (look out 90 days)    Jan 02, 2019 10:40 AM CST  [facility] Visit with Luna Tijerina PA-C  [facility] [address] [address]  [phone]          60 g 0     Sig: Apply topically 2 times daily    Topical Steroids and Nonsteroidals Protocol Passed - 12/24/2018 10:50 AM       Passed - Patient is age 6 or older       Passed - Authorizing prescriber's most recent note related to this medication read.    If refill request is for ophthalmic use, please forward request to provider for approval.         Passed - High potency steroid not ordered       Passed - Recent (12 mo) or future (30 days) visit within the authorizing provider's specialty    Patient had office visit in the last 12 months or has a visit in the next 30 days with authorizing provider or within the authorizing provider's specialty.  See \"Patient Info\" tab in inbasket, or \"Choose Columns\" in Meds & Orders section of the refill encounter.                "

## 2018-12-26 ENCOUNTER — TELEPHONE (OUTPATIENT)
Dept: DERMATOLOGY | Facility: CLINIC | Age: 48
End: 2018-12-26

## 2018-12-26 RX ORDER — TACROLIMUS 1 MG/G
OINTMENT TOPICAL 2 TIMES DAILY
Qty: 60 G | Refills: 0 | Status: SHIPPED | OUTPATIENT
Start: 2018-12-26 | End: 2022-06-07

## 2018-12-26 NOTE — TELEPHONE ENCOUNTER
Last refilled by Derm.  Will route to Derm pool for refill consideration.    Thank you!  Smita ORTIZ RN

## 2018-12-26 NOTE — LETTER
[facility]  [address] [address]  Phone: [phone]    December 28, 2018        [employer] [place] [facility]  Attention: [employer]  [address] [address] [address]   [place], [place] [address]  Member ID: [id number]    RE:Luna Rivera  [id number] [facility] [address]          To whom it may concern:  This letter is written on behalf of Luna Rivera to appeal denial of protopic to treat chronic dyshidrotic eczema. Patient has used betamethasone cream since July 2018 which does help with her chronic hand eczema but it is causing some thinning of skin. She has used protopic in the past with good success and no side effect. Please consider covering protopic to treat patient dyshidrotic eczema.       Sincerely,      Luna Roldan PA-C

## 2018-12-26 NOTE — TELEPHONE ENCOUNTER
Prior Authorization Retail Medication Request    Medication/Dose: tacrolimus  ICD code (if different than what is on RX):    Chronic dermatitis of hands [L30.9]       Dyshidrotic eczema [L30.1]       Plantar wart of left foot [B07.0]          Previously Tried and Failed:    Rationale:      Insurance Name:  [facility]  Insurance ID:  [id number]      Pharmacy Information (if different than what is on RX)  Name:  [facility]  Phone:

## 2018-12-27 NOTE — TELEPHONE ENCOUNTER
PRIOR AUTHORIZATION DENIED    Medication: tacrolimus-DENIED    Denial Date: 12/27/2018    Denial Rational: Patient needs to have tried/failed a topical corticosteroid or topical corticosteroid combination product in the past 120 days.        Appeal Information:     If provider would like to appeal please provide a letter of medical necessity stating why formulary alternatives would not be clinically appropriate for patient and route back to the PA team.

## 2018-12-27 NOTE — TELEPHONE ENCOUNTER
Central Prior Authorization Team   Phone: [phone]      PA Initiation    Medication: tacrolimus-Initiated  Insurance Company: [employer] - Phone [phone] Fax [phone]  Pharmacy Filling the Rx: [facility] [address] [place] [address] - [address]  Filling Pharmacy Phone: [phone]  Filling Pharmacy Fax:    Start Date: 12/27/2018

## 2018-12-28 NOTE — TELEPHONE ENCOUNTER
Medication Appeal Initiation    We have initiated an appeal for the requested medication:  Medication: tacrolimus-APPEAL INITIATED  Appeal Start Date:  12/28/2018  Insurance Company: [employer] - Phone [phone] Fax [phone]  Comments:       Manually faxed original denial and letter of medical necessity to [phone].

## 2018-12-31 NOTE — TELEPHONE ENCOUNTER
Received call from Tona at [facility], appeal does not meet federal guidelines to be reviewed as urgent.  It can take up to 30 days.  Case ID: [id number].

## 2019-01-02 ENCOUNTER — OFFICE VISIT (OUTPATIENT)
Dept: DERMATOLOGY | Facility: CLINIC | Age: 49
End: 2019-01-02
Payer: COMMERCIAL

## 2019-01-02 VITALS — OXYGEN SATURATION: 100 % | HEART RATE: 77 BPM | SYSTOLIC BLOOD PRESSURE: 99 MMHG | DIASTOLIC BLOOD PRESSURE: 66 MMHG

## 2019-01-02 DIAGNOSIS — B07.0 PLANTAR WART: Primary | ICD-10-CM

## 2019-01-02 PROCEDURE — 17110 DESTRUCTION B9 LES UP TO 14: CPT | Performed by: PHYSICIAN ASSISTANT

## 2019-01-02 NOTE — NURSING NOTE
Chief Complaint   Patient presents with     Derm Problem     wart       Vitals:    01/02/19 1039   BP: 99/66   Pulse: 77   SpO2: 100%     Wt Readings from Last 1 Encounters:   12/20/18 63.5 kg (140 lb)       Tatianna Aranda LPN.................1/2/2019

## 2019-01-02 NOTE — LETTER
2019         RE: Luna Rivera  [address]  [place] [facility] [address]        Dear Colleague,    Thank you for referring your patient, Luna Rivera, to the [facility]. Please see a copy of my visit note below.    Luna Rivera is a 48 year old year old female patient here today for recheck wart on foot.  Patient reports that wart seems improved, unsure if it is gone. Patient has no other skin complaints today.  Remainder of the HPI, Meds, PMH, Allergies, FH, and SH was reviewed in chart.   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     Contact dermatitis and other eczema, due to unspecified cause      Enteritis due to [place] virus 2002     Herpes simplex without mention of complication     Last outbreak      Intramural and subserous leiomyoma of uterus 3/19/2018     Irritable bowel syndrome      PONV (postoperative nausea and vomiting)        Past Surgical History:   Procedure Laterality Date     ARTHROSCOPY KNEE WITH MEDIAL MENISCECTOMY  2014    Procedure: ARTHROSCOPY KNEE WITH MEDIAL MENISCECTOMY;  Surgeon: Alejandro Dodge MD;  Location: [facility]       DELIVERY ONLY  2002     C LIGATE FALLOPIAN TUBE,POSTPARTUM       COLONOSCOPY N/A 2018    Procedure: COLONOSCOPY;  Surgeon: Jose Mora MD;  Location: [facility]     ESOPHAGOSCOPY, GASTROSCOPY, DUODENOSCOPY (EGD), COMBINED N/A 2016    Procedure: COMBINED ESOPHAGOSCOPY, GASTROSCOPY, DUODENOSCOPY (EGD), BIOPSY SINGLE OR MULTIPLE;  Surgeon: Jose Mora MD;  Location: [facility]     ESOPHAGOSCOPY, GASTROSCOPY, DUODENOSCOPY (EGD), COMBINED N/A 2018    Procedure: COMBINED ESOPHAGOSCOPY, GASTROSCOPY, DUODENOSCOPY (EGD);  Surgeon: Jose Mora MD;  Location: [facility]     HC HYSTEROSCOPY, SURGICAL; W/ ENDOMETRIAL ABLATION, ANY METHOD  3/30/10    Novasure ablation     HYSTERECTOMY TOTAL  ABDOMINAL, BILATERAL SALPINGO-OOPHORECTOMY, COMBINED Bilateral 3/27/2018    TRACI only; tubes removed; ovaries left in        Family History   Problem Relation Age of Onset     Breast Cancer Mother         age 46     Cancer Mother         Throat, larynx (d/t radiation from breast cancer)tongue cancer 9/2011     Hypertension Father      Cancer Father         lung CA     Neurologic Disorder Maternal Grandmother         Parkinson's     C.A.D. Maternal Grandmother      Thyroid Disease Maternal Grandmother         hyperthyroid     Arthritis Maternal Grandfather      Heart Disease Maternal Grandfather      Osteoporosis Paternal Grandmother      Heart Disease Paternal Grandfather      Cancer - colorectal No family hx of      Prostate Cancer No family hx of      Cerebrovascular Disease No family hx of      Diabetes No family hx of      Asthma No family hx of        Social History     Socioeconomic History     Marital status:      Spouse name: Not on file     Number of children: 2     Years of education: Not on file     Highest education level: Not on file   Social Needs     Financial resource strain: Not on file     Food insecurity - worry: Not on file     Food insecurity - inability: Not on file     Transportation needs - medical: Not on file     Transportation needs - non-medical: Not on file   Occupational History     Occupation: Stay at Home Mom     Employer: [employer]     Occupation:  prn   Tobacco Use     Smoking status: Never Smoker     Smokeless tobacco: Never Used   Substance and Sexual Activity     Alcohol use: Yes     Comment: rarely     Drug use: No     Sexual activity: Yes     Partners: Male     Birth control/protection: Female Surgical     Comment: BTL   Other Topics Concern     Parent/sibling w/ CABG, MI or angioplasty before 65F 55M? No   Social History Narrative    Home with kids                           Outpatient Encounter Medications as of 1/2/2019   Medication Sig  Dispense Refill     acyclovir (ZOVIRAX) 400 MG tablet Take 1 tablet (400 mg) by mouth 2 times daily 180 tablet 3     augmented betamethasone dipropionate (DIPROLENE-AF) 0.05 % ointment Apply twice daily as needed. 50 g 2     fluticasone (FLONASE) 50 MCG/ACT spray USE TWO SPRAY(S) IN EACH NOSTRIL ONCE DAILY 3 Bottle 2     hydrocortisone (ANUSOL-HC) 2.5 % cream Place rectally 2 times daily 28.35 g 1     ibuprofen (ADVIL/MOTRIN) 400 MG tablet Take 1 tablet (400 mg) by mouth every 6 hours as needed for moderate pain 40 tablet 1     Omeprazole (PRILOSEC PO) Take 20 mg by mouth daily as needed        tacrolimus (PROTOPIC) 0.1 % external ointment Apply topically 2 times daily 60 g 0     tretinoin (RETIN-A) 0.025 % external gel APPLY EXTERNALLY AT BEDTIME 45 g 7     No facility-administered encounter medications on file as of 1/2/2019.              Review Of Systems  Skin: As above  Eyes: negative  Ears/Nose/Throat: negative  Respiratory: No shortness of breath, dyspnea on exertion, cough, or hemoptysis  Cardiovascular: negative  Gastrointestinal: negative  Genitourinary: negative  Musculoskeletal: negative  Neurologic: negative  Psychiatric: negative  Hematologic/Lymphatic/Immunologic: negative  Endocrine: negative      O:   NAD, WDWN, Alert & Oriented, Mood & Affect wnl, Vitals stable   Here today alone   BP 99/66   Pulse 77   LMP 02/25/2018   SpO2 100%    General appearance normal   Vitals stable   Alert, oriented and in no acute distress  ? Verrucous papule on foot, disruption of skin lines    Eyes: Conjunctivae/lids:Normal     ENT: Lips: normal    MSK:Normal    Pulm: Breathing Normal    Neuro/Psych: Orientation:Normal; Mood/Affect:Normal  A/P:  1. Plantar wart on foot  ? If still present (scar tissue vs residual wart), she would like to proceed with one more cryo treatment.   LN2:  Treated with LN2 for 5s for 1-2 cycles. Warned risks of blistering, pain, pigment change, scarring, and incomplete resolution.  Advised  patient to return if lesions do not completely resolve.  Wound care sheet given.      Again, thank you for allowing me to participate in the care of your patient.        Sincerely,        Luna Roldan PA-C

## 2019-01-07 NOTE — PROGRESS NOTES
Luna Rivera is a 48 year old year old female patient here today for recheck wart on foot.  Patient reports that wart seems improved, unsure if it is gone. Patient has no other skin complaints today.  Remainder of the HPI, Meds, PMH, Allergies, FH, and SH was reviewed in chart.   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     Contact dermatitis and other eczema, due to unspecified cause      Enteritis due to [place] virus 2002     Herpes simplex without mention of complication     Last outbreak      Intramural and subserous leiomyoma of uterus 3/19/2018     Irritable bowel syndrome      PONV (postoperative nausea and vomiting)        Past Surgical History:   Procedure Laterality Date     ARTHROSCOPY KNEE WITH MEDIAL MENISCECTOMY  2014    Procedure: ARTHROSCOPY KNEE WITH MEDIAL MENISCECTOMY;  Surgeon: Alejandro Dodge MD;  Location: [facility]       DELIVERY ONLY  ,      C LIGATE FALLOPIAN TUBE,POSTPARTUM       COLONOSCOPY N/A 2018    Procedure: COLONOSCOPY;  Surgeon: Jose Mora MD;  Location: [facility]     ESOPHAGOSCOPY, GASTROSCOPY, DUODENOSCOPY (EGD), COMBINED N/A 2016    Procedure: COMBINED ESOPHAGOSCOPY, GASTROSCOPY, DUODENOSCOPY (EGD), BIOPSY SINGLE OR MULTIPLE;  Surgeon: Jose Mora MD;  Location: [facility]     ESOPHAGOSCOPY, GASTROSCOPY, DUODENOSCOPY (EGD), COMBINED N/A 2018    Procedure: COMBINED ESOPHAGOSCOPY, GASTROSCOPY, DUODENOSCOPY (EGD);  Surgeon: Jose Mora MD;  Location: [facility]     HC HYSTEROSCOPY, SURGICAL; W/ ENDOMETRIAL ABLATION, ANY METHOD  3/30/10    Novasure ablation     HYSTERECTOMY TOTAL ABDOMINAL, BILATERAL SALPINGO-OOPHORECTOMY, COMBINED Bilateral 3/27/2018    TRACI only; tubes removed; ovaries left in        Family History   Problem Relation Age of Onset     Breast Cancer Mother         age 46     Cancer Mother         Throat, larynx  (d/t radiation from breast cancer)tongue cancer 9/2011     Hypertension Father      Cancer Father         lung CA     Neurologic Disorder Maternal Grandmother         Parkinson's     C.A.D. Maternal Grandmother      Thyroid Disease Maternal Grandmother         hyperthyroid     Arthritis Maternal Grandfather      Heart Disease Maternal Grandfather      Osteoporosis Paternal Grandmother      Heart Disease Paternal Grandfather      Cancer - colorectal No family hx of      Prostate Cancer No family hx of      Cerebrovascular Disease No family hx of      Diabetes No family hx of      Asthma No family hx of        Social History     Socioeconomic History     Marital status:      Spouse name: Not on file     Number of children: 2     Years of education: Not on file     Highest education level: Not on file   Social Needs     Financial resource strain: Not on file     Food insecurity - worry: Not on file     Food insecurity - inability: Not on file     Transportation needs - medical: Not on file     Transportation needs - non-medical: Not on file   Occupational History     Occupation: Stay at Home Mom     Employer: [employer]     Occupation:  prn   Tobacco Use     Smoking status: Never Smoker     Smokeless tobacco: Never Used   Substance and Sexual Activity     Alcohol use: Yes     Comment: rarely     Drug use: No     Sexual activity: Yes     Partners: Male     Birth control/protection: Female Surgical     Comment: BTL   Other Topics Concern     Parent/sibling w/ CABG, MI or angioplasty before 65F 55M? No   Social History Narrative    Home with kids                           Outpatient Encounter Medications as of 1/2/2019   Medication Sig Dispense Refill     acyclovir (ZOVIRAX) 400 MG tablet Take 1 tablet (400 mg) by mouth 2 times daily 180 tablet 3     augmented betamethasone dipropionate (DIPROLENE-AF) 0.05 % ointment Apply twice daily as needed. 50 g 2     fluticasone (FLONASE) 50 MCG/ACT  spray USE TWO SPRAY(S) IN EACH NOSTRIL ONCE DAILY 3 Bottle 2     hydrocortisone (ANUSOL-HC) 2.5 % cream Place rectally 2 times daily 28.35 g 1     ibuprofen (ADVIL/MOTRIN) 400 MG tablet Take 1 tablet (400 mg) by mouth every 6 hours as needed for moderate pain 40 tablet 1     Omeprazole (PRILOSEC PO) Take 20 mg by mouth daily as needed        tacrolimus (PROTOPIC) 0.1 % external ointment Apply topically 2 times daily 60 g 0     tretinoin (RETIN-A) 0.025 % external gel APPLY EXTERNALLY AT BEDTIME 45 g 7     No facility-administered encounter medications on file as of 1/2/2019.              Review Of Systems  Skin: As above  Eyes: negative  Ears/Nose/Throat: negative  Respiratory: No shortness of breath, dyspnea on exertion, cough, or hemoptysis  Cardiovascular: negative  Gastrointestinal: negative  Genitourinary: negative  Musculoskeletal: negative  Neurologic: negative  Psychiatric: negative  Hematologic/Lymphatic/Immunologic: negative  Endocrine: negative      O:   NAD, WDWN, Alert & Oriented, Mood & Affect wnl, Vitals stable   Here today alone   BP 99/66   Pulse 77   LMP 02/25/2018   SpO2 100%    General appearance normal   Vitals stable   Alert, oriented and in no acute distress  ? Verrucous papule on foot, disruption of skin lines    Eyes: Conjunctivae/lids:Normal     ENT: Lips: normal    MSK:Normal    Pulm: Breathing Normal    Neuro/Psych: Orientation:Normal; Mood/Affect:Normal  A/P:  1. Plantar wart on foot  ? If still present (scar tissue vs residual wart), she would like to proceed with one more cryo treatment.   LN2:  Treated with LN2 for 5s for 1-2 cycles. Warned risks of blistering, pain, pigment change, scarring, and incomplete resolution.  Advised patient to return if lesions do not completely resolve.  Wound care sheet given.

## 2019-01-15 ENCOUNTER — HOSPITAL ENCOUNTER (OUTPATIENT)
Dept: MAMMOGRAPHY | Facility: CLINIC | Age: 49
Discharge: HOME | End: 2019-01-15
Attending: NURSE PRACTITIONER | Admitting: NURSE PRACTITIONER
Payer: COMMERCIAL

## 2019-01-15 DIAGNOSIS — Z12.31 VISIT FOR SCREENING MAMMOGRAM: ICD-10-CM

## 2019-01-15 PROCEDURE — 77067 SCR MAMMO BI INCL CAD: CPT

## 2019-01-25 NOTE — TELEPHONE ENCOUNTER
Called and spoke with Donato at provider services to check on the status of appeal.  It was approved on 1/22/19, asked to have approval faxed over.  States it can take up to 10 business to get a fax sent.

## 2019-01-30 NOTE — TELEPHONE ENCOUNTER
Called and spoke with Bernardino at provider services and she is going to send another request to have the paperwork faxed.

## 2019-02-07 NOTE — TELEPHONE ENCOUNTER
Spoke with Silva at provider services and she can not see the approval date range, was transferred to the [facility] appeal line, but due to long hold times I was transferred to a confidential voicemail.  Left a message for a callback ASAP so I can at least get the approval date range since a fax has not been received.

## 2019-02-08 NOTE — TELEPHONE ENCOUNTER
MEDICATION APPEAL APPROVED    Medication: tacrolimus-APPEAL APPROVED  Authorization Effective Date:    Authorization Expiration Date:    Approved Dose/Quantity:   Reference #:     Insurance Company: [facility] [employer] - Phone [phone] Fax [phone]  Expected CoPay:       CoPay Card Available:      Foundation Assistance Needed:    Which Pharmacy is filling the prescription (Not needed for infusion/clinic administered): [facility] [address] [place] [address] - [address]    Provider services at [facility] hasn't returned my call but call pharmacy to say appeal was approved and they tried to process but the patient's insurance has been terminated with [facility].

## 2019-03-16 DIAGNOSIS — J30.9 ALLERGIC RHINITIS: ICD-10-CM

## 2019-03-17 NOTE — TELEPHONE ENCOUNTER
"Requested Prescriptions   Pending Prescriptions Disp Refills     fluticasone (FLONASE) 50 MCG/ACT nasal spray [Pharmacy Med Name: FLUTICASONE 50MCG   SPR]  2     Sig: USE 2 SPRAY(S) IN EACH NOSTRIL ONCE DAILY    Inhaled Steroids Protocol Passed - 3/16/2019 11:12 AM       Passed - Patient is age 12 or older       Passed - Recent (12 mo) or future (30 days) visit within the authorizing provider's specialty    Patient had office visit in the last 12 months or has a visit in the next 30 days with authorizing provider or within the authorizing provider's specialty.  See \"Patient Info\" tab in inbasket, or \"Choose Columns\" in Meds & Orders section of the refill encounter.             Passed - Medication is active on med list        Last Written Prescription Date:  5/16/18  Last Fill Quantity: 3,  # refills: 2   Last office visit: 8/2/2018 with prescribing provider:     Future Office Visit:      "

## 2019-03-19 RX ORDER — FLUTICASONE PROPIONATE 50 MCG
SPRAY, SUSPENSION (ML) NASAL
Qty: 16 G | Refills: 2 | Status: SHIPPED | OUTPATIENT
Start: 2019-03-19 | End: 2019-10-17

## 2019-03-19 NOTE — TELEPHONE ENCOUNTER
Prescription approved per [facility] refill protocol. Dana ELMORE RN

## 2019-10-03 ENCOUNTER — HEALTH MAINTENANCE LETTER (OUTPATIENT)
Age: 49
End: 2019-10-03

## 2019-10-11 ENCOUNTER — TELEPHONE (OUTPATIENT)
Dept: FAMILY MEDICINE | Facility: CLINIC | Age: 49
End: 2019-10-11

## 2019-10-11 DIAGNOSIS — B00.9 HERPES SIMPLEX VIRUS (HSV) INFECTION: ICD-10-CM

## 2019-10-11 NOTE — TELEPHONE ENCOUNTER
"Requested Prescriptions   Pending Prescriptions Disp Refills     acyclovir (ZOVIRAX) 400 MG tablet [Pharmacy Med Name: ACYCLOVIR 400MG     Last Written Prescription Date:  03/22/18  Last Fill Quantity: 180,  # refills: 3   Last office visit: 8/2/2018 with prescribing provider:  Catrina Jin   Future Office Visit:      TAB] 180 tablet 3     Sig: TAKE 1 TABLET BY MOUTH TWICE DAILY       Antivirals for Herpes Protocol Failed - 10/11/2019 10:34 AM        Failed - Recent (12 mo) or future (30 days) visit within the authorizing provider's specialty     Patient has had an office visit with the authorizing provider or a provider within the authorizing providers department within the previous 12 mos or has a future within next 30 days. See \"Patient Info\" tab in inbasket, or \"Choose Columns\" in Meds & Orders section of the refill encounter.          Passed - Patient is age 12 or older        Passed - Medication is active on med list        Passed - Normal serum creatinine on file in past 12 months     Recent Labs   Lab Test 11/01/18  1848   CR 0.88               "

## 2019-10-11 NOTE — LETTER
October 15, 2019      Luna Rivera  [id number] [facility] [address]        Dear Luna,     Your acyclovir medication is being filled for 1 time refill only due to:  you are due for an office visit before further refills.     Please call [phone] to arrange your appt.    Sincerely,        Catrina Jin NP/ sukhi isbell

## 2019-10-15 RX ORDER — ACYCLOVIR 400 MG/1
TABLET ORAL
Qty: 60 TABLET | Refills: 0 | Status: SHIPPED | OUTPATIENT
Start: 2019-10-15 | End: 2021-03-12

## 2019-10-15 RX ORDER — ACYCLOVIR 400 MG/1
400 TABLET ORAL 2 TIMES DAILY
Qty: 60 TABLET | Refills: 0 | Status: CANCELLED | OUTPATIENT
Start: 2019-10-15

## 2019-10-15 NOTE — TELEPHONE ENCOUNTER
It does not appear medication was sent to pharmacy please resend.     acyclovir (ZOVIRAX) 400 MG tablet 60 tablet 0 10/15/2019  No   Sig: TAKE 1 TABLET BY MOUTH TWICE DAILY   Sent to pharmacy as: acyclovir (ZOVIRAX) 400 MG tablet   Class: E-Prescribe   Notes to Pharmacy: Needs recheck, please advise to call and schedule an appt. [phone].   Order: [id number]   E-Prescribing Status: Transmission to pharmacy in progress (10/15/2019 10:47 AM RO Rene

## 2019-10-15 NOTE — TELEPHONE ENCOUNTER
Refilled x 1 month per protocol.     Tried to call her, it has been more than a year since she was seen.    Left message to call us back.   CSS, when she calls, please remind her she is due for a clinic visit with Catrina or one of her partners. (last seen 8/28/18)   Thanks,       Annie Piña RNC

## 2019-10-15 NOTE — TELEPHONE ENCOUNTER
E-scribe is down.  Verbal refill called to pharmacist.    Attempted to reach pt but no answer.  Reminder letter sent to pt.    Paula Vargas RN

## 2019-10-17 ENCOUNTER — OFFICE VISIT (OUTPATIENT)
Dept: FAMILY MEDICINE | Facility: CLINIC | Age: 49
End: 2019-10-17
Payer: COMMERCIAL

## 2019-10-17 ENCOUNTER — TELEPHONE (OUTPATIENT)
Dept: FAMILY MEDICINE | Facility: CLINIC | Age: 49
End: 2019-10-17

## 2019-10-17 VITALS
HEIGHT: 68 IN | DIASTOLIC BLOOD PRESSURE: 78 MMHG | OXYGEN SATURATION: 98 % | WEIGHT: 150.8 LBS | BODY MASS INDEX: 22.85 KG/M2 | HEART RATE: 82 BPM | SYSTOLIC BLOOD PRESSURE: 118 MMHG

## 2019-10-17 DIAGNOSIS — L70.9 ACNE, UNSPECIFIED ACNE TYPE: ICD-10-CM

## 2019-10-17 DIAGNOSIS — B00.9 HERPES SIMPLEX VIRUS (HSV) INFECTION: ICD-10-CM

## 2019-10-17 DIAGNOSIS — K21.9 GASTROESOPHAGEAL REFLUX DISEASE WITHOUT ESOPHAGITIS: ICD-10-CM

## 2019-10-17 DIAGNOSIS — Z13.6 CARDIOVASCULAR SCREENING; LDL GOAL LESS THAN 160: ICD-10-CM

## 2019-10-17 DIAGNOSIS — F41.8 SITUATIONAL ANXIETY: Primary | ICD-10-CM

## 2019-10-17 DIAGNOSIS — K64.4 EXTERNAL HEMORRHOIDS: ICD-10-CM

## 2019-10-17 DIAGNOSIS — J30.2 SEASONAL ALLERGIC RHINITIS, UNSPECIFIED TRIGGER: ICD-10-CM

## 2019-10-17 DIAGNOSIS — K58.2 IRRITABLE BOWEL SYNDROME WITH BOTH CONSTIPATION AND DIARRHEA: ICD-10-CM

## 2019-10-17 PROBLEM — F43.21 GRIEF REACTION: Status: RESOLVED | Noted: 2017-08-17 | Resolved: 2019-10-17

## 2019-10-17 PROBLEM — F43.20 GRIEF REACTION: Status: RESOLVED | Noted: 2017-08-17 | Resolved: 2019-10-17

## 2019-10-17 LAB
ALBUMIN SERPL-MCNC: 3.9 G/DL (ref 3.4–5)
ALP SERPL-CCNC: 42 U/L (ref 40–150)
ALT SERPL W P-5'-P-CCNC: 18 U/L (ref 0–50)
ANION GAP SERPL CALCULATED.3IONS-SCNC: 5 MMOL/L (ref 3–14)
AST SERPL W P-5'-P-CCNC: 16 U/L (ref 0–45)
BILIRUB SERPL-MCNC: 0.7 MG/DL (ref 0.2–1.3)
BUN SERPL-MCNC: 11 MG/DL (ref 7–30)
CALCIUM SERPL-MCNC: 8.7 MG/DL (ref 8.5–10.1)
CHLORIDE SERPL-SCNC: 104 MMOL/L (ref 94–109)
CHOLEST SERPL-MCNC: 195 MG/DL
CO2 SERPL-SCNC: 27 MMOL/L (ref 20–32)
CREAT SERPL-MCNC: 0.76 MG/DL (ref 0.52–1.04)
ERYTHROCYTE [DISTWIDTH] IN BLOOD BY AUTOMATED COUNT: 13.1 % (ref 10–15)
GFR SERPL CREATININE-BSD FRML MDRD: >90 ML/MIN/{1.73_M2}
GLUCOSE SERPL-MCNC: 86 MG/DL (ref 70–99)
HCT VFR BLD AUTO: 43.7 % (ref 35–47)
HDLC SERPL-MCNC: 82 MG/DL
HGB BLD-MCNC: 14.8 G/DL (ref 11.7–15.7)
LDLC SERPL CALC-MCNC: 101 MG/DL
MCH RBC QN AUTO: 30.9 PG (ref 26.5–33)
MCHC RBC AUTO-ENTMCNC: 33.9 G/DL (ref 31.5–36.5)
MCV RBC AUTO: 91 FL (ref 78–100)
NONHDLC SERPL-MCNC: 113 MG/DL
PLATELET # BLD AUTO: 226 10E9/L (ref 150–450)
POTASSIUM SERPL-SCNC: 3.6 MMOL/L (ref 3.4–5.3)
PROT SERPL-MCNC: 7.1 G/DL (ref 6.8–8.8)
RBC # BLD AUTO: 4.79 10E12/L (ref 3.8–5.2)
SODIUM SERPL-SCNC: 136 MMOL/L (ref 133–144)
TRIGL SERPL-MCNC: 62 MG/DL
WBC # BLD AUTO: 5.9 10E9/L (ref 4–11)

## 2019-10-17 PROCEDURE — 36415 COLL VENOUS BLD VENIPUNCTURE: CPT | Performed by: NURSE PRACTITIONER

## 2019-10-17 PROCEDURE — 80061 LIPID PANEL: CPT | Performed by: NURSE PRACTITIONER

## 2019-10-17 PROCEDURE — 85027 COMPLETE CBC AUTOMATED: CPT | Performed by: NURSE PRACTITIONER

## 2019-10-17 PROCEDURE — 99214 OFFICE O/P EST MOD 30 MIN: CPT | Performed by: NURSE PRACTITIONER

## 2019-10-17 PROCEDURE — 80053 COMPREHEN METABOLIC PANEL: CPT | Performed by: NURSE PRACTITIONER

## 2019-10-17 RX ORDER — FLUTICASONE PROPIONATE 50 MCG
SPRAY, SUSPENSION (ML) NASAL
Qty: 16 G | Refills: 3 | Status: SHIPPED | OUTPATIENT
Start: 2019-10-17 | End: 2020-11-12

## 2019-10-17 RX ORDER — TRETINOIN 0.25 MG/G
GEL TOPICAL
Qty: 45 G | Refills: 7 | Status: SHIPPED | OUTPATIENT
Start: 2019-10-17 | End: 2022-10-04

## 2019-10-17 RX ORDER — ACYCLOVIR 400 MG/1
400 TABLET ORAL 2 TIMES DAILY
Qty: 60 TABLET | Refills: 0 | Status: CANCELLED | OUTPATIENT
Start: 2019-10-17

## 2019-10-17 RX ORDER — ACYCLOVIR 400 MG/1
400 TABLET ORAL DAILY PRN
Qty: 60 TABLET | Refills: 1 | Status: SHIPPED | OUTPATIENT
Start: 2019-10-17 | End: 2019-10-27

## 2019-10-17 ASSESSMENT — MIFFLIN-ST. JEOR: SCORE: 1357.52

## 2019-10-17 ASSESSMENT — PAIN SCALES - GENERAL: PAINLEVEL: NO PAIN (0)

## 2019-10-17 NOTE — RESULT ENCOUNTER NOTE
All of your lab results are normal.  Cholesterol looks great!    Please notify patient of results.  COURTNEY Curiel

## 2019-10-17 NOTE — PROGRESS NOTES
Subjective     Luna Rivera is a 48 year old female who presents to clinic today for the following health issues:    HPI     Chief Complaint   Patient presents with     Refill Request     PRN medication refills, acyclovir and topicals. She is taking as prescribed, denies side effects.      Imm/Inj     declined flu shot     Depression and Anxiety Follow-Up    How are you doing with your depression since your last visit? Improved.    How are you doing with your anxiety since your last visit?  No change    Are you having other symptoms that might be associated with depression or anxiety? No    Have you had a significant life event? No     Do you have any concerns with your use of alcohol or other drugs? No    Social History     Tobacco Use     Smoking status: Never Smoker     Smokeless tobacco: Never Used   Substance Use Topics     Alcohol use: Yes     Comment: rarely     Drug use: No     PHQ 8/17/2017   PHQ-9 Total Score 7   Q9: Thoughts of better off dead/self-harm past 2 weeks Not at all     CARIDAD-7 SCORE 12/13/2011 8/17/2017   Total Score 0 -   Total Score - 11     No flowsheet data found.  No flowsheet data found.  In the past two weeks have you had thoughts of suicide or self-harm?  No.    Do you have concerns about your personal safety or the safety of others?   No    Suicide Assessment Five-step Evaluation and Treatment (SAFE-T)      How many servings of fruits and vegetables do you eat daily?  2-3    On average, how many sweetened beverages do you drink each day (soda, juice, sweet tea, etc)?   2    How many days per week do you miss taking your medication? 0    Tapered off Lexapro a few years ago and doing well.  No panic attacks or anxiety concerns.    Concern - Refill other medications - Acyclovir, Retin - A  Reports history of HSV - takes Acyclovir 400 mg twice daily without any reported outbreaks/flares.  Cut back to 400 mg daily     Retin - A for acne - tolerating well - no skin concerns or side  effects.  Acne has improved.    Therapies Tried and outcome: none    Patient Active Problem List   Diagnosis     Allergic rhinitis     Hemorrhoids     Family history of breast cancer in mother     CARDIOVASCULAR SCREENING; LDL GOAL LESS THAN 160     Acne     Gastroesophageal reflux disease without esophagitis     Ptosis of eyelid, bilateral     Situational anxiety     S/P TRACI (total abdominal hysterectomy)     Past Surgical History:   Procedure Laterality Date     ARTHROSCOPY KNEE WITH MEDIAL MENISCECTOMY  2014    Procedure: ARTHROSCOPY KNEE WITH MEDIAL MENISCECTOMY;  Surgeon: Alejandro Dodge MD;  Location: [facility]  DELIVERY ONLY  2002     C LIGATE FALLOPIAN TUBE,POSTPARTUM       COLONOSCOPY N/A 2018    Procedure: COLONOSCOPY;  Surgeon: Jose Mora MD;  Location: [facility]     ESOPHAGOSCOPY, GASTROSCOPY, DUODENOSCOPY (EGD), COMBINED N/A 2016    Procedure: COMBINED ESOPHAGOSCOPY, GASTROSCOPY, DUODENOSCOPY (EGD), BIOPSY SINGLE OR MULTIPLE;  Surgeon: Jose Mora MD;  Location: [facility]     ESOPHAGOSCOPY, GASTROSCOPY, DUODENOSCOPY (EGD), COMBINED N/A 2018    Procedure: COMBINED ESOPHAGOSCOPY, GASTROSCOPY, DUODENOSCOPY (EGD);  Surgeon: Jose Mora MD;  Location: [facility]     HC HYSTEROSCOPY, SURGICAL; W/ ENDOMETRIAL ABLATION, ANY METHOD  3/30/10    Novasure ablation     HYSTERECTOMY TOTAL ABDOMINAL, BILATERAL SALPINGO-OOPHORECTOMY, COMBINED Bilateral 3/27/2018    TRACI only; tubes removed; ovaries left in       Social History     Tobacco Use     Smoking status: Never Smoker     Smokeless tobacco: Never Used   Substance Use Topics     Alcohol use: Yes     Comment: rarely     Family History   Problem Relation Age of Onset     Breast Cancer Mother         age 46     Cancer Mother         Throat, larynx (d/t radiation from breast cancer)tongue cancer 2011     Hypertension Father      Cancer Father         lung CA     Neurologic Disorder Maternal Grandmother          Parkinson's     C.A.D. Maternal Grandmother      Thyroid Disease Maternal Grandmother         hyperthyroid     Arthritis Maternal Grandfather      Heart Disease Maternal Grandfather      Osteoporosis Paternal Grandmother      Heart Disease Paternal Grandfather      Cancer - colorectal No family hx of      Prostate Cancer No family hx of      Cerebrovascular Disease No family hx of      Diabetes No family hx of      Asthma No family hx of          Current Outpatient Medications   Medication Sig Dispense Refill     acyclovir (ZOVIRAX) 400 MG tablet Take 1 tablet (400 mg) by mouth daily as needed (HSV infection - history) 60 tablet 1     fluticasone (FLONASE) 50 MCG/ACT nasal spray USE 2 SPRAY(S) IN EACH NOSTRIL ONCE DAILY 16 g 3     hydrocortisone (ANUSOL-HC) 2.5 % cream Place rectally 2 times daily 28.35 g 1     tretinoin (RETIN-A) 0.025 % external gel APPLY EXTERNALLY AT BEDTIME 45 g 7     acyclovir (ZOVIRAX) 400 MG tablet TAKE 1 TABLET BY MOUTH TWICE DAILY (Patient not taking: Reported on 10/17/2019) 60 tablet 0     augmented betamethasone dipropionate (DIPROLENE-AF) 0.05 % ointment Apply twice daily as needed. (Patient not taking: Reported on 10/17/2019) 50 g 2     ibuprofen (ADVIL/MOTRIN) 400 MG tablet Take 1 tablet (400 mg) by mouth every 6 hours as needed for moderate pain (Patient not taking: Reported on 10/17/2019) 40 tablet 1     Omeprazole (PRILOSEC PO) Take 20 mg by mouth daily as needed        tacrolimus (PROTOPIC) 0.1 % external ointment Apply topically 2 times daily (Patient not taking: Reported on 10/17/2019) 60 g 0     Allergies   Allergen Reactions     Compazine Fatigue     Neurological s/s-can't wake up     Ancef [Cefazolin Sodium] Hives     Recent Labs   Lab Test 11/01/18  1848 03/28/18  0557  10/24/16  1214 05/06/15  0858  05/22/14  1056 02/27/13  0945   LDL  --   --   --   --   --   --  92 134*   HDL  --   --   --   --   --   --  69 63   TRIG  --   --   --   --   --   --  72 63   ALT 21  --   --   " --  18  --   --   --    CR 0.88 0.79   < > 0.84 0.84   < >  --   --    GFRESTIMATED 68 78   < > 73 73   < >  --   --    GFRESTBLACK 82 >90   < > 88 88   < >  --   --    POTASSIUM 3.6 3.7   < > 4.1 3.9   < >  --   --    TSH  --   --   --  1.71  --   --  2.09  --     < > = values in this interval not displayed.      BP Readings from Last 3 Encounters:   10/17/19 118/78   01/02/19 99/66   12/20/18 114/79    Wt Readings from Last 3 Encounters:   10/17/19 68.4 kg (150 lb 12.8 oz)   12/20/18 63.5 kg (140 lb)   11/01/18 63.5 kg (140 lb)                      Reviewed and updated as needed this visit by Provider         Review of Systems   ROS COMP: Constitutional, HEENT, cardiovascular, pulmonary, GI, , musculoskeletal, neuro, skin, endocrine and psych systems are negative, except as otherwise noted.  POS for irritable bowel with both diarrhea and constipation - history of hemorrhoids uses hydrocortisone cream with good relief.  On strict diet which helps above. Cramping only occasionally prior to BM.      Objective    /78   Pulse 82   Ht 1.727 m (5' 8\")   Wt 68.4 kg (150 lb 12.8 oz)   LMP 02/25/2018   SpO2 98%   BMI 22.93 kg/m    Body mass index is 22.93 kg/m .  Physical Exam   GENERAL: healthy, alert and no distress  NECK: no adenopathy, no asymmetry, masses, or scars and thyroid normal to palpation  RESP: lungs clear to auscultation - no rales, rhonchi or wheezes  CV: regular rate and rhythm, normal S1 S2, no S3 or S4, no murmur, click or rub, no peripheral edema and peripheral pulses strong  ABDOMEN: soft, nontender, no hepatosplenomegaly, no masses and bowel sounds normal  MS: no gross musculoskeletal defects noted, no edema    Diagnostic Test Results:  Labs reviewed in Epic        Assessment & Plan     1. Situational anxiety   Improved - off medication.    2. Herpes simplex virus (HSV) infection  The risks, benefits and treatment options of prescribed medications or other treatments have been discussed " with the patient. The patient verbalized their understanding and should call or follow up if no improvement or if they develop further problems.  Advised tapering off Acyclovir and restarting only if flare occurs.  Labs today due to daily use.    - acyclovir (ZOVIRAX) 400 MG tablet; Take 1 tablet (400 mg) by mouth daily as needed (HSV infection - history)  Dispense: 60 tablet; Refill: 1  - CBC with platelets; Future  - Comprehensive metabolic panel; Future  - Comprehensive metabolic panel  - CBC with platelets    3. External hemorrhoids     - hydrocortisone (ANUSOL-HC) 2.5 % cream; Place rectally 2 times daily  Dispense: 28.35 g; Refill: 1    4. Acne, unspecified acne type  Improved.    - tretinoin (RETIN-A) 0.025 % external gel; APPLY EXTERNALLY AT BEDTIME  Dispense: 45 g; Refill: 7    5. Irritable bowel syndrome with both constipation and diarrhea  Given AVS  - CBC with platelets; Future  - Comprehensive metabolic panel; Future  - Comprehensive metabolic panel  - CBC with platelets    6. Gastroesophageal reflux disease without esophagitis       7. CARDIOVASCULAR SCREENING; LDL GOAL LESS THAN 160     - Lipid panel reflex to direct LDL Fasting; Future  - Lipid panel reflex to direct LDL Fasting    8. Seasonal allergic rhinitis, unspecified trigger     - fluticasone (FLONASE) 50 MCG/ACT nasal spray; USE 2 SPRAY(S) IN EACH NOSTRIL ONCE DAILY  Dispense: 16 g; Refill: 3       Patient Instructions   Irritable bowel syndrome - this is a chronic/longstanding change in the bowel.  There is no specific test for this and treatment involves good general bowel health.  If any concerns for irritable bowel syndrome you should work on increasing exercise as much as possible.  Adding more fiber - either natural by more whole grains, fruits and veggies, or by fiber supplement.  Increase water to at least 2 liters (64 oz) daily.  You should also work on stress management which can have a bad effect on the stomach.    Try to avoid  high fat foods, and avoid fast food completely.  You may use stool softeners for any constipation or immodium for loose stools as needed.   If you are doing all these things and don't notice any improvement over 1-2 months, or for any worsening of symptoms, including any new blood in the stools, return for further evaluation    COURTNEY Curiel      Patient Education     Acyclovir tablets or capsules  Brand Name: Zovirax  What is this medicine?  ACYCLOVIR (ay SYE kloe veer) is an antiviral medicine. It is used to treat or prevent infections caused by certain kinds of viruses. Examples of these infections include herpes and shingles. This medicine will not cure herpes.  How should I use this medicine?  Take this medicine by mouth with a glass of water. Follow the directions on the prescription label. You can take it with or without food. Take your medicine at regular intervals. Do not take your medicine more often than directed. Take all of your medicine as directed even if you think your are better. Do not skip doses or stop your medicine early.  Talk to your pediatrician regarding the use of this medicine in children. While this drug may be prescribed for selected conditions, precautions do apply.  What side effects may I notice from receiving this medicine?  Side effects that you should report to your doctor or health care professional as soon as possible:    allergic reactions like skin rash, itching or hives, swelling of the face, lips, or tongue    chest pain    confusion, hallucinations, tremor    dark urine    increased sensitivity to the sun    redness, blistering, peeling or loosening of the skin, including inside the mouth    seizures    trouble passing urine or change in the amount of urine    unusual bleeding or bruising, or pinpoint red spots on the skin    unusually weak or tired    yellowing of the eyes or skin  Side effects that usually do not require medical attention (report to your doctor or  health care professional if they continue or are bothersome):    diarrhea    fever    headache    nausea, vomiting    stomach upset  What may interact with this medicine?    probenecid    What if I miss a dose?  If you miss a dose, take it as soon as you can. If it is almost time for your next dose, take only that dose. Do not take double or extra doses.  Where should I keep my medicine?  Keep out of the reach of children.  Store at room temperature between 15 and 25 degrees C (59 and 77 degrees F). Throw away any unused medicine after the expiration date.  What should I tell my health care provider before I take this medicine?  They need to know if you have any of these conditions:    kidney disease    an unusual or allergic reaction to acyclovir, ganciclovir, valacyclovir, other medicines, foods, dyes, or preservatives    pregnant or trying to get pregnant    breast-feeding  What should I watch for while using this medicine?  Tell your doctor or health care professional if your symptoms do not improve. This medicine works best when started very early in the course of an infection. Begin treatment at the first signs of infection.  Drink 6 to 8 glasses of water or fluids every day while you are taking this medicine. This will help prevent side effects.  You can still pass chickenpox, shingles, or herpes to another person even while you are taking this medicine. Avoid contact with others as directed. Genital herpes is a sexually transmitted disease. Talk to your doctor about how to stop the spread of infection.  NOTE:This sheet is a summary. It may not cover all possible information. If you have questions about this medicine, talk to your doctor, pharmacist, or health care provider. Copyright  2019 Elsevier               Return in about 1 year (around 10/17/2020) for Physical Exam.    Catrina Jin NP  [facility]

## 2019-10-17 NOTE — PATIENT INSTRUCTIONS
Irritable bowel syndrome - this is a chronic/longstanding change in the bowel.  There is no specific test for this and treatment involves good general bowel health.  If any concerns for irritable bowel syndrome you should work on increasing exercise as much as possible.  Adding more fiber - either natural by more whole grains, fruits and veggies, or by fiber supplement.  Increase water to at least 2 liters (64 oz) daily.  You should also work on stress management which can have a bad effect on the stomach.    Try to avoid high fat foods, and avoid fast food completely.  You may use stool softeners for any constipation or immodium for loose stools as needed.   If you are doing all these things and don't notice any improvement over 1-2 months, or for any worsening of symptoms, including any new blood in the stools, return for further evaluation    COURTNEY Curiel      Patient Education     Acyclovir tablets or capsules  Brand Name: Zovirax  What is this medicine?  ACYCLOVIR (ay SYE kloe veer) is an antiviral medicine. It is used to treat or prevent infections caused by certain kinds of viruses. Examples of these infections include herpes and shingles. This medicine will not cure herpes.  How should I use this medicine?  Take this medicine by mouth with a glass of water. Follow the directions on the prescription label. You can take it with or without food. Take your medicine at regular intervals. Do not take your medicine more often than directed. Take all of your medicine as directed even if you think your are better. Do not skip doses or stop your medicine early.  Talk to your pediatrician regarding the use of this medicine in children. While this drug may be prescribed for selected conditions, precautions do apply.  What side effects may I notice from receiving this medicine?  Side effects that you should report to your doctor or health care professional as soon as possible:    allergic reactions like skin  rash, itching or hives, swelling of the face, lips, or tongue    chest pain    confusion, hallucinations, tremor    dark urine    increased sensitivity to the sun    redness, blistering, peeling or loosening of the skin, including inside the mouth    seizures    trouble passing urine or change in the amount of urine    unusual bleeding or bruising, or pinpoint red spots on the skin    unusually weak or tired    yellowing of the eyes or skin  Side effects that usually do not require medical attention (report to your doctor or health care professional if they continue or are bothersome):    diarrhea    fever    headache    nausea, vomiting    stomach upset  What may interact with this medicine?    probenecid    What if I miss a dose?  If you miss a dose, take it as soon as you can. If it is almost time for your next dose, take only that dose. Do not take double or extra doses.  Where should I keep my medicine?  Keep out of the reach of children.  Store at room temperature between 15 and 25 degrees C (59 and 77 degrees F). Throw away any unused medicine after the expiration date.  What should I tell my health care provider before I take this medicine?  They need to know if you have any of these conditions:    kidney disease    an unusual or allergic reaction to acyclovir, ganciclovir, valacyclovir, other medicines, foods, dyes, or preservatives    pregnant or trying to get pregnant    breast-feeding  What should I watch for while using this medicine?  Tell your doctor or health care professional if your symptoms do not improve. This medicine works best when started very early in the course of an infection. Begin treatment at the first signs of infection.  Drink 6 to 8 glasses of water or fluids every day while you are taking this medicine. This will help prevent side effects.  You can still pass chickenpox, shingles, or herpes to another person even while you are taking this medicine. Avoid contact with others as  directed. Genital herpes is a sexually transmitted disease. Talk to your doctor about how to stop the spread of infection.  NOTE:This sheet is a summary. It may not cover all possible information. If you have questions about this medicine, talk to your doctor, pharmacist, or health care provider. Copyright  2019 Elsevier

## 2019-10-18 NOTE — TELEPHONE ENCOUNTER
PA Initiation    Medication: tretinoin  Insurance Company: [employer] - Phone [phone] Fax [phone]  Pharmacy Filling the Rx: [facility] [address] [place] [address] - [facility] [address]  Filling Pharmacy Phone: [phone]  Filling Pharmacy Fax:    Start Date: 10/18/2019    [facility] Prior Authorization Team   Phone: [phone]

## 2019-10-18 NOTE — TELEPHONE ENCOUNTER
Prior Authorization Retail Medication Request    Medication/Dose: tretinoin gel  ICD code (if different than what is on RX):    Previously Tried and Failed:  augmented betamethasone dipropionate; diprolene; tacrolimus;   Rationale:  patient has used since 2010    Insurance Name:  Not provided  Insurance ID:  Not provided  CMM Key: [id number]      Pharmacy Information (if different than what is on RX)  Name:    Phone:

## 2019-10-21 NOTE — TELEPHONE ENCOUNTER
Prior Authorization Approval    Authorization Effective Date: 10/20/2019  Authorization Expiration Date: 10/20/2020  Medication: tretinoin  Approved Dose/Quantity: 45  Reference #:     Insurance Company: [employer] - Phone [phone] Fax [phone]  Which Pharmacy is filling the prescription (Not needed for infusion/clinic administered): [facility] [address] [place] [address] - [facility] [address]  Pharmacy Notified: Yes  Patient Notified: **Instructed pharmacy to notify patient when script is ready to /ship.**

## 2020-07-02 ENCOUNTER — VIRTUAL VISIT (OUTPATIENT)
Dept: FAMILY MEDICINE | Facility: OTHER | Age: 50
End: 2020-07-02

## 2020-07-02 NOTE — PROGRESS NOTES
"Date: 2020 13:16:44  Clinician: Richard Kerr  Clinician NPI: [id number]  Patient: Luna Rivera  Patient : 1970  Patient Address: [address] [place], [place] [id number]  Patient Phone: [phone]  Visit Protocol: URI  Patient Summary:  Luna is a 49 year old ( : 1970 ) female who initiated a Visit for COVID-19 (Coronavirus) evaluation and screening. When asked the question \"Please sign me up to receive news, health information and promotions from [employer].\", Luna responded \"No\".    When asked when her symptoms started, Luna reported that she does not have any symptoms.   She denies having recent facial or sinus surgery in the past 60 days and taking antibiotic medication in the past month.    Pertinent COVID-19 (Coronavirus) information  In the past 14 days, Luna has not worked in a congregate living setting.   She does not work or volunteer as healthcare worker or a  and does not work or volunteer in a healthcare facility.   Luna also has not lived in a congregate living setting in the past 14 days. She lives with a healthcare worker.   Luna has had a close contact with a laboratory-confirmed COVID-19 patient in the last 14 days. Additional information about contact with COVID-19 (Coronavirus) patient as reported by the patient (free text): My daughter was with her friend 2020 and today that friend told her she tested positive. My daughter lives in my home.   Pertinent medical history  Luna does not get yeast infections when she takes antibiotics.   Luna does not need a return to work/school note.   Weight: 135 lbs   Luna does not smoke or use smokeless tobacco.   Weight: 135 lbs    MEDICATIONS: Zyrtec oral, ALLERGIES: Ancef  Clinician Response:  Dear Luna,   Based on your exposure to.COVID-19 (Coronavirus), we would like to test you for this virus.  1. Please call [phone] to schedule your visit. Explain that you were " referred by [facility] to have a COVID-19 test. Be ready to share your [facility] visit ID number.  The following will serve as your written order for this COVID Test, ordered by me, for the indication of suspected COVID [Z20.828]: The test will be ordered in [employer], our electronic health record, after you are scheduled. It will show as ordered and authorized by Ariel Johns MD.  Order: COVID-19 (Coronavirus) PCR for ASYMPTOMATIC EXPOSURE testing from [facility].  If you know you have had close contact with someone who tested positive, you should be quarantined for 14 days after this exposure. You should stay in quarantine for the14 days even if the covid test is negative, the optimal time to test after exposure is 5-7 days from the exposure  Quarantine means   What should I do?  For safety, it's very important to follow these rules. Do this for 14 days after the date you were last exposed to the virus..  Stay home and away from others. Don't go to school or anywhere else. Generally quarantine means staying home for work but there are some exceptions to this. Please contact your workplace.   No hugging, kissing or shaking hands.  Don't let anyone visit.  Cover your mouth and nose with a mask, tissue or washcloth to avoid spreading germs.  Wash your hands and face often. Use soap and water.  What are the symptoms of COVID-19?  The most common symptoms are cough, fever and trouble breathing. Less common symptoms include headache, body aches, fatigue (feeling very tired), chills, sore throat, stuffy or runny nose, diarrhea (loose poop), loss of taste or smell, belly pain, and nausea or vomiting (feeling sick to your stomach or throwing up).  After 14 days, if you have still don't have symptoms, you likely don't have this virus.  If you develop symptoms, follow these guidelines.  If you're normally healthy: Please start another [facility] visit to report your symptoms. Go to [email or web address].  If you have a serious health problem (like cancer,  heart failure, an organ transplant or kidney disease): Call your specialty clinic. Let them know that you might have COVID-19.  2. When it's time for your COVID test:  Stay at least 6 feet away from others. (If someone will drive you to your test, stay in the backseat, as far away from the  as you can.)  Cover your mouth and nose with a mask, tissue or washcloth.  Go straight to the testing site. Don't make any stops on the way there or back.  Please note  Caregivers in these groups are at risk for severe illness due to COVID-19:  o People 65 years and older  o People who live in a nursing home or long-term care facility  o People with chronic disease (lung, heart, cancer, diabetes, kidney, liver, immunologic)  o People who have a weakened immune system, including those who:  Are in cancer treatment  Take medicine that weakens the immune system, such as corticosteroids  Had a bone marrow or organ transplant  Have an immune deficiency  Have poorly controlled HIV or AIDS  Are obese (body mass index of 40 or higher)  Smoke regularly  Where can I get more information?  [facility] -- About COVID-19: [email or web address]  CDC -- What to Do If You're Sick: www.cdc.gov/coronavirus/2019-ncov/about/steps-when-sick.html  CDC -- Ending Home Isolation: www.cdc.gov/coronavirus/2019-ncov/hcp/disposition-in-home-patients.html  CDC -- Caring for Someone: www.cdc.gov/coronavirus/2019-ncov/if-you-are-sick/care-for-someone.html  [facility] -- Interim Guidance for Hospital Discharge to Home: www.health.Counts include 234 beds at the [facility].[place]/diseases/coronavirus/hcp/hospdischarge.pdf  [facility] clinical trials (COVID-19 research studies): clinicalaffairs.[facility]/n-clinical-trials  Below are the COVID-19 hotlines at the [place] Department of Health ([facility]). Interpreters are available.  For health questions: Call 395-365-9404 or 1-169.317.9092 (7 a.m. to 7 p.m.)  For questions about schools and childcare: Call 537-460-3095 or  5-014-542-4922 (7 a.m. to 7 p.m.)    Diagnosis: Contact with and (suspected) exposure to other viral communicable diseases  Diagnosis ICD: Z20.828

## 2020-07-03 DIAGNOSIS — Z20.822 ENCOUNTER FOR LABORATORY TESTING FOR COVID-19 VIRUS: Primary | ICD-10-CM

## 2020-07-03 PROCEDURE — U0003 INFECTIOUS AGENT DETECTION BY NUCLEIC ACID (DNA OR RNA); SEVERE ACUTE RESPIRATORY SYNDROME CORONAVIRUS 2 (SARS-COV-2) (CORONAVIRUS DISEASE [COVID-19]), AMPLIFIED PROBE TECHNIQUE, MAKING USE OF HIGH THROUGHPUT TECHNOLOGIES AS DESCRIBED BY CMS-2020-01-R: HCPCS | Performed by: FAMILY MEDICINE

## 2020-07-03 NOTE — LETTER
July 4, 2020        Luna Rivera  [address] [facility] [address]    This letter provides a written record that you were tested for COVID-19 on 7/3/20.       Your result was negative. This means that we didn t find the virus that causes COVID-19 in your sample. A test may show negative when you do actually have the virus. This can happen when the virus is in the early stages of infection, before you feel illness symptoms.    If you have symptoms   Stay home and away from others (self-isolate) until you meet ALL of the guidelines below:    You ve had no fever--and no medicine that reduces fever--for 3 full days (72 hours). And      Your other symptoms have gotten better. For example, your cough or breathing has improved. And     At least 10 days have passed since your symptoms started.    During this time:    Stay home. Don t go to work, school or anywhere else.     Stay in your own room, including for meals. Use your own bathroom if you can.    Stay away from others in your home. No hugging, kissing or shaking hands. No visitors.    Clean  high touch  surfaces often (doorknobs, counters, handles, etc.). Use a household cleaning spray or wipes. You can find a full list on the EPA website at www.epa.gov/pesticide-registration/list-n-disinfectants-use-against-sars-cov-2.    Cover your mouth and nose with a mask, tissue or washcloth to avoid spreading germs.    Wash your hands and face often with soap and water.    Going back to work  Check with your employer for any guidelines to follow for going back to work.    Employers: This document serves as formal notice that your employee tested negative for COVID-19, as of the testing date shown above.

## 2020-07-04 LAB
SARS-COV-2 RNA SPEC QL NAA+PROBE: NOT DETECTED
SPECIMEN SOURCE: NORMAL

## 2020-08-17 ENCOUNTER — AMBULATORY - HEALTHEAST (OUTPATIENT)
Dept: FAMILY MEDICINE | Facility: CLINIC | Age: 50
End: 2020-08-17

## 2020-08-17 ENCOUNTER — VIRTUAL VISIT (OUTPATIENT)
Dept: FAMILY MEDICINE | Facility: OTHER | Age: 50
End: 2020-08-17
Payer: COMMERCIAL

## 2020-08-17 DIAGNOSIS — Z20.822 SUSPECTED COVID-19 VIRUS INFECTION: ICD-10-CM

## 2020-08-17 PROCEDURE — 99421 OL DIG E/M SVC 5-10 MIN: CPT | Performed by: PHYSICIAN ASSISTANT

## 2020-08-17 NOTE — PROGRESS NOTES
"Date: 2020 13:08:52  Clinician: Isatu Campbell  Clinician NPI: [id number]  Patient: Luna Rivera  Patient : 1970  Patient Address: [address] [place] [id number]  Patient Phone: [phone]  Visit Protocol: URI  Patient Summary:  Luna is a 49 year old ( : 1970 ) female who initiated a Visit for COVID-19 (Coronavirus) evaluation and screening. When asked the question \"Please sign me up to receive news, health information and promotions from [employer].\", Luna responded \"No\".    Luna states her symptoms started today.   Her symptoms consist of a sore throat, tooth pain, rhinitis, and myalgia.   Symptom details     Nasal secretions: The color of her mucus is clear.    Sore throat: Luna reports having mild throat pain (1-3 on a 10 point pain scale), does not have exudate on her tonsils, and can swallow liquids. She is not sure if the lymph nodes in her neck are enlarged. A rash has not appeared on the skin since the sore throat started.     Tooth pain: The tooth pain is not caused by a cavity, recent dental work, or other mouth problems.      Luna denies having ear pain, headache, chills, nausea, ageusia, diarrhea, cough, nasal congestion, vomiting, anosmia, facial pain or pressure, fever, wheezing, and malaise. She also denies taking antibiotic medication in the past month and having recent facial or sinus surgery in the past 60 days. She is not experiencing dyspnea.   Precipitating events  Within the past week, Luna has not been exposed to someone with strep throat. She has not recently been exposed to someone with influenza. Luna has been in close contact with the following high risk individuals: people with asthma, heart disease or diabetes, immunocompromised people, and adults 65 or older.   Pertinent COVID-19 (Coronavirus) information  In the past 14 days, Luna has not worked in a congregate living setting.   She does not work or volunteer as healthcare worker " or a  and does not work or volunteer in a healthcare facility.   Luna also has not lived in a congregate living setting in the past 14 days. She lives with a healthcare worker.   Luna has had a close contact with a laboratory-confirmed COVID-19 patient within 14 days of symptom onset.   Since December 2019, Luna and has not had upper respiratory infection or influenza-like illness. Has not been diagnosed with lab-confirmed COVID-19 test   Pertinent medical history  Luna does not get yeast infections when she takes antibiotics.   Luna does not need a return to work/school note.   Weight: 133 lbs   Luna does not smoke or use smokeless tobacco.   Weight: 133 lbs    MEDICATIONS: Zyrtec-D oral, Zyrtec oral, ALLERGIES: Ancef  Clinician Response:  Dear Luna,   Your symptoms show that you may have coronavirus (COVID-19). This illness can cause fever, cough and trouble breathing. Many people get a mild case and get better on their own. Some people can get very sick.  What should I do?  We would like to test you for this virus.   1. Please call [phone] to schedule your visit. Explain that you were referred by [facility] to have a COVID-19 test. Be ready to share your [facility] visit ID number.  The following will serve as your written order for this COVID Test, ordered by me, for the indication of suspected COVID [Z20.828]: The test will be ordered in [employer], our electronic health record, after you are scheduled. It will show as ordered and authorized by Ariel Johns MD.  Order: COVID-19 (Coronavirus) PCR for SYMPTOMATIC testing from [facility].      2. When it's time for your COVID test:  Stay at least 6 feet away from others. (If someone will drive you to your test, stay in the backseat, as far away from the  as you can.)   Cover your mouth and nose with a mask, tissue or washcloth.  Go straight to the testing site. Don't make any stops on the way there or back.      3.Starting now: Stay  "home and away from others (self-isolate) until:   You've had no fever---and no medicine that reduces fever---for one full day (24 hours). And...   Your other symptoms have gotten better. For example, your cough or breathing has improved. And...   At least 10 days have passed since your symptoms started.       During this time, don't leave the house except for testing or medical care.   Stay in your own room, even for meals. Use your own bathroom if you can.   Stay away from others in your home. No hugging, kissing or shaking hands. No visitors.  Don't go to work, school or anywhere else.    Clean \"high touch\" surfaces often (doorknobs, counters, handles, etc.). Use a household cleaning spray or wipes. You'll find a full list of  on the EPA website: www.epa.gov/pesticide-registration/list-n-disinfectants-use-against-sars-cov-2.   Cover your mouth and nose with a mask, tissue or washcloth to avoid spreading germs.  Wash your hands and face often. Use soap and water.  Caregivers in these groups are at risk for severe illness due to COVID-19:  o People 65 years and older  o People who live in a nursing home or long-term care facility  o People with chronic disease (lung, heart, cancer, diabetes, kidney, liver, immunologic)  o People who have a weakened immune system, including those who:   Are in cancer treatment  Take medicine that weakens the immune system, such as corticosteroids  Had a bone marrow or organ transplant  Have an immune deficiency  Have poorly controlled HIV or AIDS  Are obese (body mass index of 40 or higher)  Smoke regularly   o Caregivers should wear gloves while washing dishes, handling laundry and cleaning bedrooms and bathrooms.  o Use caution when washing and drying laundry: Don't shake dirty laundry, and use the warmest water setting that you can.  o For more tips, go to www.cdc.gov/coronavirus/2019-ncov/downloads/10Things.pdf.    4.Sign up for Mary P[facility]. We know it's scary to hear " that you might have COVID-19. We want to track your symptoms to make sure you're okay over the next 2 weeks. Please look for an email from [employer]---this is a free, online program that we'll use to keep in touch. To sign up, follow the link in the email. Learn more at [email or web address]  How can I take care of myself?   Get lots of rest. Drink extra fluids (unless a doctor has told you not to).   Take Tylenol (acetaminophen) for fever or pain. If you have liver or kidney problems, ask your family doctor if it's okay to take Tylenol.   Adults can take either:    650 mg (two 325 mg pills) every 4 to 6 hours, or...   1,000 mg (two 500 mg pills) every 8 hours as needed.    Note: Don't take more than 3,000 mg in one day. Acetaminophen is found in many medicines (both prescribed and over-the-counter medicines). Read all labels to be sure you don't take too much.   For children, check the Tylenol bottle for the right dose. The dose is based on the child's age or weight.    If you have other health problems (like cancer, heart failure, an organ transplant or severe kidney disease): Call your specialty clinic if you don't feel better in the next 2 days.       Know when to call 911. Emergency warning signs include:    Trouble breathing or shortness of breath Pain or pressure in the chest that doesn't go away Feeling confused like you haven't felt before, or not being able to wake up Bluish-colored lips or face.  Where can I get more information?    [facility] -- About COVID-19: [email or web address]   CDC -- What to Do If You're Sick: www.cdc.gov/coronavirus/2019-ncov/about/steps-when-sick.html   CDC -- Ending Home Isolation: www.cdc.gov/coronavirus/2019-ncov/hcp/disposition-in-home-patients.html   CDC -- Caring for Someone: www.cdc.gov/coronavirus/2019-ncov/if-you-are-sick/care-for-someone.html   [facility] -- Interim Guidance for Hospital Discharge to Home:  www.health.[facility].mn.us/diseases/coronavirus/hcp/hospdischarge.pdf   [facility] clinical trials (COVID-19 research studies): clinicalaffairs.[employer].[facility]/umn-clinical-trials    Below are the COVID-19 hotlines at the [facility] ([facility]). Interpreters are available.    For health questions: Call 188-428-7734 or 1-106.617.4989 (7 a.m. to 7 p.m.) For questions about schools and childcare: Call 864-271-0350 or 1-991.906.8583 (7 a.m. to 7 p.m.)    Diagnosis: Acute pharyngitis, unspecified  Diagnosis ICD: J02.9

## 2020-08-18 ENCOUNTER — AMBULATORY - HEALTHEAST (OUTPATIENT)
Dept: FAMILY MEDICINE | Facility: CLINIC | Age: 50
End: 2020-08-18

## 2020-08-18 DIAGNOSIS — Z20.822 SUSPECTED COVID-19 VIRUS INFECTION: ICD-10-CM

## 2020-08-20 ENCOUNTER — COMMUNICATION - HEALTHEAST (OUTPATIENT)
Dept: SCHEDULING | Facility: CLINIC | Age: 50
End: 2020-08-20

## 2020-11-10 DIAGNOSIS — J30.2 SEASONAL ALLERGIC RHINITIS, UNSPECIFIED TRIGGER: ICD-10-CM

## 2020-11-10 NOTE — TELEPHONE ENCOUNTER
"Requested Prescriptions   Pending Prescriptions Disp Refills     fluticasone (FLONASE) 50 MCG/ACT nasal spray [Pharmacy Med Name: Fluticasone Propionate 50 MCG/ACT Nasal Suspension] 16 g 0     Sig: Use 2 spray(s) in each nostril once daily       Nasal Allergy Protocol Passed - 11/10/2020 10:36 AM        Passed - Patient is age 12 or older        Passed - Recent (12 mo) or future (30 days) visit within the authorizing provider's specialty     Patient has had an office visit with the authorizing provider or a provider within the authorizing providers department within the previous 12 mos or has a future within next 30 days. See \"Patient Info\" tab in inbasket, or \"Choose Columns\" in Meds & Orders section of the refill encounter.              Passed - Medication is active on med list             "

## 2020-11-12 RX ORDER — FLUTICASONE PROPIONATE 50 MCG
SPRAY, SUSPENSION (ML) NASAL
Qty: 16 G | Refills: 0 | Status: SHIPPED | OUTPATIENT
Start: 2020-11-12 | End: 2021-03-12

## 2020-12-22 DIAGNOSIS — J30.2 SEASONAL ALLERGIC RHINITIS, UNSPECIFIED TRIGGER: ICD-10-CM

## 2020-12-22 NOTE — TELEPHONE ENCOUNTER
"Requested Prescriptions   Pending Prescriptions Disp Refills     fluticasone (FLONASE) 50 MCG/ACT nasal spray [Pharmacy Med Name: Fluticasone Propionate 50 MCG/ACT Nasal Suspension] 16 g 0     Sig: Use 2 spray(s) in each nostril once daily       Nasal Allergy Protocol Passed - 12/22/2020  9:51 AM        Passed - Patient is age 12 or older        Passed - Recent (12 mo) or future (30 days) visit within the authorizing provider's specialty     Patient has had an office visit with the authorizing provider or a provider within the authorizing providers department within the previous 12 mos or has a future within next 30 days. See \"Patient Info\" tab in inbasket, or \"Choose Columns\" in Meds & Orders section of the refill encounter.              Passed - Medication is active on med list             " 97

## 2020-12-22 NOTE — LETTER
[facility]  [address] [facility] [address]  [phone]        December 28, 2020  Luna Rivera  [address] [facility] [address]    Dear Luna,    I care about your health and have reviewed your health plan. I have reviewed your medical conditions, medication list, and lab results and am making recommendations based on this review, to better manage your health.    You are in particular need of attention regarding:  -Cervical Cancer Screening  -Wellness (Physical) Visit     I am recommending that you:  -schedule a WELLNESS (Physical) APPOINTMENT and a PAP SMEAR EXAM which is due.  Please disregard this reminder if you have had this exam elsewhere within the last year.  It would be helpful for us to have a copy of your recent pap smear report in our file so that we can best coordinate your care.    Please call us at [phone] (or use [employer]) to address the above recommendations.     Thank you for trusting [facility] and we appreciate the opportunity to serve you.  We look forward to supporting your healthcare needs in the future.    Healthy Regards,        Catrina Jin, COURTNEY/Felicia TIAN, RN, BSN

## 2020-12-28 RX ORDER — FLUTICASONE PROPIONATE 50 MCG
SPRAY, SUSPENSION (ML) NASAL
Qty: 16 G | Refills: 0 | OUTPATIENT
Start: 2020-12-28

## 2020-12-28 NOTE — TELEPHONE ENCOUNTER
Refill refused (can buy OTC). Had a saran refill in Nov. Has not seen PCP since 10/17/2019.  Overdue for several HM items. Letter mailed and MyChart sent.    Felicia TIAN RN, BSN

## 2021-01-13 DIAGNOSIS — J30.2 SEASONAL ALLERGIC RHINITIS, UNSPECIFIED TRIGGER: ICD-10-CM

## 2021-01-14 NOTE — TELEPHONE ENCOUNTER
"Requested Prescriptions   Pending Prescriptions Disp Refills     fluticasone (FLONASE) 50 MCG/ACT nasal spray [Pharmacy Med Name: Fluticasone Propionate 50 MCG/ACT Nasal Suspension] 16 g 0     Sig: Use 2 spray(s) in each nostril once daily       Nasal Allergy Protocol Failed - 1/13/2021  7:27 PM        Failed - Recent (12 mo) or future (30 days) visit within the authorizing provider's specialty     Patient has had an office visit with the authorizing provider or a provider within the authorizing providers department within the previous 12 mos or has a future within next 30 days. See \"Patient Info\" tab in inbasket, or \"Choose Columns\" in Meds & Orders section of the refill encounter.              Passed - Patient is age 12 or older        Passed - Medication is active on med list             "

## 2021-01-15 ENCOUNTER — HEALTH MAINTENANCE LETTER (OUTPATIENT)
Age: 51
End: 2021-01-15

## 2021-01-15 RX ORDER — FLUTICASONE PROPIONATE 50 MCG
SPRAY, SUSPENSION (ML) NASAL
Qty: 16 G | Refills: 0 | OUTPATIENT
Start: 2021-01-15

## 2021-01-15 NOTE — TELEPHONE ENCOUNTER
Refill refused. OTC med.  Pt was notified via MyChart she is due for Physical; nothing has been scheduled.    Felicia TIAN RN, BSN

## 2021-03-02 ENCOUNTER — OFFICE VISIT (OUTPATIENT)
Dept: DERMATOLOGY | Facility: CLINIC | Age: 51
End: 2021-03-02
Payer: COMMERCIAL

## 2021-03-02 VITALS — RESPIRATION RATE: 16 BRPM | SYSTOLIC BLOOD PRESSURE: 117 MMHG | HEART RATE: 83 BPM | DIASTOLIC BLOOD PRESSURE: 80 MMHG

## 2021-03-02 DIAGNOSIS — L73.8 SEBACEOUS HYPERPLASIA: Primary | ICD-10-CM

## 2021-03-02 DIAGNOSIS — L82.0 INFLAMED SEBORRHEIC KERATOSIS: ICD-10-CM

## 2021-03-02 DIAGNOSIS — L82.1 SEBORRHEIC KERATOSIS: ICD-10-CM

## 2021-03-02 DIAGNOSIS — L81.4 LENTIGO: ICD-10-CM

## 2021-03-02 DIAGNOSIS — B07.0 PLANTAR WART: ICD-10-CM

## 2021-03-02 DIAGNOSIS — D22.9 MULTIPLE BENIGN NEVI: ICD-10-CM

## 2021-03-02 DIAGNOSIS — D18.01 CHERRY ANGIOMA: ICD-10-CM

## 2021-03-02 PROCEDURE — 17110 DESTRUCTION B9 LES UP TO 14: CPT | Performed by: PHYSICIAN ASSISTANT

## 2021-03-02 PROCEDURE — 96999 UNLISTED SPEC DERM SVC/PX: CPT | Performed by: PHYSICIAN ASSISTANT

## 2021-03-02 PROCEDURE — 99213 OFFICE O/P EST LOW 20 MIN: CPT | Mod: 25 | Performed by: PHYSICIAN ASSISTANT

## 2021-03-02 NOTE — LETTER
3/2/2021         RE: Luna Rivera  [address] [facility] [address]        Dear Colleague,    Thank you for referring your patient, Luna Rivera, to the [facility]. Please see a copy of my visit note below.    Luna Rivera is an extremely pleasant 50 year old year old female patient here today for bumps on face and red spots on chest. She also notes itchy brown spots on back. No painful or bleeding areas. Patient has no other skin complaints today.  Remainder of the HPI, Meds, PMH, Allergies, FH, and SH was reviewed in chart.    Pertinent Hx:   No personal history of skin cancer.   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     Contact dermatitis and other eczema, due to unspecified cause      Enteritis due to [place] virus 2002     Herpes simplex without mention of complication     Last outbreak      Intramural and subserous leiomyoma of uterus 3/19/2018     Irritable bowel syndrome      PONV (postoperative nausea and vomiting)        Past Surgical History:   Procedure Laterality Date     ARTHROSCOPY KNEE WITH MEDIAL MENISCECTOMY  2014    Procedure: ARTHROSCOPY KNEE WITH MEDIAL MENISCECTOMY;  Surgeon: Alejandro Dodge MD;  Location: [facility]       DELIVERY ONLY  ,      C LIGATE FALLOPIAN TUBE,POSTPARTUM       COLONOSCOPY N/A 2018    Procedure: COLONOSCOPY;  Surgeon: Jose Mora MD;  Location: [facility]     ESOPHAGOSCOPY, GASTROSCOPY, DUODENOSCOPY (EGD), COMBINED N/A 2016    Procedure: COMBINED ESOPHAGOSCOPY, GASTROSCOPY, DUODENOSCOPY (EGD), BIOPSY SINGLE OR MULTIPLE;  Surgeon: Jose Mora MD;  Location: [facility]     ESOPHAGOSCOPY, GASTROSCOPY, DUODENOSCOPY (EGD), COMBINED N/A 2018    Procedure: COMBINED ESOPHAGOSCOPY, GASTROSCOPY, DUODENOSCOPY (EGD);  Surgeon: Jose Mora MD;  Location: [facility]     HC HYSTEROSCOPY,  SURGICAL; W/ ENDOMETRIAL ABLATION, ANY METHOD  3/30/10    Novasure ablation     HYSTERECTOMY TOTAL ABDOMINAL, BILATERAL SALPINGO-OOPHORECTOMY, COMBINED Bilateral 3/27/2018    TRACI only; tubes removed; ovaries left in        Family History   Problem Relation Age of Onset     Breast Cancer Mother         age 46     Cancer Mother         Throat, larynx (d/t radiation from breast cancer)tongue cancer 9/2011     Hypertension Father      Cancer Father         lung CA     Neurologic Disorder Maternal Grandmother         Parkinson's     C.A.D. Maternal Grandmother      Thyroid Disease Maternal Grandmother         hyperthyroid     Arthritis Maternal Grandfather      Heart Disease Maternal Grandfather      Osteoporosis Paternal Grandmother      Heart Disease Paternal Grandfather      Cancer - colorectal No family hx of      Prostate Cancer No family hx of      Cerebrovascular Disease No family hx of      Diabetes No family hx of      Asthma No family hx of        Social History     Socioeconomic History     Marital status:      Spouse name: Not on file     Number of children: 2     Years of education: Not on file     Highest education level: Not on file   Occupational History     Occupation: Stay at Home Mom     Employer: [employer]     Occupation:  prn   Social Needs     Financial resource strain: Not on file     Food insecurity     Worry: Not on file     Inability: Not on file     Transportation needs     Medical: Not on file     Non-medical: Not on file   Tobacco Use     Smoking status: Never Smoker     Smokeless tobacco: Never Used   Substance and Sexual Activity     Alcohol use: Yes     Comment: rarely     Drug use: No     Sexual activity: Yes     Partners: Male     Birth control/protection: Female Surgical     Comment: BTL   Lifestyle     Physical activity     Days per week: Not on file     Minutes per session: Not on file     Stress: Not on file   Relationships     Social connections     Talks  on phone: Not on file     Gets together: Not on file     Attends Jehovah's witness service: Not on file     Active member of club or organization: Not on file     Attends meetings of clubs or organizations: Not on file     Relationship status: Not on file     Intimate partner violence     Fear of current or ex partner: Not on file     Emotionally abused: Not on file     Physically abused: Not on file     Forced sexual activity: Not on file   Other Topics Concern     Parent/sibling w/ CABG, MI or angioplasty before 65F 55M? No   Social History Narrative    Home with kids                           Outpatient Encounter Medications as of 3/2/2021   Medication Sig Dispense Refill     fluticasone (FLONASE) 50 MCG/ACT nasal spray Use 2 spray(s) in each nostril once daily 16 g 0     hydrocortisone (ANUSOL-HC) 2.5 % cream Place rectally 2 times daily 28.35 g 1     Omeprazole (PRILOSEC PO) Take 20 mg by mouth daily as needed        tretinoin (RETIN-A) 0.025 % external gel APPLY EXTERNALLY AT BEDTIME 45 g 7     acyclovir (ZOVIRAX) 400 MG tablet TAKE 1 TABLET BY MOUTH TWICE DAILY (Patient not taking: Reported on 10/17/2019) 60 tablet 0     augmented betamethasone dipropionate (DIPROLENE-AF) 0.05 % ointment Apply twice daily as needed. (Patient not taking: Reported on 10/17/2019) 50 g 2     ibuprofen (ADVIL/MOTRIN) 400 MG tablet Take 1 tablet (400 mg) by mouth every 6 hours as needed for moderate pain (Patient not taking: Reported on 10/17/2019) 40 tablet 1     tacrolimus (PROTOPIC) 0.1 % external ointment Apply topically 2 times daily (Patient not taking: Reported on 10/17/2019) 60 g 0     No facility-administered encounter medications on file as of 3/2/2021.              Review Of Systems  Skin: As above  Eyes: negative  Ears/Nose/Throat: negative  Respiratory: No shortness of breath, dyspnea on exertion, cough      O:   NAD, WDWN, Alert & Oriented, Mood & Affect wnl, Vitals stable   Here today alone   /80 (BP Location:  Left arm, Patient Position: Sitting, Cuff Size: Adult Regular)   Pulse 83   Resp 16   LMP 02/25/2018    General appearance normal   Vitals stable   Alert, oriented and in no acute distress   Yellow lobulated papules on forehead, temples  Stuck on papules and brown macules on trunk and ext   Red papules on trunk  Brown papules and macules with regular pigment network and border  Verrucous papule on plantar foot      The remainder of skin exam is normal       Eyes: Conjunctivae/lids:Normal     ENT: Lips,: normal    MSK:Normal    Cardiovascular: peripheral edema none    Pulm: Breathing Normal    Lymph Nodes: No Head and Neck Lymphadenopathy     Neuro/Psych: Orientation:Alert and Orientedx3 ; Mood/Affect:normal     A/P:  1. Plantar wart x 1 on left foot  LN2:  Treated with LN2 for 5s for 1-2 cycles. Warned risks of blistering, pain, pigment change, scarring, and incomplete resolution.  Advised patient to return if lesions do not completely resolve.  Wound care sheet given.  2. Inflamed seborrheic keratosis on back x 2  LN2:  Treated with LN2 for 5s for 1-2 cycles. Warned risks of blistering, pain, pigment change, scarring, and incomplete resolution.  Advised patient to return if lesions do not completely resolve.  Wound care sheet given.  3. Sebaceous hyperplasia on face and cherry angoma on chest x 14  Discussed cosmetic charge of $150. Discussed these can return. Treated with electrocautery.   4. Seborrheic keratosis, lentigo, angioma, bnign nevi   BENIGN LESIONS DISCUSSED WITH PATIENT:  I discussed the specifics of tumor, prognosis, and genetics of benign lesions.  I explained that treatment of these lesions would be purely cosmetic and not medically neccessary.  I discussed with patient different removal options including excision, cautery and /or laser.      Nature and genetics of benign skin lesions dicussed with patient.  Signs and Symptoms of skin cancer discussed with patient.  ABCDEs of melanoma reviewed  with patient.  Patient encouraged to perform monthly skin exams.  UV precautions reviewed with patient.  Risks of non-melanoma skin cancer discussed with patient   Return to clinic in one year or sooner if needed.           Again, thank you for allowing me to participate in the care of your patient.        Sincerely,        Luna Roldan PA-C

## 2021-03-02 NOTE — NURSING NOTE
"Initial /80 (BP Location: Left arm, Patient Position: Sitting, Cuff Size: Adult Regular)   Pulse 83   Resp 16   LMP 02/25/2018  Estimated body mass index is 22.93 kg/m  as calculated from the following:    Height as of 10/17/19: 1.727 m (5' 8\").    Weight as of 10/17/19: 68.4 kg (150 lb 12.8 oz). .    Gerda Jerez, [facility]    "

## 2021-03-02 NOTE — PROGRESS NOTES
Luna Rivera is an extremely pleasant 50 year old year old female patient here today for bumps on face and red spots on chest. She also notes itchy brown spots on back. No painful or bleeding areas. Patient has no other skin complaints today.  Remainder of the HPI, Meds, PMH, Allergies, FH, and SH was reviewed in chart.    Pertinent Hx:   No personal history of skin cancer.   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     Contact dermatitis and other eczema, due to unspecified cause      Enteritis due to [place] virus 2002     Herpes simplex without mention of complication     Last outbreak      Intramural and subserous leiomyoma of uterus 3/19/2018     Irritable bowel syndrome      PONV (postoperative nausea and vomiting)        Past Surgical History:   Procedure Laterality Date     ARTHROSCOPY KNEE WITH MEDIAL MENISCECTOMY  2014    Procedure: ARTHROSCOPY KNEE WITH MEDIAL MENISCECTOMY;  Surgeon: Alejandro Dodge MD;  Location: [facility]       DELIVERY ONLY  2002     C LIGATE FALLOPIAN TUBE,POSTPARTUM       COLONOSCOPY N/A 2018    Procedure: COLONOSCOPY;  Surgeon: Jose Mora MD;  Location: [facility]     ESOPHAGOSCOPY, GASTROSCOPY, DUODENOSCOPY (EGD), COMBINED N/A 2016    Procedure: COMBINED ESOPHAGOSCOPY, GASTROSCOPY, DUODENOSCOPY (EGD), BIOPSY SINGLE OR MULTIPLE;  Surgeon: Jose Mora MD;  Location: [facility]     ESOPHAGOSCOPY, GASTROSCOPY, DUODENOSCOPY (EGD), COMBINED N/A 2018    Procedure: COMBINED ESOPHAGOSCOPY, GASTROSCOPY, DUODENOSCOPY (EGD);  Surgeon: Jose Mora MD;  Location: [facility]     HC HYSTEROSCOPY, SURGICAL; W/ ENDOMETRIAL ABLATION, ANY METHOD  3/30/10    Novasure ablation     HYSTERECTOMY TOTAL ABDOMINAL, BILATERAL SALPINGO-OOPHORECTOMY, COMBINED Bilateral 3/27/2018    TRACI only; tubes removed; ovaries left in        Family History   Problem  Relation Age of Onset     Breast Cancer Mother         age 46     Cancer Mother         Throat, larynx (d/t radiation from breast cancer)tongue cancer 9/2011     Hypertension Father      Cancer Father         lung CA     Neurologic Disorder Maternal Grandmother         Parkinson's     C.A.D. Maternal Grandmother      Thyroid Disease Maternal Grandmother         hyperthyroid     Arthritis Maternal Grandfather      Heart Disease Maternal Grandfather      Osteoporosis Paternal Grandmother      Heart Disease Paternal Grandfather      Cancer - colorectal No family hx of      Prostate Cancer No family hx of      Cerebrovascular Disease No family hx of      Diabetes No family hx of      Asthma No family hx of        Social History     Socioeconomic History     Marital status:      Spouse name: Not on file     Number of children: 2     Years of education: Not on file     Highest education level: Not on file   Occupational History     Occupation: Stay at Home Mom     Employer: [employer]     Occupation:  prn   Social Needs     Financial resource strain: Not on file     Food insecurity     Worry: Not on file     Inability: Not on file     Transportation needs     Medical: Not on file     Non-medical: Not on file   Tobacco Use     Smoking status: Never Smoker     Smokeless tobacco: Never Used   Substance and Sexual Activity     Alcohol use: Yes     Comment: rarely     Drug use: No     Sexual activity: Yes     Partners: Male     Birth control/protection: Female Surgical     Comment: BTL   Lifestyle     Physical activity     Days per week: Not on file     Minutes per session: Not on file     Stress: Not on file   Relationships     Social connections     Talks on phone: Not on file     Gets together: Not on file     Attends Jew service: Not on file     Active member of club or organization: Not on file     Attends meetings of clubs or organizations: Not on file     Relationship status: Not on file      Intimate partner violence     Fear of current or ex partner: Not on file     Emotionally abused: Not on file     Physically abused: Not on file     Forced sexual activity: Not on file   Other Topics Concern     Parent/sibling w/ CABG, MI or angioplasty before 65F 55M? No   Social History Narrative    Home with kids                           Outpatient Encounter Medications as of 3/2/2021   Medication Sig Dispense Refill     fluticasone (FLONASE) 50 MCG/ACT nasal spray Use 2 spray(s) in each nostril once daily 16 g 0     hydrocortisone (ANUSOL-HC) 2.5 % cream Place rectally 2 times daily 28.35 g 1     Omeprazole (PRILOSEC PO) Take 20 mg by mouth daily as needed        tretinoin (RETIN-A) 0.025 % external gel APPLY EXTERNALLY AT BEDTIME 45 g 7     acyclovir (ZOVIRAX) 400 MG tablet TAKE 1 TABLET BY MOUTH TWICE DAILY (Patient not taking: Reported on 10/17/2019) 60 tablet 0     augmented betamethasone dipropionate (DIPROLENE-AF) 0.05 % ointment Apply twice daily as needed. (Patient not taking: Reported on 10/17/2019) 50 g 2     ibuprofen (ADVIL/MOTRIN) 400 MG tablet Take 1 tablet (400 mg) by mouth every 6 hours as needed for moderate pain (Patient not taking: Reported on 10/17/2019) 40 tablet 1     tacrolimus (PROTOPIC) 0.1 % external ointment Apply topically 2 times daily (Patient not taking: Reported on 10/17/2019) 60 g 0     No facility-administered encounter medications on file as of 3/2/2021.              Review Of Systems  Skin: As above  Eyes: negative  Ears/Nose/Throat: negative  Respiratory: No shortness of breath, dyspnea on exertion, cough      O:   NAD, WDWN, Alert & Oriented, Mood & Affect wnl, Vitals stable   Here today alone   /80 (BP Location: Left arm, Patient Position: Sitting, Cuff Size: Adult Regular)   Pulse 83   Resp 16   LMP 02/25/2018    General appearance normal   Vitals stable   Alert, oriented and in no acute distress   Yellow lobulated papules on forehead, temples  Stuck on  papules and brown macules on trunk and ext   Red papules on trunk  Brown papules and macules with regular pigment network and border  Verrucous papule on plantar foot      The remainder of skin exam is normal       Eyes: Conjunctivae/lids:Normal     ENT: Lips,: normal    MSK:Normal    Cardiovascular: peripheral edema none    Pulm: Breathing Normal    Lymph Nodes: No Head and Neck Lymphadenopathy     Neuro/Psych: Orientation:Alert and Orientedx3 ; Mood/Affect:normal     A/P:  1. Plantar wart x 1 on left foot  LN2:  Treated with LN2 for 5s for 1-2 cycles. Warned risks of blistering, pain, pigment change, scarring, and incomplete resolution.  Advised patient to return if lesions do not completely resolve.  Wound care sheet given.  2. Inflamed seborrheic keratosis on back x 2  LN2:  Treated with LN2 for 5s for 1-2 cycles. Warned risks of blistering, pain, pigment change, scarring, and incomplete resolution.  Advised patient to return if lesions do not completely resolve.  Wound care sheet given.  3. Sebaceous hyperplasia on face and cherry angoma on chest x 14  Discussed cosmetic charge of $150. Discussed these can return. Treated with electrocautery.   4. Seborrheic keratosis, lentigo, angioma, bnign nevi   BENIGN LESIONS DISCUSSED WITH PATIENT:  I discussed the specifics of tumor, prognosis, and genetics of benign lesions.  I explained that treatment of these lesions would be purely cosmetic and not medically neccessary.  I discussed with patient different removal options including excision, cautery and /or laser.      Nature and genetics of benign skin lesions dicussed with patient.  Signs and Symptoms of skin cancer discussed with patient.  ABCDEs of melanoma reviewed with patient.  Patient encouraged to perform monthly skin exams.  UV precautions reviewed with patient.  Risks of non-melanoma skin cancer discussed with patient   Return to clinic in one year or sooner if needed.

## 2021-03-12 ENCOUNTER — OFFICE VISIT (OUTPATIENT)
Dept: FAMILY MEDICINE | Facility: CLINIC | Age: 51
End: 2021-03-12
Payer: COMMERCIAL

## 2021-03-12 VITALS
DIASTOLIC BLOOD PRESSURE: 78 MMHG | OXYGEN SATURATION: 99 % | RESPIRATION RATE: 13 BRPM | TEMPERATURE: 94.3 F | HEIGHT: 68 IN | WEIGHT: 138.4 LBS | BODY MASS INDEX: 20.98 KG/M2 | SYSTOLIC BLOOD PRESSURE: 116 MMHG | HEART RATE: 80 BPM

## 2021-03-12 DIAGNOSIS — N95.1 MENOPAUSAL SYNDROME (HOT FLASHES): ICD-10-CM

## 2021-03-12 DIAGNOSIS — Z00.00 ROUTINE GENERAL MEDICAL EXAMINATION AT A HEALTH CARE FACILITY: Primary | ICD-10-CM

## 2021-03-12 DIAGNOSIS — K21.9 GASTROESOPHAGEAL REFLUX DISEASE WITHOUT ESOPHAGITIS: ICD-10-CM

## 2021-03-12 DIAGNOSIS — Z80.3 FAMILY HISTORY OF BREAST CANCER IN MOTHER: ICD-10-CM

## 2021-03-12 DIAGNOSIS — Z13.6 CARDIOVASCULAR SCREENING; LDL GOAL LESS THAN 160: ICD-10-CM

## 2021-03-12 DIAGNOSIS — Z12.31 ENCOUNTER FOR SCREENING MAMMOGRAM FOR BREAST CANCER: ICD-10-CM

## 2021-03-12 DIAGNOSIS — Z13.1 SCREENING FOR DIABETES MELLITUS: ICD-10-CM

## 2021-03-12 DIAGNOSIS — J30.2 SEASONAL ALLERGIC RHINITIS, UNSPECIFIED TRIGGER: ICD-10-CM

## 2021-03-12 LAB
CHOLEST SERPL-MCNC: 222 MG/DL
DEPRECATED CALCIDIOL+CALCIFEROL SERPL-MC: 37 UG/L (ref 20–75)
GLUCOSE SERPL-MCNC: 92 MG/DL (ref 70–99)
HDLC SERPL-MCNC: 105 MG/DL
LDLC SERPL CALC-MCNC: 108 MG/DL
NONHDLC SERPL-MCNC: 117 MG/DL
TRIGL SERPL-MCNC: 44 MG/DL
TSH SERPL DL<=0.005 MIU/L-ACNC: 1.45 MU/L (ref 0.4–4)

## 2021-03-12 PROCEDURE — 83001 ASSAY OF GONADOTROPIN (FSH): CPT | Performed by: NURSE PRACTITIONER

## 2021-03-12 PROCEDURE — 99396 PREV VISIT EST AGE 40-64: CPT | Performed by: NURSE PRACTITIONER

## 2021-03-12 PROCEDURE — 36415 COLL VENOUS BLD VENIPUNCTURE: CPT | Performed by: NURSE PRACTITIONER

## 2021-03-12 PROCEDURE — 82306 VITAMIN D 25 HYDROXY: CPT | Performed by: NURSE PRACTITIONER

## 2021-03-12 PROCEDURE — 99213 OFFICE O/P EST LOW 20 MIN: CPT | Mod: 25 | Performed by: NURSE PRACTITIONER

## 2021-03-12 PROCEDURE — 80061 LIPID PANEL: CPT | Performed by: NURSE PRACTITIONER

## 2021-03-12 PROCEDURE — 84443 ASSAY THYROID STIM HORMONE: CPT | Performed by: NURSE PRACTITIONER

## 2021-03-12 PROCEDURE — 82670 ASSAY OF TOTAL ESTRADIOL: CPT | Performed by: NURSE PRACTITIONER

## 2021-03-12 PROCEDURE — 82947 ASSAY GLUCOSE BLOOD QUANT: CPT | Performed by: NURSE PRACTITIONER

## 2021-03-12 RX ORDER — FLUTICASONE PROPIONATE 50 MCG
1 SPRAY, SUSPENSION (ML) NASAL DAILY
Qty: 16 G | Refills: 11 | Status: SHIPPED | OUTPATIENT
Start: 2021-03-12 | End: 2022-03-23

## 2021-03-12 ASSESSMENT — MIFFLIN-ST. JEOR: SCORE: 1291.28

## 2021-03-12 NOTE — RESULT ENCOUNTER NOTE
All of your lab results are normal.  Awaiting hormonal testing - pending.    Please notify patient of results.  COURTNEY Curiel

## 2021-03-12 NOTE — LETTER
March 16, 2021      Luna Rivera  [address] [facility] [address]        Dear ,    We are writing to inform you of your test results.  Estradiol and FSH indicate probable post menopause levels. All other labs look good.     Resulted Orders   Lipid panel reflex to direct LDL Fasting   Result Value Ref Range    Cholesterol 222 (H) <200 mg/dL      Comment:      Desirable:       <200 mg/dl    Triglycerides 44 <150 mg/dL      Comment:      Fasting specimen    HDL Cholesterol 105 >49 mg/dL    LDL Cholesterol Calculated 108 (H) <100 mg/dL      Comment:      Above desirable:  100-129 mg/dl  Borderline High:  130-159 mg/dL  High:             160-189 mg/dL  Very high:       >189 mg/dl      Non HDL Cholesterol 117 <130 mg/dL   Vitamin D Deficiency   Result Value Ref Range    Vitamin D Deficiency screening 37 20 - 75 ug/L      Comment:      Season, race, dietary intake, and treatment affect the concentration of   25-hydroxy-Vitamin D. Values may decrease during winter months and increase   during summer months. Values 20-29 ug/L may indicate Vitamin D insufficiency   and values <20 ug/L may indicate Vitamin D deficiency.  Vitamin D determination is routinely performed by an immunoassay specific for   25 hydroxyvitamin D3.  If an individual is on vitamin D2 (ergocalciferol)   supplementation, please specify 25 OH vitamin D2 and D3 level determination by   LCMSMS test [id number].     Glucose   Result Value Ref Range    Glucose 92 70 - 99 mg/dL      Comment:      Fasting specimen   TSH with free T4 reflex   Result Value Ref Range    TSH 1.45 0.40 - 4.00 mU/L   Follicle stimulating hormone   Result Value Ref Range    FSH 65.8 IU/L      Comment:      FSH Reference Range  Female: Follicular      2.5-10.2          Mid-cycle       3.4-33.4          Luteal          1.5-9.1          Postmenopausal  23.0-116.3     Estradiol   Result Value Ref Range    Estradiol <11 pg/mL      Comment:      Estradiol reference ranges  for pre-menopausal  Follicular     pg/mL  Mid-cycle    pg/mL  Luteal          pg/mL         If you have any questions or concerns, please call the clinic at the number listed above.       Sincerely,      Catrina Jin NP/bmc

## 2021-03-12 NOTE — PATIENT INSTRUCTIONS

## 2021-03-12 NOTE — PROGRESS NOTES
SUBJECTIVE:   CC: Luna Rivera is an 50 year old woman who presents for preventive health visit.       Patient has been advised of split billing requirements and indicates understanding: Yes  Healthy Habits:    Getting at least 3 servings of Calcium per day:  Yes    Bi-annual eye exam:  Yes    Dental care twice a year:  Yes    Sleep apnea or symptoms of sleep apnea:  None    Diet:  Gluten-free/reduced (cutting out carbs)    Frequency of exercise:  6-7 days/week    Duration of exercise:: walks and tries to get over 28531 steps a day.    Taking medications regularly:  Not Applicable    Barriers to taking medications:  Not applicable    Medication side effects:  None    PHQ-2 Total Score:    Additional concerns today:  No    Patient advised she is due for a mammogram - states hte last time she had one done she was advised she needed a 3D mammo ordered.     However had hysterectomy - no cervix, pap no longer indicated.     Medication Followup of Flonase nasal spray    Taking Medication as prescribed: yes    Side Effects:  None    Medication Helping Symptoms:  yes       Today's PHQ-2 Score:   PHQ-2 ( 1999 Pfizer) 3/12/2021   Q1: Little interest or pleasure in doing things 0   Q2: Feeling down, depressed or hopeless 0   PHQ-2 Score 0       Abuse: Current or Past (Physical, Sexual or Emotional) - No  Do you feel safe in your environment? Yes    Have you ever done Advance Care Planning? (For example, a Health Directive, POLST, or a discussion with a medical provider or your loved ones about your wishes): Has had discussions with loved ones.    Social History     Tobacco Use     Smoking status: Never Smoker     Smokeless tobacco: Never Used   Substance Use Topics     Alcohol use: Yes     Comment: rarely     If you drink alcohol do you typically have >3 drinks per day or >7 drinks per week? No    Alcohol Use 5/22/2014   Prescreen: >3 drinks/day or >7 drinks/week? The patient does not drink >3 drinks per day nor >7  drinks per week.   No flowsheet data found.    Any new diagnosis of family breast, ovarian, or bowel cancer? No     Reviewed orders with patient.  Reviewed health maintenance and updated orders accordingly - Yes  Lab work is in process  Labs reviewed in EPIC  BP Readings from Last 3 Encounters:   21 116/78   21 117/80   10/17/19 118/78    Wt Readings from Last 3 Encounters:   21 62.8 kg (138 lb 6.4 oz)   10/17/19 68.4 kg (150 lb 12.8 oz)   18 63.5 kg (140 lb)                  Patient Active Problem List   Diagnosis     Allergic rhinitis     Family history of breast cancer in mother     CARDIOVASCULAR SCREENING; LDL GOAL LESS THAN 160     Acne     Gastroesophageal reflux disease without esophagitis     Situational anxiety     S/P TRACI (total abdominal hysterectomy)     Menopausal syndrome (hot flashes)     Past Surgical History:   Procedure Laterality Date     ARTHROSCOPY KNEE WITH MEDIAL MENISCECTOMY  2014    Procedure: ARTHROSCOPY KNEE WITH MEDIAL MENISCECTOMY;  Surgeon: Alejandro Dodge MD;  Location: [facility]       DELIVERY ONLY  2002     C LIGATE FALLOPIAN TUBE,POSTPARTUM       COLONOSCOPY N/A 2018    Procedure: COLONOSCOPY;  Surgeon: Jose Mora MD;  Location: [facility]     ESOPHAGOSCOPY, GASTROSCOPY, DUODENOSCOPY (EGD), COMBINED N/A 2016    Procedure: COMBINED ESOPHAGOSCOPY, GASTROSCOPY, DUODENOSCOPY (EGD), BIOPSY SINGLE OR MULTIPLE;  Surgeon: Jose Mora MD;  Location: [facility]     ESOPHAGOSCOPY, GASTROSCOPY, DUODENOSCOPY (EGD), COMBINED N/A 2018    Procedure: COMBINED ESOPHAGOSCOPY, GASTROSCOPY, DUODENOSCOPY (EGD);  Surgeon: Jose Mora MD;  Location: [facility]     HC HYSTEROSCOPY, SURGICAL; W/ ENDOMETRIAL ABLATION, ANY METHOD  3/30/10    Novasure ablation     HYSTERECTOMY TOTAL ABDOMINAL, BILATERAL SALPINGO-OOPHORECTOMY, COMBINED Bilateral 3/27/2018    TRACI only; tubes removed; ovaries left in       Social History     Tobacco Use     Smoking  status: Never Smoker     Smokeless tobacco: Never Used   Substance Use Topics     Alcohol use: Yes     Comment: rarely     Family History   Problem Relation Age of Onset     Breast Cancer Mother         age 46     Cancer Mother         Throat, larynx (d/t radiation from breast cancer)tongue cancer 9/2011     Hypertension Father      Cancer Father         lung CA     Neurologic Disorder Maternal Grandmother         Parkinson's     C.A.D. Maternal Grandmother      Thyroid Disease Maternal Grandmother         hyperthyroid     Arthritis Maternal Grandfather      Heart Disease Maternal Grandfather      Osteoporosis Paternal Grandmother      Heart Disease Paternal Grandfather      Cancer - colorectal No family hx of      Prostate Cancer No family hx of      Cerebrovascular Disease No family hx of      Diabetes No family hx of      Asthma No family hx of          Current Outpatient Medications   Medication Sig Dispense Refill     fluticasone (FLONASE) 50 MCG/ACT nasal spray Spray 1 spray into both nostrils daily 16 g 11     augmented betamethasone dipropionate (DIPROLENE-AF) 0.05 % ointment Apply twice daily as needed. (Patient not taking: Reported on 10/17/2019) 50 g 2     hydrocortisone (ANUSOL-HC) 2.5 % cream Place rectally 2 times daily (Patient not taking: Reported on 3/12/2021) 28.35 g 1     Omeprazole (PRILOSEC PO) Take 20 mg by mouth daily as needed        tacrolimus (PROTOPIC) 0.1 % external ointment Apply topically 2 times daily (Patient not taking: Reported on 10/17/2019) 60 g 0     tretinoin (RETIN-A) 0.025 % external gel APPLY EXTERNALLY AT BEDTIME (Patient not taking: Reported on 3/12/2021) 45 g 7     Allergies   Allergen Reactions     Compazine Fatigue     Neurological s/s-can't wake up     Ancef [Cefazolin Sodium] Hives     Recent Labs   Lab Test 10/17/19  1102 11/01/18  1848 10/24/16  1214 10/24/16  1214 05/06/15  0858 05/22/14  1056 05/22/14  1056 02/27/13  0945   *  --   --   --   --   --  92 134*    HDL 82  --   --   --   --   --  69 63   TRIG 62  --   --   --   --   --  72 63   ALT 18 21  --   --  18  --   --   --    CR 0.76 0.88   < > 0.84 0.84   < >  --   --    GFRESTIMATED >90 68   < > 73 73   < >  --   --    GFRESTBLACK >90 82   < > 88 88   < >  --   --    POTASSIUM 3.6 3.6   < > 4.1 3.9   < >  --   --    TSH  --   --   --  1.71  --   --  2.09  --     < > = values in this interval not displayed.        Breast CA Risk Screening:  No flowsheet data found.  No flowsheet data found.    Mammogram Screening: Recommended annual mammography  Pertinent mammograms are reviewed under the imaging tab.    History of abnormal Pap smear: Status post benign hysterectomy. Health Maintenance and Surgical History updated.  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     Contact dermatitis and other eczema, due to unspecified cause      Enteritis due to [place] virus 2002     Herpes simplex without mention of complication     Last outbreak      Intramural and subserous leiomyoma of uterus 3/19/2018     Irritable bowel syndrome      PONV (postoperative nausea and vomiting)       Past Surgical History:   Procedure Laterality Date     ARTHROSCOPY KNEE WITH MEDIAL MENISCECTOMY  2014    Procedure: ARTHROSCOPY KNEE WITH MEDIAL MENISCECTOMY;  Surgeon: Alejandro Dodge MD;  Location: [facility]       DELIVERY ONLY  2002     C LIGATE FALLOPIAN TUBE,POSTPARTUM       COLONOSCOPY N/A 2018    Procedure: COLONOSCOPY;  Surgeon: Jose Mora MD;  Location: [facility]     ESOPHAGOSCOPY, GASTROSCOPY, DUODENOSCOPY (EGD), COMBINED N/A 2016     Procedure: COMBINED ESOPHAGOSCOPY, GASTROSCOPY, DUODENOSCOPY (EGD), BIOPSY SINGLE OR MULTIPLE;  Surgeon: Jose Mora MD;  Location: [facility]     ESOPHAGOSCOPY, GASTROSCOPY, DUODENOSCOPY (EGD), COMBINED N/A 2018    Procedure: COMBINED ESOPHAGOSCOPY, GASTROSCOPY, DUODENOSCOPY (EGD);  Surgeon: Jose Mora MD;  Location: [facility]     HC HYSTEROSCOPY, SURGICAL; W/ ENDOMETRIAL ABLATION, ANY METHOD  3/30/10    Novasure ablation     HYSTERECTOMY TOTAL ABDOMINAL, BILATERAL SALPINGO-OOPHORECTOMY, COMBINED Bilateral 3/27/2018    TRACI only; tubes removed; ovaries left in     OB History    Para Term  AB Living   2 2 2 0 0 2   SAB TAB Ectopic Multiple Live Births   0 0 0 0 2      # Outcome Date GA Lbr Xavier/2nd Weight Sex Delivery Anes PTL Lv   2 Term 02 37w0d  3.827 kg (8 lb 7 oz) M CS   KODI      Birth Comments: PTL, hyperemesis      Apgar1: 6  Apgar5: 8   1 Term 00 39w0d 38:00 3.544 kg (7 lb 13 oz) F CS   KODI      Birth Comments: Failure to progress, hyperemesis      Name: Leopoldo       Review of Systems  CONSTITUTIONAL: NEGATIVE for fever, chills, change in weight  INTEGUMENTARY/SKIN: NEGATIVE for worrisome rashes, moles or lesions  EYES: NEGATIVE for vision changes or irritation  ENT: NEGATIVE for ear, mouth and throat problems  RESP: NEGATIVE for significant cough or SOB  BREAST: NEGATIVE for masses, tenderness or discharge  CV: NEGATIVE for chest pain, palpitations or peripheral edema  GI: NEGATIVE for nausea, abdominal pain, heartburn, or change in bowel habits  : NEGATIVE for unusual urinary or vaginal symptoms. No vaginal bleeding.  MUSCULOSKELETAL: NEGATIVE for significant arthralgias or myalgia  NEURO: NEGATIVE for weakness, dizziness or paresthesias  ENDOCRINE: POS for hotflashes mostly at night. S/p hysterectomy  PSYCHIATRIC: NEGATIVE for changes in mood or affect      OBJECTIVE:   /78 (BP Location: Right arm, Patient Position: Sitting, Cuff Size: Adult Regular)   Pulse 80    "Temp 94.3  F (34.6  C) (Tympanic)   Resp 13   Ht 1.727 m (5' 8\")   Wt 62.8 kg (138 lb 6.4 oz)   LMP 02/25/2018   SpO2 99%   BMI 21.04 kg/m    Physical Exam  GENERAL: healthy, alert and no distress  EYES: Eyes grossly normal to inspection, PERRL and conjunctivae and sclerae normal  HENT: ear canals and TM's normal, nose and mouth without ulcers or lesions  NECK: no adenopathy, no asymmetry, masses, or scars and thyroid normal to palpation  RESP: lungs clear to auscultation - no rales, rhonchi or wheezes  BREAST: normal without masses, tenderness or nipple discharge and no palpable axillary masses or adenopathy  CV: regular rate and rhythm, normal S1 S2, no S3 or S4, no murmur, click or rub, no peripheral edema and peripheral pulses strong  ABDOMEN: soft, nontender, no hepatosplenomegaly, no masses and bowel sounds normal  MS: no gross musculoskeletal defects noted, no edema  SKIN: no suspicious lesions or rashes  NEURO: Normal strength and tone, mentation intact and speech normal  PSYCH: mentation appears normal, affect normal/bright    Diagnostic Test Results:  Labs reviewed in Epic    ASSESSMENT/PLAN:   1. Routine general medical examination at a health care facility     - Vitamin D Deficiency    2. Menopausal syndrome (hot flashes)   Discussed non hormonal options.  Will follow up if wanting to discuss further.    - Vitamin D Deficiency  - TSH with free T4 reflex  - Follicle stimulating hormone  - Estradiol    3. Seasonal allergic rhinitis, unspecified trigger     - fluticasone (FLONASE) 50 MCG/ACT nasal spray; Spray 1 spray into both nostrils daily  Dispense: 16 g; Refill: 11    4. Gastroesophageal reflux disease without esophagitis   Controlled via over the counter prilosec and diet changes.    5. Family history of breast cancer in mother  Mammogram ordered.    6. Encounter for screening mammogram for breast cancer     - MA Screen Bilateral w/Salo; Future    7. CARDIOVASCULAR SCREENING; LDL GOAL LESS THAN " "160     - Lipid panel reflex to direct LDL Fasting    8. Screening for diabetes mellitus     - Glucose    Patient has been advised of split billing requirements and indicates understanding: Yes  COUNSELING:  Reviewed preventive health counseling, as reflected in patient instructions       Regular exercise       Healthy diet/nutrition    Estimated body mass index is 21.04 kg/m  as calculated from the following:    Height as of this encounter: 1.727 m (5' 8\").    Weight as of this encounter: 62.8 kg (138 lb 6.4 oz).        She reports that she has never smoked. She has never used smokeless tobacco.      Counseling Resources:  ATP IV Guidelines  Pooled Cohorts Equation Calculator  Breast Cancer Risk Calculator  BRCA-Related Cancer Risk Assessment: FHS-7 Tool  FRAX Risk Assessment  ICSI Preventive Guidelines  Dietary Guidelines for Americans, 2010  USDA's MyPlate  ASA Prophylaxis  Lung CA Screening    Catrina Jin NP  [facility]    "

## 2021-03-13 LAB
ESTRADIOL SERPL-MCNC: <11 PG/ML
FSH SERPL-ACNC: 65.8 IU/L

## 2021-03-21 ENCOUNTER — HEALTH MAINTENANCE LETTER (OUTPATIENT)
Age: 51
End: 2021-03-21

## 2021-05-07 ENCOUNTER — HOSPITAL ENCOUNTER (OUTPATIENT)
Dept: MAMMOGRAPHY | Facility: CLINIC | Age: 51
Discharge: HOME | End: 2021-05-07
Attending: NURSE PRACTITIONER | Admitting: NURSE PRACTITIONER
Payer: COMMERCIAL

## 2021-05-07 DIAGNOSIS — Z12.31 ENCOUNTER FOR SCREENING MAMMOGRAM FOR BREAST CANCER: ICD-10-CM

## 2021-05-07 PROCEDURE — 77063 BREAST TOMOSYNTHESIS BI: CPT

## 2021-06-04 ENCOUNTER — OFFICE VISIT (OUTPATIENT)
Dept: FAMILY MEDICINE | Facility: CLINIC | Age: 51
End: 2021-06-04
Payer: COMMERCIAL

## 2021-06-04 VITALS
HEIGHT: 68 IN | SYSTOLIC BLOOD PRESSURE: 90 MMHG | BODY MASS INDEX: 20 KG/M2 | HEART RATE: 96 BPM | OXYGEN SATURATION: 98 % | TEMPERATURE: 97.8 F | DIASTOLIC BLOOD PRESSURE: 60 MMHG | WEIGHT: 132 LBS | RESPIRATION RATE: 16 BRPM

## 2021-06-04 DIAGNOSIS — R30.0 DYSURIA: Primary | ICD-10-CM

## 2021-06-04 DIAGNOSIS — R31.9 HEMATURIA, UNSPECIFIED TYPE: ICD-10-CM

## 2021-06-04 LAB
ALBUMIN UR-MCNC: NEGATIVE MG/DL
APPEARANCE UR: CLEAR
BILIRUB UR QL STRIP: NEGATIVE
COLOR UR AUTO: YELLOW
GLUCOSE UR STRIP-MCNC: NEGATIVE MG/DL
HGB UR QL STRIP: ABNORMAL
KETONES UR STRIP-MCNC: 15 MG/DL
LEUKOCYTE ESTERASE UR QL STRIP: NEGATIVE
NITRATE UR QL: NEGATIVE
NON-SQ EPI CELLS #/AREA URNS LPF: ABNORMAL /LPF
PH UR STRIP: 5 PH (ref 5–7)
RBC #/AREA URNS AUTO: ABNORMAL /HPF
SOURCE: ABNORMAL
SP GR UR STRIP: 1.02 (ref 1–1.03)
UROBILINOGEN UR STRIP-ACNC: 0.2 EU/DL (ref 0.2–1)
WBC #/AREA URNS AUTO: ABNORMAL /HPF

## 2021-06-04 PROCEDURE — 81001 URINALYSIS AUTO W/SCOPE: CPT | Performed by: FAMILY MEDICINE

## 2021-06-04 PROCEDURE — 99213 OFFICE O/P EST LOW 20 MIN: CPT | Performed by: FAMILY MEDICINE

## 2021-06-04 RX ORDER — NITROFURANTOIN 25; 75 MG/1; MG/1
100 CAPSULE ORAL 2 TIMES DAILY
Qty: 10 CAPSULE | Refills: 0 | Status: SHIPPED | OUTPATIENT
Start: 2021-06-04 | End: 2021-06-09

## 2021-06-04 ASSESSMENT — MIFFLIN-ST. JEOR: SCORE: 1262.25

## 2021-06-04 NOTE — PATIENT INSTRUCTIONS
Continue to work on hydration and fluids.     Antibiotic prescribed to be used as needed if symptoms worsen.

## 2021-06-04 NOTE — PROGRESS NOTES
Assessment & Plan     Dysuria  Patient with symptoms of urinary frequency and also bladder discomfort.  This is improved from yesterday with good hydration.  Urinalysis without any signs of infection with no WBCs, nitrite, or leukocyte esterase.  It did show RBCs in the urine.  This could represent potential kidney stone.  She has minimal discomfort at this time and is otherwise feeling well other than her urinary symptoms so  imaging is not warranted at this time.  She is concerned that she has an early urinary tract infection which was not seen on UA.  Discussed giving antibiotics to be used if symptoms significantly worsen or if they do not continue to improve.  Reasons to return to clinic discussed.  All questions answered.  Patient agrees with the plan.  - UA reflex to Microscopic and Culture  - Urine Microscopic  - nitroFURantoin macrocrystal-monohydrate (MACROBID) 100 MG capsule  Dispense: 10 capsule; Refill: 0    Hematuria, unspecified type  Plan to recheck in 1 month. History of Hysterectomy.   - UA reflex to Microscopic and Culture    Follow up with urine sample in 1 month. Sooner if needed.     Abdiel Smith DO  [facility]    Julius Carrasco is a 50 year old who presents for the following health issues     HPI     Genitourinary - Female  Onset/Duration: started last night  Description:   Painful urination (Dysuria): YES           Frequency: YES  Blood in urine (Hematuria): no  Delay in urine (Hesitency): no  Intensity: mild  Progression of Symptoms:  same  Accompanying Signs & Symptoms:  Fever/chills: no  Flank pain: No.   Nausea and vomiting: no  Vaginal symptoms: none  Abdominal/Pelvic Pain: YES- downward pressure  History:   History of frequent UTI s: YES  History of kidney stones: no  Sexually Active: YES  Possibility of pregnancy: No  Precipitating or alleviating factors: None  Therapies tried and outcome: Increase fluid intake      Increased urinary frequency and  "burning with urination. Reports being very similar to prior UTI's.   No blood in the urine.   No fevers or chills.   No flank pain.   History of hysterectomy.     Review of Systems   Constitutional, HEENT, cardiovascular, pulmonary, gi and gu systems are negative, except as otherwise noted.      Objective    BP 90/60 (BP Location: Left arm, Patient Position: Chair, Cuff Size: Adult Regular)   Pulse 96   Temp 97.8  F (36.6  C) (Tympanic)   Resp 16   Ht 1.727 m (5' 8\")   Wt 59.9 kg (132 lb)   LMP 02/25/2018   SpO2 98%   BMI 20.07 kg/m    Body mass index is 20.07 kg/m .  Physical Exam   General: alert, cooperative, no acute distress   CV: RRR, no murmur  Resp: non-labored breathing, clear to auscultation, no wheezing or rales   Abdomen: Soft, mild tenderness over suprapubic region otherwise nontender.  Storey sign negative.  No flank pain.  No back pain.  Augustin sign negative.  Extremities: No peripheral edema, calves non-tender.     "

## 2021-06-15 ENCOUNTER — OFFICE VISIT (OUTPATIENT)
Dept: DERMATOLOGY | Facility: CLINIC | Age: 51
End: 2021-06-15
Payer: COMMERCIAL

## 2021-06-15 VITALS — HEART RATE: 68 BPM | SYSTOLIC BLOOD PRESSURE: 126 MMHG | OXYGEN SATURATION: 100 % | DIASTOLIC BLOOD PRESSURE: 76 MMHG

## 2021-06-15 DIAGNOSIS — D23.9 DERMATOFIBROMA: Primary | ICD-10-CM

## 2021-06-15 PROCEDURE — 99213 OFFICE O/P EST LOW 20 MIN: CPT | Performed by: PHYSICIAN ASSISTANT

## 2021-06-15 NOTE — LETTER
6/15/2021         RE: Luna Rivera  [address] [facility] [address]        Dear Colleague,    Thank you for referring your patient, Luna Rivera, to the [facility]. Please see a copy of my visit note below.    Luna Rivera is an extremely pleasant 50 year old year old female patient here today for bump on left thigh. Present for a few months. She denies any pain or bleeding. Patient has no other skin complaints today.  Remainder of the HPI, Meds, PMH, Allergies, FH, and SH was reviewed in chart.    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     Contact dermatitis and other eczema, due to unspecified cause      Enteritis due to [place] virus 2002     Herpes simplex without mention of complication     Last outbreak      Intramural and subserous leiomyoma of uterus 3/19/2018     Irritable bowel syndrome      PONV (postoperative nausea and vomiting)        Past Surgical History:   Procedure Laterality Date     ARTHROSCOPY KNEE WITH MEDIAL MENISCECTOMY  2014    Procedure: ARTHROSCOPY KNEE WITH MEDIAL MENISCECTOMY;  Surgeon: Alejandro Dodge MD;  Location: [facility]  DELIVERY ONLY  ,      C LIGATE FALLOPIAN TUBE,POSTPARTUM       COLONOSCOPY N/A 2018    Procedure: COLONOSCOPY;  Surgeon: Jose Mora MD;  Location: [facility]     ESOPHAGOSCOPY, GASTROSCOPY, DUODENOSCOPY (EGD), COMBINED N/A 2016    Procedure: COMBINED ESOPHAGOSCOPY, GASTROSCOPY, DUODENOSCOPY (EGD), BIOPSY SINGLE OR MULTIPLE;  Surgeon: Jose Mora MD;  Location: [facility]     ESOPHAGOSCOPY, GASTROSCOPY, DUODENOSCOPY (EGD), COMBINED N/A 2018    Procedure: COMBINED ESOPHAGOSCOPY, GASTROSCOPY, DUODENOSCOPY (EGD);  Surgeon: Jose Mora MD;  Location: [facility]     HC HYSTEROSCOPY, SURGICAL; W/ ENDOMETRIAL ABLATION, ANY METHOD  3/30/10    Novasure ablation      HYSTERECTOMY TOTAL ABDOMINAL, BILATERAL SALPINGO-OOPHORECTOMY, COMBINED Bilateral 3/27/2018    TRACI only; tubes removed; ovaries left in        Family History   Problem Relation Age of Onset     Breast Cancer Mother         age 46     Cancer Mother         Throat, larynx (d/t radiation from breast cancer)tongue cancer 9/2011     Hypertension Father      Cancer Father         lung CA     Neurologic Disorder Maternal Grandmother         Parkinson's     C.A.D. Maternal Grandmother      Thyroid Disease Maternal Grandmother         hyperthyroid     Arthritis Maternal Grandfather      Heart Disease Maternal Grandfather      Osteoporosis Paternal Grandmother      Heart Disease Paternal Grandfather      Cancer - colorectal No family hx of      Prostate Cancer No family hx of      Cerebrovascular Disease No family hx of      Diabetes No family hx of      Asthma No family hx of        Social History     Socioeconomic History     Marital status:      Spouse name: Not on file     Number of children: 2     Years of education: Not on file     Highest education level: Not on file   Occupational History     Occupation: Stay at Home Mom     Employer: [email or web address]     Occupation:  prn   Social Needs     Financial resource strain: Not on file     Food insecurity     Worry: Not on file     Inability: Not on file     Transportation needs     Medical: Not on file     Non-medical: Not on file   Tobacco Use     Smoking status: Never Smoker     Smokeless tobacco: Never Used   Substance and Sexual Activity     Alcohol use: Yes     Comment: rarely     Drug use: No     Sexual activity: Yes     Partners: Male     Birth control/protection: Female Surgical     Comment: BTL   Lifestyle     Physical activity     Days per week: Not on file     Minutes per session: Not on file     Stress: Not on file   Relationships     Social connections     Talks on phone: Not on file     Gets together: Not on file     Attends Alevism  service: Not on file     Active member of club or organization: Not on file     Attends meetings of clubs or organizations: Not on file     Relationship status: Not on file     Intimate partner violence     Fear of current or ex partner: Not on file     Emotionally abused: Not on file     Physically abused: Not on file     Forced sexual activity: Not on file   Other Topics Concern     Parent/sibling w/ CABG, MI or angioplasty before 65F 55M? No   Social History Narrative    Home with kids                           Outpatient Encounter Medications as of 6/15/2021   Medication Sig Dispense Refill     fluticasone (FLONASE) 50 MCG/ACT nasal spray Spray 1 spray into both nostrils daily 16 g 11     hydrocortisone (ANUSOL-HC) 2.5 % cream Place rectally 2 times daily 28.35 g 1     Omeprazole (PRILOSEC PO) Take 20 mg by mouth daily as needed        tretinoin (RETIN-A) 0.025 % external gel APPLY EXTERNALLY AT BEDTIME 45 g 7     augmented betamethasone dipropionate (DIPROLENE-AF) 0.05 % ointment Apply twice daily as needed. (Patient not taking: Reported on 10/17/2019) 50 g 2     tacrolimus (PROTOPIC) 0.1 % external ointment Apply topically 2 times daily (Patient not taking: Reported on 10/17/2019) 60 g 0     No facility-administered encounter medications on file as of 6/15/2021.              O:   NAD, WDWN, Alert & Oriented, Mood & Affect wnl, Vitals stable   Here today alone   /76   Pulse 68   LMP 02/25/2018   SpO2 100%    General appearance normal   Vitals stable   Alert, oriented and in no acute distress     Slightly pink firm papule on left thigh       Eyes: Conjunctivae/lids:Normal     ENT: Lips: normal    MSK:Normal    Cardiovascular: peripheral edema none    Pulm: Breathing Normal    Lymph Nodes: No Head and Neck Lymphadenopathy     Neuro/Psych: Orientation:Alert and Orientedx3 ; Mood/Affect:normal   A/P:  1. Clinically consistent with dermatofibroma  Discussed if bothersome can do a punch biopsy. She  will like to wait, if bothersome she will schedule.         Again, thank you for allowing me to participate in the care of your patient.        Sincerely,        Luna Roldan PA-C

## 2021-06-15 NOTE — NURSING NOTE
Chief Complaint   Patient presents with     Derm Problem       Vitals:    06/15/21 1507   BP: 126/76   Pulse: 68   SpO2: 100%     Wt Readings from Last 1 Encounters:   06/04/21 59.9 kg (132 lb)       Tatianna Aranda LPN.................6/15/2021

## 2021-06-16 NOTE — PROGRESS NOTES
Luna Rviera is an extremely pleasant 50 year old year old female patient here today for bump on left thigh. Present for a few months. She denies any pain or bleeding. Patient has no other skin complaints today.  Remainder of the HPI, Meds, PMH, Allergies, FH, and SH was reviewed in chart.    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     Contact dermatitis and other eczema, due to unspecified cause      Enteritis due to [place] virus 2002     Herpes simplex without mention of complication     Last outbreak      Intramural and subserous leiomyoma of uterus 3/19/2018     Irritable bowel syndrome      PONV (postoperative nausea and vomiting)        Past Surgical History:   Procedure Laterality Date     ARTHROSCOPY KNEE WITH MEDIAL MENISCECTOMY  2014    Procedure: ARTHROSCOPY KNEE WITH MEDIAL MENISCECTOMY;  Surgeon: Alejadnro Dodge MD;  Location: [facility]       DELIVERY ONLY  ,      C LIGATE FALLOPIAN TUBE,POSTPARTUM       COLONOSCOPY N/A 2018    Procedure: COLONOSCOPY;  Surgeon: Jose Mora MD;  Location: [facility]     ESOPHAGOSCOPY, GASTROSCOPY, DUODENOSCOPY (EGD), COMBINED N/A 2016    Procedure: COMBINED ESOPHAGOSCOPY, GASTROSCOPY, DUODENOSCOPY (EGD), BIOPSY SINGLE OR MULTIPLE;  Surgeon: Jose Mora MD;  Location: [facility]     ESOPHAGOSCOPY, GASTROSCOPY, DUODENOSCOPY (EGD), COMBINED N/A 2018    Procedure: COMBINED ESOPHAGOSCOPY, GASTROSCOPY, DUODENOSCOPY (EGD);  Surgeon: Jose Mora MD;  Location: [facility] HYSTEROSCOPY, SURGICAL; W/ ENDOMETRIAL ABLATION, ANY METHOD  3/30/10    Novasure ablation     HYSTERECTOMY TOTAL ABDOMINAL, BILATERAL SALPINGO-OOPHORECTOMY, COMBINED Bilateral 3/27/2018    TRACI only; tubes removed; ovaries left in        Family History   Problem Relation Age of Onset     Breast Cancer Mother         age 46     Cancer Mother          Throat, larynx (d/t radiation from breast cancer)tongue cancer 9/2011     Hypertension Father      Cancer Father         lung CA     Neurologic Disorder Maternal Grandmother         Parkinson's     C.A.D. Maternal Grandmother      Thyroid Disease Maternal Grandmother         hyperthyroid     Arthritis Maternal Grandfather      Heart Disease Maternal Grandfather      Osteoporosis Paternal Grandmother      Heart Disease Paternal Grandfather      Cancer - colorectal No family hx of      Prostate Cancer No family hx of      Cerebrovascular Disease No family hx of      Diabetes No family hx of      Asthma No family hx of        Social History     Socioeconomic History     Marital status:      Spouse name: Not on file     Number of children: 2     Years of education: Not on file     Highest education level: Not on file   Occupational History     Occupation: Stay at Home Mom     Employer: [employer]     Occupation:  prn   Social Needs     Financial resource strain: Not on file     Food insecurity     Worry: Not on file     Inability: Not on file     Transportation needs     Medical: Not on file     Non-medical: Not on file   Tobacco Use     Smoking status: Never Smoker     Smokeless tobacco: Never Used   Substance and Sexual Activity     Alcohol use: Yes     Comment: rarely     Drug use: No     Sexual activity: Yes     Partners: Male     Birth control/protection: Female Surgical     Comment: BTL   Lifestyle     Physical activity     Days per week: Not on file     Minutes per session: Not on file     Stress: Not on file   Relationships     Social connections     Talks on phone: Not on file     Gets together: Not on file     Attends [employer] service: Not on file     Active member of club or organization: Not on file     Attends meetings of clubs or organizations: Not on file     Relationship status: Not on file     Intimate partner violence     Fear of current or ex partner: Not on file      Emotionally abused: Not on file     Physically abused: Not on file     Forced sexual activity: Not on file   Other Topics Concern     Parent/sibling w/ CABG, MI or angioplasty before 65F 55M? No   Social History Narrative    Home with kids                           Outpatient Encounter Medications as of 6/15/2021   Medication Sig Dispense Refill     fluticasone (FLONASE) 50 MCG/ACT nasal spray Spray 1 spray into both nostrils daily 16 g 11     hydrocortisone (ANUSOL-HC) 2.5 % cream Place rectally 2 times daily 28.35 g 1     Omeprazole (PRILOSEC PO) Take 20 mg by mouth daily as needed        tretinoin (RETIN-A) 0.025 % external gel APPLY EXTERNALLY AT BEDTIME 45 g 7     augmented betamethasone dipropionate (DIPROLENE-AF) 0.05 % ointment Apply twice daily as needed. (Patient not taking: Reported on 10/17/2019) 50 g 2     tacrolimus (PROTOPIC) 0.1 % external ointment Apply topically 2 times daily (Patient not taking: Reported on 10/17/2019) 60 g 0     No facility-administered encounter medications on file as of 6/15/2021.              O:   NAD, WDWN, Alert & Oriented, Mood & Affect wnl, Vitals stable   Here today alone   /76   Pulse 68   LMP 02/25/2018   SpO2 100%    General appearance normal   Vitals stable   Alert, oriented and in no acute distress     Slightly pink firm papule on left thigh       Eyes: Conjunctivae/lids:Normal     ENT: Lips: normal    MSK:Normal    Cardiovascular: peripheral edema none    Pulm: Breathing Normal    Lymph Nodes: No Head and Neck Lymphadenopathy     Neuro/Psych: Orientation:Alert and Orientedx3 ; Mood/Affect:normal   A/P:  1. Clinically consistent with dermatofibroma  Discussed if bothersome can do a punch biopsy. She will like to wait, if bothersome she will schedule.

## 2021-07-19 ENCOUNTER — HOSPITAL ENCOUNTER (EMERGENCY)
Facility: CLINIC | Age: 51
Discharge: LEFT BEFORE DISCHARGE | End: 2021-07-19
Payer: COMMERCIAL

## 2021-07-19 ENCOUNTER — NURSE TRIAGE (OUTPATIENT)
Dept: FAMILY MEDICINE | Facility: CLINIC | Age: 51
End: 2021-07-19

## 2021-07-19 NOTE — TELEPHONE ENCOUNTER
Patient reports redness to an area she had a hornet bite/sting last week. It is on lower inside of leg between calf and shin.  There was a bit of redness and soreness initially, then it improved, and now it has returned and seems to be getting worse-area of redness and hardness getting larger. She denies concerning signs of allergic reaction such as hives, facial swelling, issues with breathing or swallowing, denies fever.     Disposition:  Visit today to r/o cellulitis infection. No regular or virtual appointments available here or nearby clinics, so recommended urgent care per protocol. Understanding voiced.  Patient is bringing her mother here for an eye appt today shortly, so will likely go over to urgent care then.     Reason for Disposition    Bee sting(s)    Red or very tender (to touch) area, getting larger over 48 hours after the sting    Additional Information    Negative: Passed out (i.e., fainted, collapsed and was not responding)    Negative: Wheezing or difficulty breathing    Negative: Hoarseness, cough or tightness in the throat or chest    Negative: Swollen tongue or difficulty swallowing    Negative: Life-threatening reaction (anaphylaxis) in the past to same insect bite and < 2 hours since bite    Negative: Sounds like a life-threatening emergency to the triager    Negative: Passed out (i.e., fainted, collapsed and was not responding)    Negative: Wheezing or difficulty breathing    Negative: Hoarseness, cough, or tightness in the throat or chest    Negative: Swollen tongue or difficulty swallowing    Negative: Life-threatening reaction in past to sting (anaphylaxis) and < 2 hours since sting    Negative: Sounds like a life-threatening emergency to the triager    Negative: Not a bee, wasp, hornet, or yellow jacket sting    Negative: Hives, itching, or swelling elsewhere on body (i.e., not at a site of sting) and started within 2 hours of sting    Negative: Vomiting or abdominal cramps and started  within 2 hours of sting    Negative: Gave epinephrine shot and no symptoms now    Negative: Patient sounds very sick or weak to the triager    Negative: Sting inside the mouth    Negative: Sting on eyeball (e.g., cornea)    Negative: More than 50 stings    Negative: Fever and area is red    Negative: Fever and area is very tender to touch    Negative: Red streak or red line and length > 2 inches (5 cm)    Negative: Red or very tender (to touch) area, and started over 24 hours after the sting    Answer Assessment - Initial Assessment Questions  See my notes.    Answer Assessment - Initial Assessment Questions  See my notes.    Protocols used: INSECT BITE-A-OH, BEE OR YELLOW JACKET STING-A-OH    Kyra Sawyer RN  [facility]

## 2021-09-05 ENCOUNTER — HEALTH MAINTENANCE LETTER (OUTPATIENT)
Age: 51
End: 2021-09-05

## 2021-12-23 NOTE — PROGRESS NOTES
[facility]           Radiation Oncology      [address] [facility], [address]        Thaddeus Speaks: [phone]        F: [phone]       [place]                   Dr. Letty Zamarripa MD PhD    ON TREATMENT VISIT (OTV) NOTE     Date of Service: 2021  Patient ID: Colby Tatum   : 1952  MRN: [id number]   Acct Number: [de-identified]     DIAGNOSIS:  Cancer Staging  Malignant neoplasm of upper lobe of left lung [facility])  Staging form: Lung, AJCC 8th Edition  - Clinical stage from 2019: Stage IV (cT4, cN3, pM1a) - Signed by Cyndie Koyanagi, MD on 2019      Treatment Area: Thoracic    Current Total Dose(cGy): 2700  Current Fraction: 9    Patient was seen today for weekly visit.      Wt Readings from Last 3 Encounters:   21 151 lb 12.8 oz (68.9 kg)   21 148 lb 6.4 oz (67.3 kg)   21 148 lb (67.1 kg)       /78   Pulse 90   Temp 97.7 °F (36.5 °C)   Resp 18   Wt 151 lb 12.8 oz (68.9 kg)   SpO2 96%   BMI 26.06 kg/m²     Lab Results   Component Value Date    WBC 9.0 2019     2019       Comfort Alteration  Fatigue:Must curtail daily activities even with rest periods and early bedtime    Pain Location: Rt neck and chest- between shoulder blades  Pain Intensity (Current): 4 Moderate pain  Pain Treatment: Opioid  Pain Relief: Pain relieved 50%    Emotional Alteration:   Coping: somewhat effective    Nutritional Alteration  Anorexia: none   Nausea: No nausea noted  Vomiting: No vomiting   Dyspepsia/Heartburn: None  Dysphagia/Esophagitis: Mild dysphagia, but can eat regular diet    Skin Alteration   Skin reaction: No changes noted  Alopecia: No loss    Mucous Membrane Alteration  Mucositis XRT Related: None  Pharynx and Esophagus- Acute: Mild dysphagia or odynophagia, topical anesthetic, soft diet  Voice Changes: Normal    Ventilation Alteration  Cough: Productive    Mucous Quantity/Quality: clear to white  Additional Comments: unable to Pt had hysterectomy. Pt states last endo procedure has a narrowing of esophagus . Not in chart.   afford [facility]- offered assistance- does not want at this time    MEDICATIONS:     Current Outpatient Medications   Medication Sig Dispense Refill    ondansetron (ZOFRAN-ODT) 4 MG disintegrating tablet Take 1 tablet by mouth 3 times daily as needed for Nausea or Vomiting 21 tablet 5    diazePAM (VALIUM) 2 MG tablet Take 1 tablet by mouth every 8 hours as needed for Anxiety for up to 10 days. 30 tablet 0    gabapentin (NEURONTIN) 800 MG tablet Take 1 tablet by mouth 3 times daily for 90 days. 90 tablet 3    DULoxetine (CYMBALTA) 60 MG extended release capsule Take 1 capsule by mouth daily 90 capsule 1    meloxicam (MOBIC) 7.5 MG tablet Take 1 tablet by mouth daily 30 tablet 3    montelukast (SINGULAIR) 10 MG tablet Take 1 tablet by mouth nightly 90 tablet 3    Handicap Placard MISC by Does not apply route Diagnoses: LUNG CANCER, DEGENERATIVE ARTHRITIS, CANCER PAIN   EXPIRATION: 4/12/2024 1 each 0    simethicone (MYLICON) 80 MG chewable tablet Take 1 tablet by mouth 4 times daily as needed for Flatulence 180 tablet 3    aspirin (ECOTRIN LOW STRENGTH) 81 MG EC tablet Take 81 mg by mouth daily      folic acid (FOLVITE) 1 MG tablet Take 1 tablet by mouth daily 30 tablet 5    fluticasone (FLONASE) 50 MCG/ACT nasal spray 1 spray by Nasal route daily. 1 Bottle 6    oxyCODONE-acetaminophen (PERCOCET) 7.5-325 MG per tablet Take 1 tablet by mouth every 6 hours as needed for Pain for up to 15 days.  60 tablet 0    Magic Mouthwash (MIRACLE MOUTHWASH) Swish and spit 5 mLs 4 times daily as needed for Irritation or Pain 240 mL 3    BREO ELLIPTA 100-25 MCG/INH AEPB inhaler Inhale 1 puff into the lungs daily 30 each 0    OLANZapine (ZYPREXA) 5 MG tablet Take 1 tablet by mouth nightly 30 tablet 3    PROAIR  (90 Base) MCG/ACT inhaler Inhale 1 puff into the lungs 4 times daily 1 Inhaler 5    omeprazole (PRILOSEC) 40 MG delayed release capsule Take 1 capsule by mouth every morning (before breakfast) 30 capsule 3    ipratropium-albuterol (DUONEB) 0.5-2.5 (3) MG/3ML SOLN nebulizer solution Inhale 3 mLs into the lungs every 4 hours 540 mL 0     No current facility-administered medications for this encounter. * New    PHYSICAL EXAM:       ECO - Symptomatic, <50% in bed during the day (Ambulatory and capable of all self care but unable to carry out any work activities. Up and about more than 50% of waking hours)     General: NAD, AO x 3, Mentation is clear with appropriate affect. HEENT:  EOMI, oral mucosa without lesion or exudate, tongue mid-line  HEENT:  Lymphadenopathy in the right neck/SCV area stable to slightly decreased   Thorax:  Unlabored  Abdomen:  Non-distended    Chemotherapy Update: None    Treatment Imaging: [facility];    ASSESSMENT: Modest radiation side effects. Responding appropriately to symptomatic management. New medications, diagnostic results: Continue treatment as planned    PLAN: Again reviewed potential side effects of radiation for the patient's treatment. Continue local/topical care. Discussed magic mouthwash with patient, she has some financial constraints with this but we offered assistance and patient has declined stating that throat pain is fairly well controlled with oral opiates. Should this change she will let us know. Recent CT showed progression of disease, patient to discuss additional systemic therapy options with medical oncology upon completion of radiation therapy which is next week. Continue current radiation course as prescribed.

## 2022-03-21 DIAGNOSIS — J30.2 SEASONAL ALLERGIC RHINITIS, UNSPECIFIED TRIGGER: ICD-10-CM

## 2022-03-23 RX ORDER — FLUTICASONE PROPIONATE 50 MCG
SPRAY, SUSPENSION (ML) NASAL
Qty: 16 G | Refills: 0 | Status: SHIPPED | OUTPATIENT
Start: 2022-03-23 | End: 2022-06-21

## 2022-04-17 ENCOUNTER — HEALTH MAINTENANCE LETTER (OUTPATIENT)
Age: 52
End: 2022-04-17

## 2022-06-03 DIAGNOSIS — L30.9 CHRONIC DERMATITIS OF HANDS: ICD-10-CM

## 2022-06-03 DIAGNOSIS — B07.0 PLANTAR WART OF LEFT FOOT: ICD-10-CM

## 2022-06-03 DIAGNOSIS — L30.1 DYSHIDROTIC ECZEMA: ICD-10-CM

## 2022-06-03 NOTE — TELEPHONE ENCOUNTER
Requested Prescriptions   Pending Prescriptions Disp Refills     tacrolimus (PROTOPIC) 0.1 % external ointment 60 g 0     Sig: Apply topically 2 times daily       There is no refill protocol information for this order        Last office visit: 6/15/2021 with prescribing provider:  KISHOR HEATH    Future Office Visit:          David Rivers  [facility]

## 2022-06-07 RX ORDER — TACROLIMUS 1 MG/G
OINTMENT TOPICAL 2 TIMES DAILY
Qty: 60 G | Refills: 0 | Status: SHIPPED | OUTPATIENT
Start: 2022-06-07 | End: 2022-09-30

## 2022-06-20 DIAGNOSIS — J30.2 SEASONAL ALLERGIC RHINITIS, UNSPECIFIED TRIGGER: ICD-10-CM

## 2022-06-21 RX ORDER — FLUTICASONE PROPIONATE 50 MCG
SPRAY, SUSPENSION (ML) NASAL
Qty: 16 G | Refills: 0 | Status: SHIPPED | OUTPATIENT
Start: 2022-06-21 | End: 2024-02-20

## 2022-08-07 ENCOUNTER — HEALTH MAINTENANCE LETTER (OUTPATIENT)
Age: 52
End: 2022-08-07

## 2022-09-23 ENCOUNTER — HOSPITAL ENCOUNTER (OUTPATIENT)
Dept: MAMMOGRAPHY | Facility: CLINIC | Age: 52
Discharge: HOME | End: 2022-09-23
Attending: NURSE PRACTITIONER | Admitting: NURSE PRACTITIONER
Payer: COMMERCIAL

## 2022-09-23 DIAGNOSIS — Z12.31 VISIT FOR SCREENING MAMMOGRAM: ICD-10-CM

## 2022-09-23 PROCEDURE — 77067 SCR MAMMO BI INCL CAD: CPT

## 2022-09-30 ENCOUNTER — OFFICE VISIT (OUTPATIENT)
Dept: FAMILY MEDICINE | Facility: CLINIC | Age: 52
End: 2022-09-30
Payer: COMMERCIAL

## 2022-09-30 VITALS
OXYGEN SATURATION: 98 % | HEART RATE: 78 BPM | DIASTOLIC BLOOD PRESSURE: 66 MMHG | TEMPERATURE: 97.4 F | SYSTOLIC BLOOD PRESSURE: 108 MMHG | BODY MASS INDEX: 21.37 KG/M2 | WEIGHT: 141 LBS | HEIGHT: 68 IN | RESPIRATION RATE: 18 BRPM

## 2022-09-30 DIAGNOSIS — N95.2 VAGINAL ATROPHY: ICD-10-CM

## 2022-09-30 DIAGNOSIS — L30.9 CHRONIC DERMATITIS OF HANDS: ICD-10-CM

## 2022-09-30 DIAGNOSIS — L70.9 ACNE, UNSPECIFIED ACNE TYPE: ICD-10-CM

## 2022-09-30 DIAGNOSIS — Z87.440 PERSONAL HISTORY OF URINARY TRACT INFECTION: ICD-10-CM

## 2022-09-30 DIAGNOSIS — N95.1 MENOPAUSAL SYMPTOMS: ICD-10-CM

## 2022-09-30 DIAGNOSIS — L30.1 DYSHIDROTIC ECZEMA: ICD-10-CM

## 2022-09-30 DIAGNOSIS — Z90.710 S/P TAH (TOTAL ABDOMINAL HYSTERECTOMY): ICD-10-CM

## 2022-09-30 DIAGNOSIS — Z13.6 CARDIOVASCULAR SCREENING; LDL GOAL LESS THAN 160: ICD-10-CM

## 2022-09-30 DIAGNOSIS — J30.2 SEASONAL ALLERGIC RHINITIS, UNSPECIFIED TRIGGER: ICD-10-CM

## 2022-09-30 DIAGNOSIS — F43.21 GRIEF REACTION: ICD-10-CM

## 2022-09-30 DIAGNOSIS — Z13.1 SCREENING FOR DIABETES MELLITUS: ICD-10-CM

## 2022-09-30 DIAGNOSIS — Z00.00 ENCOUNTER FOR ROUTINE ADULT HEALTH EXAMINATION WITHOUT ABNORMAL FINDINGS: Primary | ICD-10-CM

## 2022-09-30 DIAGNOSIS — E55.9 VITAMIN D DEFICIENCY: ICD-10-CM

## 2022-09-30 LAB
CHOLEST SERPL-MCNC: 224 MG/DL
DEPRECATED CALCIDIOL+CALCIFEROL SERPL-MC: 30 UG/L (ref 20–75)
ESTRADIOL SERPL-MCNC: <5 PG/ML
FASTING STATUS PATIENT QL REPORTED: YES
FSH SERPL IRP2-ACNC: 66.8 MIU/ML
GLUCOSE SERPL-MCNC: 91 MG/DL (ref 70–99)
HDLC SERPL-MCNC: 79 MG/DL
LDLC SERPL CALC-MCNC: 134 MG/DL
NONHDLC SERPL-MCNC: 145 MG/DL
TRIGL SERPL-MCNC: 54 MG/DL

## 2022-09-30 PROCEDURE — 82947 ASSAY GLUCOSE BLOOD QUANT: CPT | Performed by: NURSE PRACTITIONER

## 2022-09-30 PROCEDURE — 99396 PREV VISIT EST AGE 40-64: CPT | Performed by: NURSE PRACTITIONER

## 2022-09-30 PROCEDURE — 80061 LIPID PANEL: CPT | Performed by: NURSE PRACTITIONER

## 2022-09-30 PROCEDURE — 99213 OFFICE O/P EST LOW 20 MIN: CPT | Mod: 25 | Performed by: NURSE PRACTITIONER

## 2022-09-30 PROCEDURE — 82306 VITAMIN D 25 HYDROXY: CPT | Performed by: NURSE PRACTITIONER

## 2022-09-30 PROCEDURE — 82670 ASSAY OF TOTAL ESTRADIOL: CPT | Performed by: NURSE PRACTITIONER

## 2022-09-30 PROCEDURE — 83001 ASSAY OF GONADOTROPIN (FSH): CPT | Performed by: NURSE PRACTITIONER

## 2022-09-30 PROCEDURE — 36415 COLL VENOUS BLD VENIPUNCTURE: CPT | Performed by: NURSE PRACTITIONER

## 2022-09-30 RX ORDER — FLUTICASONE PROPIONATE 50 MCG
1 SPRAY, SUSPENSION (ML) NASAL DAILY
Qty: 16 G | Refills: 11 | Status: SHIPPED | OUTPATIENT
Start: 2022-09-30 | End: 2023-11-27

## 2022-09-30 RX ORDER — NITROFURANTOIN 25; 75 MG/1; MG/1
CAPSULE ORAL
Qty: 20 CAPSULE | Refills: 1 | Status: SHIPPED | OUTPATIENT
Start: 2022-09-30 | End: 2024-03-19

## 2022-09-30 RX ORDER — ESTRADIOL 0.1 MG/G
2 CREAM VAGINAL
Qty: 42.5 G | Refills: 3 | Status: SHIPPED | OUTPATIENT
Start: 2022-10-03 | End: 2024-04-17

## 2022-09-30 RX ORDER — FLUTICASONE PROPIONATE 50 MCG
SPRAY, SUSPENSION (ML) NASAL
Qty: 16 G | Refills: 0 | Status: CANCELLED | OUTPATIENT
Start: 2022-09-30

## 2022-09-30 RX ORDER — TACROLIMUS 1 MG/G
OINTMENT TOPICAL 2 TIMES DAILY
Qty: 60 G | Refills: 0 | Status: SHIPPED | OUTPATIENT
Start: 2022-09-30 | End: 2022-12-20

## 2022-09-30 ASSESSMENT — ENCOUNTER SYMPTOMS
MYALGIAS: 0
ABDOMINAL PAIN: 0
WEAKNESS: 0
HEARTBURN: 0
DIARRHEA: 0
COUGH: 0
JOINT SWELLING: 0
ARTHRALGIAS: 0
HEMATOCHEZIA: 0
CONSTIPATION: 0
SORE THROAT: 0
FEVER: 0
HEADACHES: 0
DYSURIA: 0
NAUSEA: 0
PARESTHESIAS: 0
PALPITATIONS: 0
EYE PAIN: 0
CHILLS: 0
DIZZINESS: 0
NERVOUS/ANXIOUS: 0
HEMATURIA: 0
FREQUENCY: 0
SHORTNESS OF BREATH: 0

## 2022-09-30 ASSESSMENT — PAIN SCALES - GENERAL: PAINLEVEL: NO PAIN (0)

## 2022-09-30 NOTE — PROGRESS NOTES
SUBJECTIVE:   CC: Luna is an 51 year old who presents for preventive health visit.     Patient has been advised of split billing requirements and indicates understanding: Yes     Healthy Habits:     Getting at least 3 servings of Calcium per day:  Yes    Bi-annual eye exam:  Yes    Dental care twice a year:  Yes    Sleep apnea or symptoms of sleep apnea:  None    Diet:  Regular (no restrictions)    Frequency of exercise:  4-5 days/week    Duration of exercise:  15-30 minutes    Taking medications regularly:  Not Applicable    Medication side effects:  Not applicable    PHQ-2 Total Score: 0    Additional concerns today:  Yes    URINARY TRACT SYMPTOMS      Duration: months to a year    Description  Recurrent urinary tract infection after intercourse     Intensity:  mild    Accompanying signs and symptoms:  Fever/chills: no   Flank pain no   Nausea and vomiting: no   Vaginal symptoms: dyspareunia (pain in labia/pelvis with intercourse)  Abdominal/Pelvic Pain: no     History  History of frequent UTI's: YES  History of kidney stones: no   Sexually Active: YES  Possibility of pregnancy: No    Precipitating or alleviating factors: has had antibiotics in the past for treatment of urinary tract infection     Therapies tried and outcome:     Symptoms started post menopausal and have worsened.    Also noticing vaginal dryness     FH + for breast cancer in mother at age 46 - HRT contraindicated.  No other vaginal symptoms reported.  No concerns for STDs - same partner - .       Today's PHQ-2 Score:   PHQ-2 ( 1999 [employer]) 9/30/2022   Q1: Little interest or pleasure in doing things 0   Q2: Feeling down, depressed or hopeless 0   PHQ-2 Score 0   PHQ-2 Total Score (12-17 Years)- Positive if 3 or more points; Administer PHQ-A if positive -   Q1: Little interest or pleasure in doing things Not at all   Q2: Feeling down, depressed or hopeless Not at all   PHQ-2 Score 0       Abuse: Current or Past (Physical, Sexual or  Emotional) - No  Do you feel safe in your environment? Yes    Have you ever done Advance Care Planning? (For example, a Health Directive, POLST, or a discussion with a medical provider or your loved ones about your wishes): Yes, advance care planning is on file.    Social History     Tobacco Use     Smoking status: Never Smoker     Smokeless tobacco: Never Used   Substance Use Topics     Alcohol use: Yes     Comment: rarely       Alcohol Use 9/30/2022   Prescreen: >3 drinks/day or >7 drinks/week? No   Prescreen: >3 drinks/day or >7 drinks/week? -     Reviewed orders with patient.  Reviewed health maintenance and updated orders accordingly - Yes  Lab work is in process  Labs reviewed in EPIC  BP Readings from Last 3 Encounters:   09/30/22 108/66   06/15/21 126/76   06/04/21 90/60    Wt Readings from Last 3 Encounters:   09/30/22 64 kg (141 lb)   06/04/21 59.9 kg (132 lb)   03/12/21 62.8 kg (138 lb 6.4 oz)                  Patient Active Problem List   Diagnosis     Allergic rhinitis     Family history of breast cancer in mother     CARDIOVASCULAR SCREENING; LDL GOAL LESS THAN 160     Acne     Gastroesophageal reflux disease without esophagitis     Situational anxiety     Grief reaction     S/P TRACI (total abdominal hysterectomy)     Menopausal symptoms     Chronic dermatitis of hands     Vaginal atrophy     Past Surgical History:   Procedure Laterality Date     ARTHROSCOPY KNEE WITH MEDIAL MENISCECTOMY  06/20/2014    Procedure: ARTHROSCOPY KNEE WITH MEDIAL MENISCECTOMY;  Surgeon: Alejandro Dodge MD;  Location: [facility]     COLONOSCOPY N/A 12/20/2018    Procedure: COLONOSCOPY;  Surgeon: Jose Mora MD;  Location: [facility]     ESOPHAGOSCOPY, GASTROSCOPY, DUODENOSCOPY (EGD), COMBINED N/A 12/26/2016    Procedure: COMBINED ESOPHAGOSCOPY, GASTROSCOPY, DUODENOSCOPY (EGD), BIOPSY SINGLE OR MULTIPLE;  Surgeon: Jose Mora MD;  Location: [facility]     ESOPHAGOSCOPY, GASTROSCOPY, DUODENOSCOPY (EGD), COMBINED N/A 12/20/2018     Procedure: COMBINED ESOPHAGOSCOPY, GASTROSCOPY, DUODENOSCOPY (EGD);  Surgeon: Jose Mora MD;  Location: [facility] HYSTEROSCOPY, SURGICAL; W/ ENDOMETRIAL ABLATION, ANY METHOD  2010    Novasure ablation     HYSTERECTOMY TOTAL ABDOMINAL, BILATERAL SALPINGO-OOPHORECTOMY, COMBINED Bilateral 2018    TRACI only; tubes removed; ovaries left in     [facility]  DELIVERY ONLY  2002     [facility] LIGATE FALLOPIAN TUBE,POSTPARTUM         Social History     Tobacco Use     Smoking status: Never Smoker     Smokeless tobacco: Never Used   Substance Use Topics     Alcohol use: Yes     Comment: rarely     Family History   Problem Relation Age of Onset     Breast Cancer Mother         age 46     Cancer Mother         Throat, larynx (d/t radiation from breast cancer)tongue cancer 2011     Hypertension Father      Cancer Father         lung CA     Neurologic Disorder Maternal Grandmother         Parkinson's     C.A.D. Maternal Grandmother      Thyroid Disease Maternal Grandmother         hyperthyroid     Arthritis Maternal Grandfather      Heart Disease Maternal Grandfather      Osteoporosis Paternal Grandmother      Heart Disease Paternal Grandfather      Cancer - colorectal No family hx of      Prostate Cancer No family hx of      Cerebrovascular Disease No family hx of      Diabetes No family hx of      Asthma No family hx of          Current Outpatient Medications   Medication Sig Dispense Refill     [START ON 10/3/2022] estradiol (ESTRACE) 0.1 MG/GM vaginal cream Place 2 g vaginally twice a week 42.5 g 3     fluticasone (FLONASE) 50 MCG/ACT nasal spray Spray 1 spray into both nostrils daily 16 g 11     fluticasone (FLONASE) 50 MCG/ACT nasal spray Use 1 spray(s) in each nostril once daily 16 g 0     nitroFURantoin macrocrystal-monohydrate (MACROBID) 100 MG capsule Take 1 capsule with intercourse as needed for prevention of urinary tract infection 20 capsule 1     tacrolimus (PROTOPIC) 0.1 % external ointment  Apply topically 2 times daily Office visit required for further refills: [phone] 60 g 0     tretinoin (RETIN-A) 0.025 % external gel APPLY EXTERNALLY AT BEDTIME 45 g 7     hydrocortisone (ANUSOL-HC) 2.5 % cream Place rectally 2 times daily (Patient not taking: Reported on 9/30/2022) 28.35 g 1     Omeprazole (PRILOSEC PO) Take 20 mg by mouth daily as needed        Allergies   Allergen Reactions     Compazine Fatigue     Neurological s/s-can't wake up     Ancef [Cefazolin Sodium] Hives     Recent Labs   Lab Test 09/30/22  0938 03/12/21  1108 10/17/19  1102 11/01/18  1848 03/06/18  1910 10/24/16  1214 05/06/15  0858   * 108* 101*  --   --   --   --    HDL 79 105 82  --   --   --   --    TRIG 54 44 62  --   --   --   --    ALT  --   --  18 21  --   --  18   CR  --   --  0.76 0.88   < > 0.84 0.84   GFRESTIMATED  --   --  >90 68   < > 73 73   GFRESTBLACK  --   --  >90 82   < > 88 88   POTASSIUM  --   --  3.6 3.6   < > 4.1 3.9   TSH  --  1.45  --   --   --  1.71  --     < > = values in this interval not displayed.        Breast Cancer Screening:    FHS-7:   Breast CA Risk Assessment (FHS-7) 9/23/2022 9/30/2022   Did any of your first-degree relatives have breast or ovarian cancer? Yes Yes   Did any of your relatives have bilateral breast cancer? No No   Did any man in your family have breast cancer? No -   Did any woman in your family have breast and ovarian cancer? No -   Did any woman in your family have breast cancer before age 50 y? Yes -   Do you have 2 or more relatives with breast and/or ovarian cancer? No -   Do you have 2 or more relatives with breast and/or bowel cancer? No -       Mammogram Screening: Recommended annual mammography  Pertinent mammograms are reviewed under the imaging tab.    History of abnormal Pap smear: Status post benign hysterectomy. Health Maintenance and Surgical History updated.  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     Contact dermatitis and other eczema, due to unspecified cause      Enteritis due to [place] virus 2002     Herpes simplex without mention of complication     Last outbreak      Intramural and subserous leiomyoma of uterus 2018     Irritable bowel syndrome      PONV (postoperative nausea and vomiting)       Past Surgical History:   Procedure Laterality Date     ARTHROSCOPY KNEE WITH MEDIAL MENISCECTOMY  2014    Procedure: ARTHROSCOPY KNEE WITH MEDIAL MENISCECTOMY;  Surgeon: Alejandro Dodge MD;  Location: [facility]     COLONOSCOPY N/A 2018    Procedure: COLONOSCOPY;  Surgeon: Jose Mora MD;  Location: [facility]     ESOPHAGOSCOPY, GASTROSCOPY, DUODENOSCOPY (EGD), COMBINED N/A 2016    Procedure: COMBINED ESOPHAGOSCOPY, GASTROSCOPY, DUODENOSCOPY (EGD), BIOPSY SINGLE OR MULTIPLE;  Surgeon: Jose Mora MD;  Location: [facility]     ESOPHAGOSCOPY, GASTROSCOPY, DUODENOSCOPY (EGD), COMBINED N/A 2018    Procedure: COMBINED ESOPHAGOSCOPY, GASTROSCOPY, DUODENOSCOPY (EGD);  Surgeon: Jose Mora MD;  Location: [facility]     [facility] HYSTEROSCOPY, SURGICAL; W/ ENDOMETRIAL ABLATION, ANY METHOD  2010    Novasure ablation     HYSTERECTOMY TOTAL ABDOMINAL, BILATERAL SALPINGO-OOPHORECTOMY, COMBINED Bilateral 2018    TRACI only; tubes removed; ovaries left in     [facility]  DELIVERY ONLY  ,      [facility] LIGATE FALLOPIAN TUBE,POSTPARTUM       OB History    Para Term  AB Living   2 2 2 0 0 2   SAB IAB Ectopic Multiple Live Births   0 0 0 0 2      # Outcome Date GA Lbr Xavier/2nd Weight Sex Delivery Anes PTL Lv   2 Term 02 37w0d  3.827 kg (8  "lb 7 oz) M CS   KODI      Birth Comments: PTL, hyperemesis      Apgar1: 6  Apgar5: 8   1 Term 06/01/00 39w0d 38:00 3.544 kg (7 lb 13 oz) F CS   KODI      Birth Comments: Failure to progress, hyperemesis      Name: Leopoldo       Review of Systems   Constitutional: Negative for chills and fever.   HENT: Negative for congestion, ear pain, hearing loss and sore throat.    Eyes: Negative for pain and visual disturbance.   Respiratory: Negative for cough and shortness of breath.    Cardiovascular: Negative for chest pain, palpitations and peripheral edema.   Gastrointestinal: Negative for abdominal pain, constipation, diarrhea, heartburn, hematochezia and nausea.   Genitourinary: Negative for dysuria, frequency, genital sores, hematuria and urgency.   Musculoskeletal: Negative for arthralgias, joint swelling and myalgias.   Skin: Negative for rash.   Neurological: Negative for dizziness, weakness, headaches and paresthesias.   Psychiatric/Behavioral: Negative for mood changes. The patient is not nervous/anxious.      POS for grief reaction - lost mother this past year - dealing with loss and family concerns.  Doing well per patient, no SI thoughts.      OBJECTIVE:   /66   Pulse 78   Temp 97.4  F (36.3  C) (Tympanic)   Resp 18   Ht 1.727 m (5' 8\")   Wt 64 kg (141 lb)   LMP 02/25/2018   SpO2 98%   BMI 21.44 kg/m    Physical Exam  GENERAL: healthy, alert and no distress  EYES: Eyes grossly normal to inspection, PERRL and conjunctivae and sclerae normal  HENT: ear canals and TM's normal, nose and mouth without ulcers or lesions  NECK: no adenopathy, no asymmetry, masses, or scars and thyroid normal to palpation  RESP: lungs clear to auscultation - no rales, rhonchi or wheezes  BREAST: deferred  CV: regular rate and rhythm, normal S1 S2, no S3 or S4, no murmur, click or rub, no peripheral edema and peripheral pulses strong  ABDOMEN: soft, nontender, no hepatosplenomegaly, no masses and bowel sounds normal  MS: no " gross musculoskeletal defects noted, no edema  SKIN: no suspicious lesions or rashes  NEURO: Normal strength and tone, mentation intact and speech normal  PSYCH: mentation appears normal, affect normal/bright    Diagnostic Test Results:  Labs reviewed in Epic    ASSESSMENT/PLAN:   (Z00.00) Encounter for routine adult health examination without abnormal findings  (primary encounter diagnosis)  Comment:    Plan:      (N95.2) Vaginal atrophy  Comment:    Plan: estradiol (ESTRACE) 0.1 MG/GM vaginal cream        Discussed treatment options.  Given recurrent urinary tract infection symptoms and vaginal symptoms, dyspareunia would treat with lose dose vaginal cream.  Discussed risks and side effects.    (Z87.440) Personal history of urinary tract infection  Comment:    Plan: nitroFURantoin macrocrystal-monohydrate         (MACROBID) 100 MG capsule           Macrobid post intercourse - prophylaxis for urinary tract infection     (J30.2) Seasonal allergic rhinitis, unspecified trigger  Comment:    Plan: fluticasone (FLONASE) 50 MCG/ACT nasal spray             (F43.21) Grief reaction  Comment:    Plan: Improving.    (L70.9) Acne, unspecified acne type  Comment:    Plan:      (L30.9) Chronic dermatitis of hands  Comment:    Plan: tacrolimus (PROTOPIC) 0.1 % external ointment             (L30.1) Dyshidrotic eczema  Comment:    Plan: tacrolimus (PROTOPIC) 0.1 % external ointment             (Z90.710) S/P TRACI (total abdominal hysterectomy)  Comment:    Plan:      (Z13.6) CARDIOVASCULAR SCREENING; LDL GOAL LESS THAN 160  Comment:    Plan: Lipid panel reflex to direct LDL Fasting             (Z13.1) Screening for diabetes mellitus  Comment:    Plan: Glucose             (N95.1) Menopausal symptoms  Comment:    Plan: Estradiol, Follicle stimulating hormone         Requesting testing - s/p hysterectomy - ovaries intact.    (E55.9) Vitamin D deficiency  Comment:    Plan: Vitamin D Deficiency               Patient has been advised of  "split billing requirements and indicates understanding: Yes    COUNSELING:  Reviewed preventive health counseling, as reflected in patient instructions       Regular exercise       Healthy diet/nutrition       Vision screening       Osteoporosis prevention/bone health       Colorectal Cancer Screening    Estimated body mass index is 21.44 kg/m  as calculated from the following:    Height as of this encounter: 1.727 m (5' 8\").    Weight as of this encounter: 64 kg (141 lb).        She reports that she has never smoked. She has never used smokeless tobacco.      Counseling Resources:  ATP IV Guidelines  Pooled Cohorts Equation Calculator  Breast Cancer Risk Calculator  BRCA-Related Cancer Risk Assessment: FHS-7 Tool  FRAX Risk Assessment  ICSI Preventive Guidelines  Dietary Guidelines for Americans, 2010  USDA's MyPlate  ASA Prophylaxis  Lung CA Screening    Catrina Jin NP  [facility]  "

## 2022-09-30 NOTE — LETTER
October 4, 2022      Luna Rivera  [id number] [facility] [address]        Dear ,    We are writing to inform you of your test results.    Luna,     Your Vitamin D is good but low end of normal. Recommend continuing your over the counter maintenance dosing 600-800 international unit(s)   Your estradiol and LH levels indicate post menopausal syndrome.       Resulted Orders   Lipid panel reflex to direct LDL Fasting   Result Value Ref Range    Cholesterol 224 (H) <200 mg/dL    Triglycerides 54 <150 mg/dL    Direct Measure HDL 79 >=50 mg/dL    LDL Cholesterol Calculated 134 (H) <=100 mg/dL    Non HDL Cholesterol 145 (H) <130 mg/dL    Narrative    Cholesterol  Desirable:  <200 mg/dL    Triglycerides  Normal:  Less than 150 mg/dL  Borderline High:  150-199 mg/dL  High:  200-499 mg/dL  Very High:  Greater than or equal to 500 mg/dL    Direct Measure HDL  Female:  Greater than or equal to 50 mg/dL   Male:  Greater than or equal to 40 mg/dL    LDL Cholesterol  Desirable:  <100mg/dL  Above Desirable:  100-129 mg/dL   Borderline High:  130-159 mg/dL   High:  160-189 mg/dL   Very High:  >= 190 mg/dL    Non HDL Cholesterol  Desirable:  130 mg/dL  Above Desirable:  130-159 mg/dL  Borderline High:  160-189 mg/dL  High:  190-219 mg/dL  Very High:  Greater than or equal to 220 mg/dL   Glucose   Result Value Ref Range    Glucose 91 70 - 99 mg/dL    Patient Fasting > 8hrs? Yes    Estradiol   Result Value Ref Range    Estradiol <5 pg/mL      Comment:      Healthy Men:   11.3-43.2 pg/mL    Healthy Postmenopausal Women:  Postmenopause: <5-138 pg/mL    Healthy Pregnant Women:  1st trimester: 154-3243 pg/mL  2nd trimester: 1561-43922 pg/mL  3rd trimester: 8525->07569 pg/mL    Healthy Women Cycle Phase:  Follicular: 30.9-90.4 pg/mL  Ovulation: 60.4-533 pg/mL  Luteal: 60.4-232 pg/mL    Healthy Women Cycle Sub-Phase:  Early Follicular: 20.5-62.8 pg/mL  Intermediate Follicular: 26-79.8 pg/mL  Late Follicular:  49.5-233 pg/mL  Ovulation: 60.4-602 pg/mL  Early Luteal: 51.1-179 pg/mL  Intermediate Luteal: 66.5-305 pg/mL  Late Luteal: 30.2-222 pg/mL   Follicle stimulating hormone   Result Value Ref Range    FSH 66.8 mIU/mL      Comment:      19 years and older:   Follicular phase: 3.5-12.5 mIU/mL   Ovulation phase: 4.7-21.5 mIU/mL   Luteal phase: 1.7-7.7 mIU/mL   Postmenopause: 25.8-134.8 mIU/mL      Vitamin D Deficiency   Result Value Ref Range    Vitamin D, Total (25-Hydroxy) 30 20 - 75 ug/L    Narrative    Season, race, dietary intake, and treatment affect the concentration of 25-hydroxy-Vitamin D. Values may decrease during winter months and increase during summer months. Values 20-29 ug/L may indicate Vitamin D insufficiency and values <20 ug/L may indicate Vitamin D deficiency.    Vitamin D determination is routinely performed by an immunoassay specific for 25 hydroxyvitamin D3.  If an individual is on vitamin D2(ergocalciferol) supplementation, please specify 25 OH vitamin D2 and D3 level determination by LCMSMS test [id number].         If you have any questions or concerns, please call the clinic at the number listed above.       Sincerely,      Catrina Jin NP

## 2022-10-04 ENCOUNTER — TELEPHONE (OUTPATIENT)
Dept: FAMILY MEDICINE | Facility: CLINIC | Age: 52
End: 2022-10-04

## 2022-10-05 NOTE — TELEPHONE ENCOUNTER
Prior Authorization Retail Medication Request    Medication/Dose: Tretinoin gel  ICD code (if different than what is on RX):    Previously Tried and Failed:  Diprolene; retin-a; protopic;   Rationale:      Insurance Name:  Not provided  Insurance ID:  Not provided  [facility]: [id number]      Pharmacy Information (if different than what is on RX)  Name:    Phone:

## 2022-10-05 NOTE — TELEPHONE ENCOUNTER
Central Prior Authorization Team  Phone: [phone]    PA Initiation    Medication: Tretinoin gel  Insurance Company: [employer] - Phone [phone] Fax [phone]  Pharmacy Filling the Rx: [facility] [address] [place] [address] - [address]  Filling Pharmacy Phone: [phone]  Filling Pharmacy Fax:    Start Date: 10/5/2022

## 2022-10-05 NOTE — TELEPHONE ENCOUNTER
Central Prior Authorization Team  Phone: [phone]    Prior Authorization Approval    Authorization Effective Date: 10/5/2022  Authorization Expiration Date: 10/3/2023  Medication: Tretinoin gel  Approved Dose/Quantity:   Reference #: [id number]   Insurance Company: [employer] - Phone [phone] Fax [phone]  Expected CoPay:       CoPay Card Available:      Foundation Assistance Needed:    Which Pharmacy is filling the prescription (Not needed for infusion/clinic administered): [facility] [address] [place] [address] - [facility] [address]  Pharmacy Notified: Yes  Patient Notified: Yes

## 2022-10-23 ENCOUNTER — HEALTH MAINTENANCE LETTER (OUTPATIENT)
Age: 52
End: 2022-10-23

## 2022-11-02 ENCOUNTER — TELEPHONE (OUTPATIENT)
Dept: FAMILY MEDICINE | Facility: CLINIC | Age: 52
End: 2022-11-02

## 2022-11-02 DIAGNOSIS — L98.9 SKIN LESION: Primary | ICD-10-CM

## 2022-11-02 NOTE — TELEPHONE ENCOUNTER
Reason for Call:  Other concerning spot on nose     Detailed comments: Has a derm apt on 12/20 regarding a concerning spot on nose. Wants to be seen sooner. They suggested she contact primary to get a referral to get seen sooner. Shantelle call Chantale back to discuss.    Phone Number Patient can be reached at: Cell number on file:    Telephone Information:   Mobile [phone]       Best Time: any    Can we leave a detailed message on this number? YES    Call taken on 11/2/2022 at 8:57 AM by Idania Pugh      [phone]

## 2022-11-03 NOTE — TELEPHONE ENCOUNTER
I know dermatology here at [facility] is booked out 3 months.  I placed a referral for priority to see if this helps.    Catrina Jin, ARNALDOP

## 2022-11-04 ENCOUNTER — OFFICE VISIT (OUTPATIENT)
Dept: DERMATOLOGY | Facility: CLINIC | Age: 52
End: 2022-11-04
Attending: NURSE PRACTITIONER
Payer: COMMERCIAL

## 2022-11-04 DIAGNOSIS — D22.9 NEVUS: ICD-10-CM

## 2022-11-04 DIAGNOSIS — L57.0 ACTINIC KERATOSIS: Primary | ICD-10-CM

## 2022-11-04 DIAGNOSIS — L81.4 LENTIGO: ICD-10-CM

## 2022-11-04 PROCEDURE — 99212 OFFICE O/P EST SF 10 MIN: CPT | Mod: 25 | Performed by: PHYSICIAN ASSISTANT

## 2022-11-04 PROCEDURE — 17000 DESTRUCT PREMALG LESION: CPT | Performed by: PHYSICIAN ASSISTANT

## 2022-11-04 NOTE — PROGRESS NOTES
HPI:   Chief complaints: Luna Rivera is a pleasant 52 year old female who presents for evaluation of a red scaly spot on the nose. The spot has been there for over 1 month and will not heal. It is not painful.       PHYSICAL EXAM:    LMP 02/25/2018   Skin exam performed as follows: Type 2 skin. Mood appropriate  Alert and Oriented X 3. Well developed, well nourished in no distress.  General appearance: Normal  Head including face: Normal  Eyes: conjunctiva and lids: Normal  Mouth: Lips, teeth, gums: Normal  Neck: Normal  Cardiovascular: Exam of peripheral vascular system by observation for swelling, varicosities, edema: Normal  Right upper extremity: Normal  Left upper extremity: Normal  Right lower extremity: Normal  Left lower extremity: Normal  Skin: Scalp and body hair: See below    Pink gritty papule on the left nasal side wall x 1  Brown and tan macules and papules on the back    ASSESSMENT/PLAN:     1. Actinic keratosis on the left nasal side wall x 1. As precancerous, cryosurgery performed. Advised on blistering and post-op care. Advised if not resolved in 1-2 months to return for evaluation  2. Nevi, lentigos on the back - advised benign no treatment needed          Follow-up: regular FSE/PRN sooner  CC:   Scribed By: Rosalia Pabon MS, PA-C

## 2022-11-10 ENCOUNTER — OFFICE VISIT (OUTPATIENT)
Dept: DERMATOLOGY | Facility: CLINIC | Age: 52
End: 2022-11-10
Payer: COMMERCIAL

## 2022-11-10 DIAGNOSIS — L73.8 SEBACEOUS HYPERPLASIA: ICD-10-CM

## 2022-11-10 DIAGNOSIS — D18.01 CHERRY ANGIOMA: Primary | ICD-10-CM

## 2022-11-10 PROCEDURE — 99212 OFFICE O/P EST SF 10 MIN: CPT | Mod: 24 | Performed by: PHYSICIAN ASSISTANT

## 2022-11-10 PROCEDURE — 96999 UNLISTED SPEC DERM SVC/PX: CPT | Performed by: PHYSICIAN ASSISTANT

## 2022-11-10 ASSESSMENT — PAIN SCALES - GENERAL: PAINLEVEL: NO PAIN (0)

## 2022-11-10 NOTE — LETTER
11/10/2022         RE: Luna Rivera  [address] [facility] [address]        Dear Colleague,    Thank you for referring your patient, Luna Rivera, to the [facility]. Please see a copy of my visit note below.    Luna Rivera is an extremely pleasant 52 year old year old female patient here today for spot on face and chest. No pain or bleeding. She would like to discuss treatment options. .  Patient has no other skin complaints today.  Remainder of the HPI, Meds, PMH, Allergies, FH, and SH was reviewed in chart.    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     Contact dermatitis and other eczema, due to unspecified cause      Enteritis due to [place] virus 12/2002     Herpes simplex without mention of complication     Last outbreak 1/05     Intramural and subserous leiomyoma of uterus 03/19/2018     Irritable bowel syndrome      PONV (postoperative nausea and vomiting)        Past Surgical History:   Procedure Laterality Date     ARTHROSCOPY KNEE WITH MEDIAL MENISCECTOMY  06/20/2014    Procedure: ARTHROSCOPY KNEE WITH MEDIAL MENISCECTOMY;  Surgeon: Alejandro Dodge MD;  Location: [facility]     COLONOSCOPY N/A 12/20/2018    Procedure: COLONOSCOPY;  Surgeon: Jose Mora MD;  Location: [facility]     ESOPHAGOSCOPY, GASTROSCOPY, DUODENOSCOPY (EGD), COMBINED N/A 12/26/2016    Procedure: COMBINED ESOPHAGOSCOPY, GASTROSCOPY, DUODENOSCOPY (EGD), BIOPSY SINGLE OR MULTIPLE;  Surgeon: Jose Mora MD;  [facility]: [facility]     ESOPHAGOSCOPY, GASTROSCOPY, DUODENOSCOPY (EGD), COMBINED N/A 12/20/2018    Procedure: COMBINED ESOPHAGOSCOPY, GASTROSCOPY, DUODENOSCOPY (EGD);  Surgeon: Jose Mora MD;  Location: [facility]     HC HYSTEROSCOPY, SURGICAL; W/ ENDOMETRIAL ABLATION, ANY METHOD  03/30/2010    Novasure ablation     HYSTERECTOMY TOTAL ABDOMINAL, BILATERAL SALPINGO-OOPHORECTOMY,  COMBINED Bilateral 2018    TRACI only; tubes removed; ovaries left in     [facility]  DELIVERY ONLY  ,      [facility] LIGATE FALLOPIAN TUBE,POSTPARTUM          Family History   Problem Relation Age of Onset     Breast Cancer Mother         age 46     Cancer Mother         Throat, larynx (d/t radiation from breast cancer)tongue cancer 2011     Hypertension Father      Cancer Father         lung CA     Neurologic Disorder Maternal Grandmother         Parkinson's     C.A.D. Maternal Grandmother      Thyroid Disease Maternal Grandmother         hyperthyroid     Arthritis Maternal Grandfather      Heart Disease Maternal Grandfather      Osteoporosis Paternal Grandmother      Heart Disease Paternal Grandfather      Cancer - colorectal No family hx of      Prostate Cancer No family hx of      Cerebrovascular Disease No family hx of      Diabetes No family hx of      Asthma No family hx of        Social History     Socioeconomic History     Marital status:      Spouse name: Not on file     Number of children: 2     Years of education: Not on file     Highest education level: Not on file   Occupational History     Occupation: Stay at Home Mom     Employer: [employer]     Occupation:  prn   Tobacco Use     Smoking status: Never     Smokeless tobacco: Never   Vaping Use     Vaping Use: Never used   Substance and Sexual Activity     Alcohol use: Yes     Comment: rarely     Drug use: No     Sexual activity: Yes     Partners: Male     Birth control/protection: Female Surgical     Comment: BTL   Other Topics Concern     Parent/sibling w/ CABG, MI or angioplasty before 65F 55M? No   Social History Narrative    Home with kids                         Social Determinants of Health     Financial Resource Strain: Not on file   Food Insecurity: Not on file   Transportation Needs: Not on file   Physical Activity: Not on file   Stress: Not on file   Social Connections: Not on file   Intimate  Partner Violence: Not on file   Housing Stability: Not on file       Outpatient Encounter Medications as of 11/10/2022   Medication Sig Dispense Refill     estradiol (ESTRACE) 0.1 MG/GM vaginal cream Place 2 g vaginally twice a week 42.5 g 3     fluticasone (FLONASE) 50 MCG/ACT nasal spray Spray 1 spray into both nostrils daily 16 g 11     fluticasone (FLONASE) 50 MCG/ACT nasal spray Use 1 spray(s) in each nostril once daily 16 g 0     hydrocortisone (ANUSOL-HC) 2.5 % cream Place rectally 2 times daily 28.35 g 1     nitroFURantoin macrocrystal-monohydrate (MACROBID) 100 MG capsule Take 1 capsule with intercourse as needed for prevention of urinary tract infection 20 capsule 1     tacrolimus (PROTOPIC) 0.1 % external ointment Apply topically 2 times daily Office visit required for further refills: [phone] 60 g 0     tretinoin (RETIN-A) 0.025 % external gel APPLY EXTERNALLY AT BEDTIME 45 g 7     Omeprazole (PRILOSEC PO) Take 20 mg by mouth daily as needed        No facility-administered encounter medications on file as of 11/10/2022.             O:   NAD, WDWN, Alert & Oriented, Mood & Affect wnl, Vitals stable   Here today alone   LMP 02/25/2018    General appearance normal   Vitals stable   Alert, oriented and in no acute distress     Yellow lobulated papules on face   Red papules on chest and abdomen     Eyes: Conjunctivae/lids:Normal     ENT: Lips normal    MSK:Normal    Pulm: Breathing Normal    Neuro/Psych: Orientation:Alert and Orientedx3 ; Mood/Affect:normal   A/P:  1. Sebaceous hyperplasia on cheeks and temple x6  With patient consent treated with hyfrecator on setting of 4. Routine wound care.  2. Cherry angioma on abdomen, chest x 9   With patient consent treated with hyfrecator on setting of 4. Routine wound care.  Cosmetic charge of $150      Again, thank you for allowing me to participate in the care of your patient.        Sincerely,        Luna Roldan PA-C

## 2022-11-12 NOTE — PROGRESS NOTES
Luna Rivera is an extremely pleasant 52 year old year old female patient here today for spot on face and chest. No pain or bleeding. She would like to discuss treatment options. .  Patient has no other skin complaints today.  Remainder of the HPI, Meds, PMH, Allergies, FH, and SH was reviewed in chart.    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     Contact dermatitis and other eczema, due to unspecified cause      Enteritis due to [place] virus 2002     Herpes simplex without mention of complication     Last outbreak      Intramural and subserous leiomyoma of uterus 2018     Irritable bowel syndrome      PONV (postoperative nausea and vomiting)        Past Surgical History:   Procedure Laterality Date     ARTHROSCOPY KNEE WITH MEDIAL MENISCECTOMY  2014    Procedure: ARTHROSCOPY KNEE WITH MEDIAL MENISCECTOMY;  Surgeon: Alejandro Dodge MD;  Location: [facility]     COLONOSCOPY N/A 2018    Procedure: COLONOSCOPY;  Surgeon: Jose Mora MD;  [facility]: [facility]     ESOPHAGOSCOPY, GASTROSCOPY, DUODENOSCOPY (EGD), COMBINED N/A 2016    Procedure: COMBINED ESOPHAGOSCOPY, GASTROSCOPY, DUODENOSCOPY (EGD), BIOPSY SINGLE OR MULTIPLE;  Surgeon: Jose Mora MD;  [facility]: [facility]     ESOPHAGOSCOPY, GASTROSCOPY, DUODENOSCOPY (EGD), COMBINED N/A 2018    Procedure: COMBINED ESOPHAGOSCOPY, GASTROSCOPY, DUODENOSCOPY (EGD);  Surgeon: Jose Mora MD;  Location: [facility]     HC HYSTEROSCOPY, SURGICAL; W/ ENDOMETRIAL ABLATION, ANY METHOD  2010    Novasure ablation     HYSTERECTOMY TOTAL ABDOMINAL, BILATERAL SALPINGO-OOPHORECTOMY, COMBINED Bilateral 2018    TRACI only; tubes removed; ovaries left in     [facility]  DELIVERY ONLY  2002     [facility] LIGATE FALLOPIAN TUBE,POSTPARTUM          Family History   Problem Relation Age of Onset     Breast Cancer Mother         age 46      Cancer Mother         Throat, larynx (d/t radiation from breast cancer)tongue cancer 9/2011     Hypertension Father      Cancer Father         lung CA     Neurologic Disorder Maternal Grandmother         Parkinson's     C.A.D. Maternal Grandmother      Thyroid Disease Maternal Grandmother         hyperthyroid     Arthritis Maternal Grandfather      Heart Disease Maternal Grandfather      Osteoporosis Paternal Grandmother      Heart Disease Paternal Grandfather      Cancer - colorectal No family hx of      Prostate Cancer No family hx of      Cerebrovascular Disease No family hx of      Diabetes No family hx of      Asthma No family hx of        Social History     Socioeconomic History     Marital status:      Spouse name: Not on file     Number of children: 2     Years of education: Not on file     Highest education level: Not on file   Occupational History     Occupation: Stay at Home Mom     Employer: [employer]     Occupation:  prn   Tobacco Use     Smoking status: Never     Smokeless tobacco: Never   Vaping Use     Vaping Use: Never used   Substance and Sexual Activity     Alcohol use: Yes     Comment: rarely     Drug use: No     Sexual activity: Yes     Partners: Male     Birth control/protection: Female Surgical     Comment: BTL   Other Topics Concern     Parent/sibling w/ CABG, MI or angioplasty before 65F 55M? No   Social History Narrative    Home with kids                         Social Determinants of Health     Financial Resource Strain: Not on file   Food Insecurity: Not on file   Transportation Needs: Not on file   Physical Activity: Not on file   Stress: Not on file   Social Connections: Not on file   Intimate Partner Violence: Not on file   Housing Stability: Not on file       Outpatient Encounter Medications as of 11/10/2022   Medication Sig Dispense Refill     estradiol (ESTRACE) 0.1 MG/GM vaginal cream Place 2 g vaginally twice a week 42.5 g 3     fluticasone  (FLONASE) 50 MCG/ACT nasal spray Spray 1 spray into both nostrils daily 16 g 11     fluticasone (FLONASE) 50 MCG/ACT nasal spray Use 1 spray(s) in each nostril once daily 16 g 0     hydrocortisone (ANUSOL-HC) 2.5 % cream Place rectally 2 times daily 28.35 g 1     nitroFURantoin macrocrystal-monohydrate (MACROBID) 100 MG capsule Take 1 capsule with intercourse as needed for prevention of urinary tract infection 20 capsule 1     tacrolimus (PROTOPIC) 0.1 % external ointment Apply topically 2 times daily Office visit required for further refills: [phone] 60 g 0     tretinoin (RETIN-A) 0.025 % external gel APPLY EXTERNALLY AT BEDTIME 45 g 7     Omeprazole (PRILOSEC PO) Take 20 mg by mouth daily as needed        No facility-administered encounter medications on file as of 11/10/2022.             O:   NAD, WDWN, Alert & Oriented, Mood & Affect wnl, Vitals stable   Here today alone   LMP 02/25/2018    General appearance normal   Vitals stable   Alert, oriented and in no acute distress     Yellow lobulated papules on face   Red papules on chest and abdomen     Eyes: Conjunctivae/lids:Normal     ENT: Lips normal    MSK:Normal    Pulm: Breathing Normal    Neuro/Psych: Orientation:Alert and Orientedx3 ; Mood/Affect:normal   A/P:  1. Sebaceous hyperplasia on cheeks and temple x6  With patient consent treated with hyfrecator on setting of 4. Routine wound care.  2. Cherry angioma on abdomen, chest x 9   With patient consent treated with hyfrecator on setting of 4. Routine wound care.  Cosmetic charge of $150

## 2022-12-20 ENCOUNTER — OFFICE VISIT (OUTPATIENT)
Dept: DERMATOLOGY | Facility: CLINIC | Age: 52
End: 2022-12-20
Payer: COMMERCIAL

## 2022-12-20 ENCOUNTER — TELEPHONE (OUTPATIENT)
Dept: DERMATOLOGY | Facility: CLINIC | Age: 52
End: 2022-12-20

## 2022-12-20 DIAGNOSIS — L30.9 CHRONIC DERMATITIS OF HANDS: ICD-10-CM

## 2022-12-20 DIAGNOSIS — L30.1 DYSHIDROTIC ECZEMA: ICD-10-CM

## 2022-12-20 DIAGNOSIS — L82.1 SEBORRHEIC KERATOSIS: ICD-10-CM

## 2022-12-20 DIAGNOSIS — L81.4 LENTIGO: ICD-10-CM

## 2022-12-20 DIAGNOSIS — L30.1 DYSHIDROTIC ECZEMA: Primary | ICD-10-CM

## 2022-12-20 DIAGNOSIS — D18.01 CHERRY ANGIOMA: ICD-10-CM

## 2022-12-20 DIAGNOSIS — D22.9 MULTIPLE BENIGN NEVI: ICD-10-CM

## 2022-12-20 PROCEDURE — 99213 OFFICE O/P EST LOW 20 MIN: CPT | Performed by: PHYSICIAN ASSISTANT

## 2022-12-20 RX ORDER — TACROLIMUS 1 MG/G
OINTMENT TOPICAL 2 TIMES DAILY
Qty: 60 G | Refills: 8 | Status: SHIPPED | OUTPATIENT
Start: 2022-12-20 | End: 2024-04-09

## 2022-12-20 RX ORDER — CLOBETASOL PROPIONATE 0.5 MG/G
CREAM TOPICAL
Qty: 30 G | Refills: 3 | Status: SHIPPED | OUTPATIENT
Start: 2022-12-20 | End: 2022-12-20

## 2022-12-20 RX ORDER — CLOBETASOL PROPIONATE 0.5 MG/G
OINTMENT TOPICAL
Qty: 30 G | Refills: 3 | Status: SHIPPED | OUTPATIENT
Start: 2022-12-20

## 2022-12-20 ASSESSMENT — PAIN SCALES - GENERAL: PAINLEVEL: MODERATE PAIN (4)

## 2022-12-20 NOTE — TELEPHONE ENCOUNTER
[facility] Call Center    Phone Message    May a detailed message be left on voicemail: no     Reason for Call: Medication Question or concern regarding medication   Prescription Clarification  Name of Medication: clobetasol (TEMOVATE) 0.05 % external cream  Prescribing Provider: Luna Tijerina PA-C   Pharmacy: [facility] [place], [place] - [address]   What on the order needs clarification? Pt would like to switch the clobetasol (TEMOVATE) 0.05 % external cream from a cream to an ointment because the cream is alcohol based and stings her open sores.     Please call Pt back to discuss. Thank you.           Action Taken: Other: [facility]    Travel Screening: Not Applicable

## 2022-12-20 NOTE — NURSING NOTE
Chief Complaint   Patient presents with     Skin Check     No Concerns        There were no vitals filed for this visit.  Wt Readings from Last 1 Encounters:   09/30/22 64 kg (141 lb)       Georgie Mary LPN .................12/20/2022

## 2022-12-20 NOTE — LETTER
12/20/2022         RE: Luna Rivera  [address]  [facility] [address]        Dear Colleague,    Thank you for referring your patient, Luna Rivera, to the [facility]. Please see a copy of my visit note below.    Luna Rivera is an extremely pleasant 52 year old year old female patient here today for skin check and eczema.she notes eczema has flared thee past few months she notes protopic does help but still present.  Patient has no other skin complaints today.  Remainder of the HPI, Meds, PMH, Allergies, FH, and SH was reviewed in chart.    Pertinent Hx:   No personal history of skin cancer.   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     Contact dermatitis and other eczema, due to unspecified cause      Enteritis due to [place] virus 12/2002     Herpes simplex without mention of complication     Last outbreak 1/05     Intramural and subserous leiomyoma of uterus 03/19/2018     Irritable bowel syndrome      PONV (postoperative nausea and vomiting)        Past Surgical History:   Procedure Laterality Date     ARTHROSCOPY KNEE WITH MEDIAL MENISCECTOMY  06/20/2014    Procedure: ARTHROSCOPY KNEE WITH MEDIAL MENISCECTOMY;  Surgeon: Alejandro Dodge MD;  Location: [facility]     COLONOSCOPY N/A 12/20/2018    Procedure: COLONOSCOPY;  Surgeon: Jose Mora MD;  [facility]     ESOPHAGOSCOPY, GASTROSCOPY, DUODENOSCOPY (EGD), COMBINED N/A 12/26/2016    Procedure: COMBINED ESOPHAGOSCOPY, GASTROSCOPY, DUODENOSCOPY (EGD), BIOPSY SINGLE OR MULTIPLE;  Surgeon: Jose Mora MD;  Location: [facility]     ESOPHAGOSCOPY, GASTROSCOPY, DUODENOSCOPY (EGD), COMBINED N/A 12/20/2018    Procedure: COMBINED ESOPHAGOSCOPY, GASTROSCOPY, DUODENOSCOPY (EGD);  Surgeon: Jose Mora MD;  Location: [facility]     HC HYSTEROSCOPY, SURGICAL; W/ ENDOMETRIAL ABLATION, ANY METHOD  03/30/2010    [facility]  ablation     HYSTERECTOMY TOTAL ABDOMINAL, BILATERAL SALPINGO-OOPHORECTOMY, COMBINED Bilateral 2018    TRACI only; tubes removed; ovaries left in     [facility]  DELIVERY ONLY  2002     [facility] LIGATE FALLOPIAN TUBE,POSTPARTUM          Family History   Problem Relation Age of Onset     Breast Cancer Mother         age 46     Cancer Mother         Throat, larynx (d/t radiation from breast cancer)tongue cancer 2011     Hypertension Father      Cancer Father         lung CA     Neurologic Disorder Maternal Grandmother         Parkinson's     C.A.D. Maternal Grandmother      Thyroid Disease Maternal Grandmother         hyperthyroid     Arthritis Maternal Grandfather      Heart Disease Maternal Grandfather      Osteoporosis Paternal Grandmother      Heart Disease Paternal Grandfather      Cancer - colorectal No family hx of      Prostate Cancer No family hx of      Cerebrovascular Disease No family hx of      Diabetes No family hx of      Asthma No family hx of        Social History     Socioeconomic History     Marital status:      Spouse name: Not on file     Number of children: 2     Years of education: Not on file     Highest education level: Not on file   Occupational History     Occupation: Stay at Home Mom     Employer: [employer]     Occupation:  prn   Tobacco Use     Smoking status: Never     Smokeless tobacco: Never   Vaping Use     Vaping Use: Never used   Substance and Sexual Activity     Alcohol use: Yes     Comment: rarely     Drug use: No     Sexual activity: Yes     Partners: Male     Birth control/protection: Female Surgical     Comment: BTL   Other Topics Concern     Parent/sibling w/ CABG, MI or angioplasty before 65F 55M? No   Social History Narrative    Home with kids                         Social Determinants of Health     Financial Resource Strain: Not on file   Food Insecurity: Not on file   Transportation Needs: Not on file   Physical Activity: Not on  file   Stress: Not on file   Social Connections: Not on file   Intimate Partner Violence: Not on file   Housing Stability: Not on file       Outpatient Encounter Medications as of 12/20/2022   Medication Sig Dispense Refill     estradiol (ESTRACE) 0.1 MG/GM vaginal cream Place 2 g vaginally twice a week 42.5 g 3     fluticasone (FLONASE) 50 MCG/ACT nasal spray Spray 1 spray into both nostrils daily 16 g 11     fluticasone (FLONASE) 50 MCG/ACT nasal spray Use 1 spray(s) in each nostril once daily 16 g 0     hydrocortisone (ANUSOL-HC) 2.5 % cream Place rectally 2 times daily 28.35 g 1     nitroFURantoin macrocrystal-monohydrate (MACROBID) 100 MG capsule Take 1 capsule with intercourse as needed for prevention of urinary tract infection 20 capsule 1     tacrolimus (PROTOPIC) 0.1 % external ointment Apply topically 2 times daily 60 g 8     tretinoin (RETIN-A) 0.025 % external gel APPLY EXTERNALLY AT BEDTIME 45 g 7     [DISCONTINUED] clobetasol (TEMOVATE) 0.05 % external cream Apply sparingly twice daily for next 1-2 weeks. 30 g 3     Omeprazole (PRILOSEC PO) Take 20 mg by mouth daily as needed        [DISCONTINUED] tacrolimus (PROTOPIC) 0.1 % external ointment Apply topically 2 times daily Office visit required for further refills: [phone] 60 g 0     No facility-administered encounter medications on file as of 12/20/2022.             O:   NAD, WDWN, Alert & Oriented, Mood & Affect wnl, Vitals stable   Here today alone   LMP 02/25/2018    General appearance normal   Vitals stable   Alert, oriented and in no acute distress     Eczematous plaques on hands  Stuck on papules and brown macules on trunk and ext   Red papules on trunk  Brown papules and macules with regular pigment borders and extremities      The remainder of skin exam is normal    Eyes: Conjunctivae/lids:Normal     ENT: Lips,: normal    MSK:Normal    Cardiovascular: peripheral edema none    Pulm: Breathing Normal    Neuro/Psych: Orientation:Alert and  Orientedx3 ; Mood/Affect:normal   A/P:  1. dyshidrotic eczema  Start clobetasol twice daily for 1-2 weeks then change to protopic.   Continue moisturizers.   Consider dupixent if not controlled.   2. Seborrheic keratosis, lentigo, angioma, benign nevi   It was a pleasure speaking to Luna Rivera today.  BENIGN LESIONS DISCUSSED WITH PATIENT:  I discussed the specifics of tumor, prognosis, and genetics of benign lesions.  I explained that treatment of these lesions would be purely cosmetic and not medically neccessary.  I discussed with patient different removal options including excision, cautery and /or laser.      Nature and genetics of benign skin lesions dicussed with patient.  Signs and Symptoms of skin cancer discussed with patient.  ABCDEs of melanoma reviewed with patient.  Patient encouraged to perform monthly skin exams.  UV precautions reviewed with patient.  Risks of non-melanoma skin cancer discussed with patient   Return to clinic in one year or sooner if needed.         Again, thank you for allowing me to participate in the care of your patient.        Sincerely,        Luna Roldan PA-C

## 2022-12-21 NOTE — PROGRESS NOTES
Luna Rivera is an extremely pleasant 52 year old year old female patient here today for skin check and eczema.she notes eczema has flared thee past few months she notes protopic does help but still present.  Patient has no other skin complaints today.  Remainder of the HPI, Meds, PMH, Allergies, FH, and SH was reviewed in chart.    Pertinent Hx:   No personal history of skin cancer.   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     Contact dermatitis and other eczema, due to unspecified cause      Enteritis due to [place] virus 2002     Herpes simplex without mention of complication     Last outbreak      Intramural and subserous leiomyoma of uterus 2018     Irritable bowel syndrome      PONV (postoperative nausea and vomiting)        Past Surgical History:   Procedure Laterality Date     ARTHROSCOPY KNEE WITH MEDIAL MENISCECTOMY  2014    Procedure: ARTHROSCOPY KNEE WITH MEDIAL MENISCECTOMY;  Surgeon: Alejandro Dodge MD;  Location: [facility]     COLONOSCOPY N/A 2018    Procedure: COLONOSCOPY;  Surgeon: Jose Mora MD;  Location: [facility]     ESOPHAGOSCOPY, GASTROSCOPY, DUODENOSCOPY (EGD), COMBINED N/A 2016    Procedure: COMBINED ESOPHAGOSCOPY, GASTROSCOPY, DUODENOSCOPY (EGD), BIOPSY SINGLE OR MULTIPLE;  Surgeon: Jose Mora MD;  Location: [facility]     ESOPHAGOSCOPY, GASTROSCOPY, DUODENOSCOPY (EGD), COMBINED N/A 2018    Procedure: COMBINED ESOPHAGOSCOPY, GASTROSCOPY, DUODENOSCOPY (EGD);  Surgeon: Jose Mora MD;  Location: [facility]     HC HYSTEROSCOPY, SURGICAL; W/ ENDOMETRIAL ABLATION, ANY METHOD  2010    Novasure ablation     HYSTERECTOMY TOTAL ABDOMINAL, BILATERAL SALPINGO-OOPHORECTOMY, COMBINED Bilateral 2018    TRACI only; tubes removed; ovaries left in     [facility]  DELIVERY ONLY  2002     [facility] LIGATE FALLOPIAN TUBE,POSTPARTUM          Family  History   Problem Relation Age of Onset     Breast Cancer Mother         age 46     Cancer Mother         Throat, larynx (d/t radiation from breast cancer)tongue cancer 9/2011     Hypertension Father      Cancer Father         lung CA     Neurologic Disorder Maternal Grandmother         Parkinson's     C.A.D. Maternal Grandmother      Thyroid Disease Maternal Grandmother         hyperthyroid     Arthritis Maternal Grandfather      Heart Disease Maternal Grandfather      Osteoporosis Paternal Grandmother      Heart Disease Paternal Grandfather      Cancer - colorectal No family hx of      Prostate Cancer No family hx of      Cerebrovascular Disease No family hx of      Diabetes No family hx of      Asthma No family hx of        Social History     Socioeconomic History     Marital status:      Spouse name: Not on file     Number of children: 2     Years of education: Not on file     Highest education level: Not on file   Occupational History     Occupation: Stay at Home Mom     Employer: [employer]     Occupation:  prn   Tobacco Use     Smoking status: Never     Smokeless tobacco: Never   Vaping Use     Vaping Use: Never used   Substance and Sexual Activity     Alcohol use: Yes     Comment: rarely     Drug use: No     Sexual activity: Yes     Partners: Male     Birth control/protection: Female Surgical     Comment: BTL   Other Topics Concern     Parent/sibling w/ CABG, MI or angioplasty before 65F 55M? No   Social History Narrative    Home with kids                         Social Determinants of Health     Financial Resource Strain: Not on file   Food Insecurity: Not on file   Transportation Needs: Not on file   Physical Activity: Not on file   Stress: Not on file   Social Connections: Not on file   Intimate Partner Violence: Not on file   Housing Stability: Not on file       Outpatient Encounter Medications as of 12/20/2022   Medication Sig Dispense Refill     estradiol (ESTRACE) 0.1  MG/GM vaginal cream Place 2 g vaginally twice a week 42.5 g 3     fluticasone (FLONASE) 50 MCG/ACT nasal spray Spray 1 spray into both nostrils daily 16 g 11     fluticasone (FLONASE) 50 MCG/ACT nasal spray Use 1 spray(s) in each nostril once daily 16 g 0     hydrocortisone (ANUSOL-HC) 2.5 % cream Place rectally 2 times daily 28.35 g 1     nitroFURantoin macrocrystal-monohydrate (MACROBID) 100 MG capsule Take 1 capsule with intercourse as needed for prevention of urinary tract infection 20 capsule 1     tacrolimus (PROTOPIC) 0.1 % external ointment Apply topically 2 times daily 60 g 8     tretinoin (RETIN-A) 0.025 % external gel APPLY EXTERNALLY AT BEDTIME 45 g 7     [DISCONTINUED] clobetasol (TEMOVATE) 0.05 % external cream Apply sparingly twice daily for next 1-2 weeks. 30 g 3     Omeprazole (PRILOSEC PO) Take 20 mg by mouth daily as needed        [DISCONTINUED] tacrolimus (PROTOPIC) 0.1 % external ointment Apply topically 2 times daily Office visit required for further refills: [phone] 60 g 0     No facility-administered encounter medications on file as of 12/20/2022.             O:   NAD, WDWN, Alert & Oriented, Mood & Affect wnl, Vitals stable   Here today alone   LMP 02/25/2018    General appearance normal   Vitals stable   Alert, oriented and in no acute distress     Eczematous plaques on hands  Stuck on papules and brown macules on trunk and ext   Red papules on trunk  Brown papules and macules with regular pigment borders and extremities      The remainder of skin exam is normal    Eyes: Conjunctivae/lids:Normal     ENT: Lips,: normal    MSK:Normal    Cardiovascular: peripheral edema none    Pulm: Breathing Normal    Neuro/Psych: Orientation:Alert and Orientedx3 ; Mood/Affect:normal   A/P:  1. dyshidrotic eczema  Start clobetasol twice daily for 1-2 weeks then change to protopic.   Continue moisturizers.   Consider dupixent if not controlled.   2. Seborrheic keratosis, lentigo, angioma, benign nevi    It was a pleasure speaking to Lnua Rivera today.  BENIGN LESIONS DISCUSSED WITH PATIENT:  I discussed the specifics of tumor, prognosis, and genetics of benign lesions.  I explained that treatment of these lesions would be purely cosmetic and not medically neccessary.  I discussed with patient different removal options including excision, cautery and /or laser.      Nature and genetics of benign skin lesions dicussed with patient.  Signs and Symptoms of skin cancer discussed with patient.  ABCDEs of melanoma reviewed with patient.  Patient encouraged to perform monthly skin exams.  UV precautions reviewed with patient.  Risks of non-melanoma skin cancer discussed with patient   Return to clinic in one year or sooner if needed.

## 2023-04-06 ENCOUNTER — OFFICE VISIT (OUTPATIENT)
Dept: DERMATOLOGY | Facility: CLINIC | Age: 53
End: 2023-04-06
Payer: COMMERCIAL

## 2023-04-06 DIAGNOSIS — Z41.1 ELECTIVE PROCEDURE FOR UNACCEPTABLE COSMETIC APPEARANCE: Primary | ICD-10-CM

## 2023-04-06 PROCEDURE — 96999 UNLISTED SPEC DERM SVC/PX: CPT | Performed by: PHYSICIAN ASSISTANT

## 2023-04-06 ASSESSMENT — PAIN SCALES - GENERAL: PAINLEVEL: NO PAIN (0)

## 2023-04-06 NOTE — NURSING NOTE
Luna Rivera's chief complaint for this visit includes:  Chief Complaint   Patient presents with     Botox     Patient would like to have botox for crows feet and 11. Patient had dysport in 2017.      PCP: Catrina Jin    Referring Provider:  No referring provider defined for this encounter.    LMP 02/25/2018   No Pain (0)        Allergies   Allergen Reactions     Compazine Fatigue     Neurological s/s-can't wake up     Ancef [Cefazolin Sodium] Hives         Do you need any medication refills at today's visit? Patient declined at this visit.     Ivania Bruner MA

## 2023-04-06 NOTE — LETTER
4/6/2023         RE: Luna Rivera  [address] [place] [facility] [address]        Dear Colleague,    Thank you for referring your patient, Luna Rivera, to the [facility]. Please see a copy of my visit note below.    HPI:   Chief complaints: Luna Rivera is a pleasant 52 year old female who presents for treatment of facial rhytids with cosmetic Botox. She has had Botox and dysport in the past; last treatment was in 2017. She notes that she typically needs lower than normal units. After her last injection she noticed dimpling of her inner cheeks, which is still present a little today. She is most bothered by her 11's and her crows feet.       PHYSICAL EXAM:    [facility] 02/25/2018   Skin exam performed as follows: Type 2 skin. Mood appropriate  Alert and Oriented X 3. Well developed, well nourished in no distress.  General appearance: Normal  Head including face: Normal  Eyes: conjunctiva and lids: Normal  Mouth: Lips, teeth, gums: Normal  Neck: Normal  Skin: Scalp and body hair: See below    Age appropriate facial rhytids    ASSESSMENT/PLAN:     BOTOX:  PGACAC discussed.  Risks including but not limited to injection site reaction, bruising, asymmetry, no resolution of rhytides, brow ptosis, dry mouth, tiredness, and headache.  Also label warnings are difficulty with swallowing, speaking, breathing, muscle weakness, loss of bladder control, and double vision/blurred vision.  Explained confirmed report of spread of toxin effect when used for hyperhidrosis of underarms or cosmetic use on face has not been reported.  All questions answered and entertained to patient s satisfaction.  Informed consent obtained.    --19 units injected to glabella  --10 to each crow's (9 to obicularis, 1 unit infraorbitally)   --39 units total   --Cosmetic charge of $507  --Lot: [id number]  --Exp: 05/2025          Follow-up: 3 months  CC:   Scribed By: Rosalia Pabon, MS, PA-C          Again, thank you for  allowing me to participate in the care of your patient.        Sincerely,        Rosalia Milton PA-C

## 2023-04-06 NOTE — PROGRESS NOTES
HPI:   Chief complaints: Luna Rivera is a pleasant 52 year old female who presents for treatment of facial rhytids with cosmetic Botox. She has had Botox and dysport in the past; last treatment was in 2017. She notes that she typically needs lower than normal units. After her last injection she noticed dimpling of her inner cheeks, which is still present a little today. She is most bothered by her 11's and her crows feet.       PHYSICAL EXAM:    LMP 02/25/2018   Skin exam performed as follows: Type 2 skin. Mood appropriate  Alert and Oriented X 3. Well developed, well nourished in no distress.  General appearance: Normal  Head including face: Normal  Eyes: conjunctiva and lids: Normal  Mouth: Lips, teeth, gums: Normal  Neck: Normal  Skin: Scalp and body hair: See below    Age appropriate facial rhytids    ASSESSMENT/PLAN:     BOTOX:  PGACAC discussed.  Risks including but not limited to injection site reaction, bruising, asymmetry, no resolution of rhytides, brow ptosis, dry mouth, tiredness, and headache.  Also label warnings are difficulty with swallowing, speaking, breathing, muscle weakness, loss of bladder control, and double vision/blurred vision.  Explained confirmed report of spread of toxin effect when used for hyperhidrosis of underarms or cosmetic use on face has not been reported.  All questions answered and entertained to patient s satisfaction.  Informed consent obtained.    --19 units injected to glabella  --10 to each crow's (9 to obicularis, 1 unit infraorbitally)   --39 units total   --Cosmetic charge of $507  --Lot: [id number]  --Exp: 05/2025          Follow-up: 3 months  CC:   Scribed By: Rosalia Pabon, MS, PA-C

## 2023-06-14 ENCOUNTER — OFFICE VISIT (OUTPATIENT)
Dept: DERMATOLOGY | Facility: CLINIC | Age: 53
End: 2023-06-14
Payer: COMMERCIAL

## 2023-06-14 DIAGNOSIS — D23.9 DERMAL NEVUS: ICD-10-CM

## 2023-06-14 DIAGNOSIS — L81.4 LENTIGO: Primary | ICD-10-CM

## 2023-06-14 DIAGNOSIS — D18.01 ANGIOMA OF SKIN: ICD-10-CM

## 2023-06-14 PROCEDURE — 99213 OFFICE O/P EST LOW 20 MIN: CPT | Performed by: DERMATOLOGY

## 2023-06-14 ASSESSMENT — PAIN SCALES - GENERAL: PAINLEVEL: NO PAIN (0)

## 2023-06-14 NOTE — LETTER
6/14/2023         RE: Luna Rivera  [address]  [place] [facility] [address]        Dear Colleague,    Thank you for referring your patient, Luna Rivera, to the [facility]. Please see a copy of my visit note below.    Luna Rivera is an extremely pleasant 52 year old year old female patient here today for mole on face.   .   Patient states this has been present for a while.  Patient reports the following symptoms:  Slightly larger.  .  Patient reports the following previous treatments none.  These treatments did not work.  Patient reports the following modifying factors none.  Associated symptoms: none.  Patient has no other skin complaints today.  Remainder of the HPI, Meds, PMH, Allergies, FH, and SH was reviewed in chart.      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     Contact dermatitis and other eczema, due to unspecified cause      Enteritis due to [place] virus 12/2002     Herpes simplex without mention of complication     Last outbreak 1/05     Intramural and subserous leiomyoma of uterus 03/19/2018     Irritable bowel syndrome      PONV (postoperative nausea and vomiting)        Past Surgical History:   Procedure Laterality Date     ARTHROSCOPY KNEE WITH MEDIAL MENISCECTOMY  06/20/2014    Procedure: ARTHROSCOPY KNEE WITH MEDIAL MENISCECTOMY;  Surgeon: Alejandro Dodge MD;  Location: [facility]     COLONOSCOPY N/A 12/20/2018    Procedure: COLONOSCOPY;  Surgeon: Jose Mora MD;  Location: [facility]     ESOPHAGOSCOPY, GASTROSCOPY, DUODENOSCOPY (EGD), COMBINED N/A 12/26/2016    Procedure: COMBINED ESOPHAGOSCOPY, GASTROSCOPY, DUODENOSCOPY (EGD), BIOPSY SINGLE OR MULTIPLE;  Surgeon: Jose Mora MD;  Location: [facility]     ESOPHAGOSCOPY, GASTROSCOPY, DUODENOSCOPY (EGD), COMBINED N/A 12/20/2018    Procedure: COMBINED ESOPHAGOSCOPY, GASTROSCOPY, DUODENOSCOPY (EGD);   Surgeon: Jose Mora MD;  Location: [facility] HYSTEROSCOPY, SURGICAL; W/ ENDOMETRIAL ABLATION, ANY METHOD  2010    Novasure ablation     HYSTERECTOMY TOTAL ABDOMINAL, BILATERAL SALPINGO-OOPHORECTOMY, COMBINED Bilateral 2018    TRACI only; tubes removed; ovaries left in     [facility]  DELIVERY ONLY  2002     [facility] LIGATE FALLOPIAN TUBE,POSTPARTUM          Family History   Problem Relation Age of Onset     Breast Cancer Mother         age 46     Cancer Mother         Throat, larynx (d/t radiation from breast cancer)tongue cancer 2011     Hypertension Father      Cancer Father         lung CA     Neurologic Disorder Maternal Grandmother         Parkinson's     C.A.D. Maternal Grandmother      Thyroid Disease Maternal Grandmother         hyperthyroid     Arthritis Maternal Grandfather      Heart Disease Maternal Grandfather      Osteoporosis Paternal Grandmother      Heart Disease Paternal Grandfather      Cancer - colorectal No family hx of      Prostate Cancer No family hx of      Cerebrovascular Disease No family hx of      Diabetes No family hx of      Asthma No family hx of        Social History     Socioeconomic History     Marital status:      Spouse name: Not on file     Number of children: 2     Years of education: Not on file     Highest education level: Not on file   Occupational History     Occupation: Stay at Home Mom     Employer: [employer]     Occupation:  prn   Tobacco Use     Smoking status: Never     Smokeless tobacco: Never   Vaping Use     Vaping status: Never Used   Substance and Sexual Activity     Alcohol use: Yes     Comment: rarely     Drug use: No     Sexual activity: Yes     Partners: Male     Birth control/protection: Female Surgical     Comment: BTL   Other Topics Concern     Parent/sibling w/ CABG, MI or angioplasty before 65F 55M? No   Social History Narrative    Home with kids                         Social Determinants of  Health     Financial Resource Strain: Not on file   Food Insecurity: Not on file   Transportation Needs: Not on file   Physical Activity: Not on file   Stress: Not on file   Social Connections: Not on file   Intimate Partner Violence: Not on file   Housing Stability: Not on file       Outpatient Encounter Medications as of 6/14/2023   Medication Sig Dispense Refill     clobetasol (TEMOVATE) 0.05 % external ointment Apply sparingly twice daily for next 1-2 weeks. 30 g 3     estradiol (ESTRACE) 0.1 MG/GM vaginal cream Place 2 g vaginally twice a week 42.5 g 3     fluticasone (FLONASE) 50 MCG/ACT nasal spray Spray 1 spray into both nostrils daily 16 g 11     fluticasone (FLONASE) 50 MCG/ACT nasal spray Use 1 spray(s) in each nostril once daily 16 g 0     hydrocortisone (ANUSOL-HC) 2.5 % cream Place rectally 2 times daily 28.35 g 1     nitroFURantoin macrocrystal-monohydrate (MACROBID) 100 MG capsule Take 1 capsule with intercourse as needed for prevention of urinary tract infection 20 capsule 1     Omeprazole (PRILOSEC PO) Take 20 mg by mouth daily as needed        tacrolimus (PROTOPIC) 0.1 % external ointment Apply topically 2 times daily 60 g 8     tretinoin (RETIN-A) 0.025 % external gel APPLY EXTERNALLY AT BEDTIME 45 g 7     No facility-administered encounter medications on file as of 6/14/2023.             O:   NAD, WDWN, Alert & Oriented, Mood & Affect wnl, Vitals stable   Here today alone   General appearance normal   Vitals stable   Alert, oriented and in no acute distress     R cheek flesh colored papule     brown macules on trunk and ext   Red papules on neck   Eyes: Conjunctivae/lids:Normal     ENT: Lips, buccal mucosa, tongue: normal    MSK:Normal    Cardiovascular: peripheral edema none    Pulm: Breathing Normal    Neuro/Psych: Orientation:Alert and Orientedx3 ; Mood/Affect:normal       A/P:  1. lentigo, angioma, dermal nevus  Excision discussed with patient she will think about  It was a pleasure speaking  to Luna Rivera today.  Previous clinic notes and pertinent laboratory tests were reviewed prior to Luna Rivera's visit.  Nature of benign skin lesions dicussed with patient.  Signs and Symptoms of skin cancer discussed with patient.  Patient encouraged to perform monthly skin exams.  UV precautions reviewed with patient.  Return to clinic 12 months        Again, thank you for allowing me to participate in the care of your patient.        Sincerely,        Rudy Philippe MD

## 2023-06-14 NOTE — PROGRESS NOTES
Luna Rivera is an extremely pleasant 52 year old year old female patient here today for mole on face.   .   Patient states this has been present for a while.  Patient reports the following symptoms:  Slightly larger.  .  Patient reports the following previous treatments none.  These treatments did not work.  Patient reports the following modifying factors none.  Associated symptoms: none.  Patient has no other skin complaints today.  Remainder of the HPI, Meds, PMH, Allergies, FH, and SH was reviewed in chart.      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     Contact dermatitis and other eczema, due to unspecified cause      Enteritis due to [place] virus 12/2002     Herpes simplex without mention of complication     Last outbreak 1/05     Intramural and subserous leiomyoma of uterus 03/19/2018     Irritable bowel syndrome      PONV (postoperative nausea and vomiting)        Past Surgical History:   Procedure Laterality Date     ARTHROSCOPY KNEE WITH MEDIAL MENISCECTOMY  06/20/2014    Procedure: ARTHROSCOPY KNEE WITH MEDIAL MENISCECTOMY;  Surgeon: Alejandro Dodge MD;  Location: [facility]     COLONOSCOPY N/A 12/20/2018    Procedure: COLONOSCOPY;  Surgeon: Jose Mora MD;  Location: [facility]     ESOPHAGOSCOPY, GASTROSCOPY, DUODENOSCOPY (EGD), COMBINED N/A 12/26/2016    Procedure: COMBINED ESOPHAGOSCOPY, GASTROSCOPY, DUODENOSCOPY (EGD), BIOPSY SINGLE OR MULTIPLE;  Surgeon: Jose Mora MD;  Location: [facility]     ESOPHAGOSCOPY, GASTROSCOPY, DUODENOSCOPY (EGD), COMBINED N/A 12/20/2018    Procedure: COMBINED ESOPHAGOSCOPY, GASTROSCOPY, DUODENOSCOPY (EGD);  Surgeon: Jose Mora MD;  Location: [facility]     HC HYSTEROSCOPY, SURGICAL; W/ ENDOMETRIAL ABLATION, ANY METHOD  03/30/2010    Novasure ablation     HYSTERECTOMY TOTAL ABDOMINAL, BILATERAL SALPINGO-OOPHORECTOMY, COMBINED Bilateral 03/27/2018    TRACI only; tubes  removed; ovaries left in     [facility]  DELIVERY ONLY  ,      [facility] LIGATE FALLOPIAN TUBE,POSTPARTUM          Family History   Problem Relation Age of Onset     Breast Cancer Mother         age 46     Cancer Mother         Throat, larynx (d/t radiation from breast cancer)tongue cancer 2011     Hypertension Father      Cancer Father         lung CA     Neurologic Disorder Maternal Grandmother         Parkinson's     C.A.D. Maternal Grandmother      Thyroid Disease Maternal Grandmother         hyperthyroid     Arthritis Maternal Grandfather      Heart Disease Maternal Grandfather      Osteoporosis Paternal Grandmother      Heart Disease Paternal Grandfather      Cancer - colorectal No family hx of      Prostate Cancer No family hx of      Cerebrovascular Disease No family hx of      Diabetes No family hx of      Asthma No family hx of        Social History     Socioeconomic History     Marital status:      Spouse name: Not on file     Number of children: 2     Years of education: Not on file     Highest education level: Not on file   Occupational History     Occupation: Stay at Home Mom     Employer: [employer]     Occupation:  prn   Tobacco Use     Smoking status: Never     Smokeless tobacco: Never   Vaping Use     Vaping status: Never Used   Substance and Sexual Activity     Alcohol use: Yes     Comment: rarely     Drug use: No     Sexual activity: Yes     Partners: Male     Birth control/protection: Female Surgical     Comment: BTL   Other Topics Concern     Parent/sibling w/ CABG, MI or angioplasty before 65F 55M? No   Social History Narrative    Home with kids                         Social Determinants of Health     Financial Resource Strain: Not on file   Food Insecurity: Not on file   Transportation Needs: Not on file   Physical Activity: Not on file   Stress: Not on file   Social Connections: Not on file   Intimate Partner Violence: Not on file   Housing  Stability: Not on file       Outpatient Encounter Medications as of 6/14/2023   Medication Sig Dispense Refill     clobetasol (TEMOVATE) 0.05 % external ointment Apply sparingly twice daily for next 1-2 weeks. 30 g 3     estradiol (ESTRACE) 0.1 MG/GM vaginal cream Place 2 g vaginally twice a week 42.5 g 3     fluticasone (FLONASE) 50 MCG/ACT nasal spray Spray 1 spray into both nostrils daily 16 g 11     fluticasone (FLONASE) 50 MCG/ACT nasal spray Use 1 spray(s) in each nostril once daily 16 g 0     hydrocortisone (ANUSOL-HC) 2.5 % cream Place rectally 2 times daily 28.35 g 1     nitroFURantoin macrocrystal-monohydrate (MACROBID) 100 MG capsule Take 1 capsule with intercourse as needed for prevention of urinary tract infection 20 capsule 1     Omeprazole (PRILOSEC PO) Take 20 mg by mouth daily as needed        tacrolimus (PROTOPIC) 0.1 % external ointment Apply topically 2 times daily 60 g 8     tretinoin (RETIN-A) 0.025 % external gel APPLY EXTERNALLY AT BEDTIME 45 g 7     No facility-administered encounter medications on file as of 6/14/2023.             O:   NAD, WDWN, Alert & Oriented, Mood & Affect wnl, Vitals stable   Here today alone   General appearance normal   Vitals stable   Alert, oriented and in no acute distress     R cheek flesh colored papule     brown macules on trunk and ext   Red papules on neck   Eyes: Conjunctivae/lids:Normal     ENT: Lips, buccal mucosa, tongue: normal    MSK:Normal    Cardiovascular: peripheral edema none    Pulm: Breathing Normal    Neuro/Psych: Orientation:Alert and Orientedx3 ; Mood/Affect:normal       A/P:  1. lentigo, angioma, dermal nevus  Excision discussed with patient she will think about  It was a pleasure speaking to Luna Rivera today.  Previous clinic notes and pertinent laboratory tests were reviewed prior to Luna Rivera's visit.  Nature of benign skin lesions dicussed with patient.  Signs and Symptoms of skin cancer discussed with patient.  Patient  encouraged to perform monthly skin exams.  UV precautions reviewed with patient.  Return to clinic 12 months

## 2023-08-24 ENCOUNTER — OFFICE VISIT (OUTPATIENT)
Dept: FAMILY MEDICINE | Facility: CLINIC | Age: 53
End: 2023-08-24
Payer: COMMERCIAL

## 2023-08-24 VITALS
BODY MASS INDEX: 21.44 KG/M2 | HEART RATE: 92 BPM | HEIGHT: 68 IN | OXYGEN SATURATION: 98 % | DIASTOLIC BLOOD PRESSURE: 78 MMHG | SYSTOLIC BLOOD PRESSURE: 106 MMHG | TEMPERATURE: 98.6 F | RESPIRATION RATE: 16 BRPM

## 2023-08-24 DIAGNOSIS — N76.0 BV (BACTERIAL VAGINOSIS): Primary | ICD-10-CM

## 2023-08-24 DIAGNOSIS — B96.89 BV (BACTERIAL VAGINOSIS): Primary | ICD-10-CM

## 2023-08-24 DIAGNOSIS — B37.31 YEAST INFECTION OF THE VAGINA: ICD-10-CM

## 2023-08-24 LAB
ALBUMIN UR-MCNC: NEGATIVE MG/DL
APPEARANCE UR: CLEAR
BILIRUB UR QL STRIP: NEGATIVE
CLUE CELLS: PRESENT
COLOR UR AUTO: YELLOW
GLUCOSE UR STRIP-MCNC: NEGATIVE MG/DL
HGB UR QL STRIP: ABNORMAL
KETONES UR STRIP-MCNC: NEGATIVE MG/DL
LEUKOCYTE ESTERASE UR QL STRIP: NEGATIVE
NITRATE UR QL: NEGATIVE
PH UR STRIP: 7 [PH] (ref 5–7)
RBC #/AREA URNS AUTO: NORMAL /HPF
SP GR UR STRIP: <=1.005 (ref 1–1.03)
TRICHOMONAS, WET PREP: ABNORMAL
UROBILINOGEN UR STRIP-ACNC: 0.2 E.U./DL
WBC #/AREA URNS AUTO: NORMAL /HPF
WBC'S/HIGH POWER FIELD, WET PREP: ABNORMAL
YEAST, WET PREP: PRESENT

## 2023-08-24 PROCEDURE — 87210 SMEAR WET MOUNT SALINE/INK: CPT | Performed by: NURSE PRACTITIONER

## 2023-08-24 PROCEDURE — 81001 URINALYSIS AUTO W/SCOPE: CPT | Performed by: NURSE PRACTITIONER

## 2023-08-24 PROCEDURE — 99213 OFFICE O/P EST LOW 20 MIN: CPT | Performed by: NURSE PRACTITIONER

## 2023-08-24 RX ORDER — FLUCONAZOLE 150 MG/1
150 TABLET ORAL ONCE
Qty: 1 TABLET | Refills: 0 | Status: SHIPPED | OUTPATIENT
Start: 2023-08-24 | End: 2023-08-24

## 2023-08-24 RX ORDER — FLUCONAZOLE 150 MG/1
150 TABLET ORAL
Qty: 3 TABLET | Refills: 0 | Status: SHIPPED | OUTPATIENT
Start: 2023-08-24 | End: 2023-08-31

## 2023-08-24 RX ORDER — METRONIDAZOLE 500 MG/1
500 TABLET ORAL 2 TIMES DAILY
Qty: 14 TABLET | Refills: 0 | Status: SHIPPED | OUTPATIENT
Start: 2023-08-24 | End: 2023-08-31

## 2023-08-24 ASSESSMENT — PAIN SCALES - GENERAL: PAINLEVEL: NO PAIN (0)

## 2023-08-24 NOTE — PATIENT INSTRUCTIONS
You have bacterial vaginosis and a yeast infection    See below for more information regarding bacterial vaginosis.    Take metronidazole as prescribed x 7 days. Take one dose of diflucan for the yeast. Do not drink any alcohol while taking this medication or for 7 days after completing it as it can make you very ill. Take a probiotic while on the antibiotic.    Follow up with primary care provider if no improvement or worsening in 1 week.      Bacterial Vaginosis  Bacterial vaginosis is a vaginal infection that occurs when the normal balance of bacteria in the vagina is disrupted. It results from an overgrowth of certain bacteria. This is the most common vaginal infection in women of childbearing age. Treatment is important to prevent complications, especially in pregnant women, as it can cause a premature delivery.  CAUSES   Bacterial vaginosis is caused by an increase in harmful bacteria that are normally present in smaller amounts in the vagina. Several different kinds of bacteria can cause bacterial vaginosis. However, the reason that the condition develops is not fully understood.  RISK FACTORS  Certain activities or behaviors can put you at an increased risk of developing bacterial vaginosis, including:  Having a new sex partner or multiple sex partners.  Douching.  Using an intrauterine device (IUD) for contraception.  Women do not get bacterial vaginosis from toilet seats, bedding, swimming pools, or contact with objects around them.  SIGNS AND SYMPTOMS   Some women with bacterial vaginosis have no signs or symptoms. Common symptoms include:  Grey vaginal discharge.  A fishlike odor with discharge, especially after sexual intercourse.  Itching or burning of the vagina and vulva.  Burning or pain with urination.  DIAGNOSIS   Your health care provider will take a medical history and examine the vagina for signs of bacterial vaginosis. A sample of vaginal fluid may be taken. Your health care provider will look  at this sample under a microscope to check for bacteria and abnormal cells. A vaginal pH test may also be done.   TREATMENT   Bacterial vaginosis may be treated with antibiotic medicines. These may be given in the form of a pill or a vaginal cream. A second round of antibiotics may be prescribed if the condition comes back after treatment. Because bacterial vaginosis increases your risk for sexually transmitted diseases, getting treated can help reduce your risk for chlamydia, gonorrhea, HIV, and herpes.  HOME CARE INSTRUCTIONS   Only take over-the-counter or prescription medicines as directed by your health care provider.  If antibiotic medicine was prescribed, take it as directed. Make sure you finish it even if you start to feel better.  Tell all sexual partners that you have a vaginal infection. They should see their health care provider and be treated if they have problems, such as a mild rash or itching.  During treatment, it is important that you follow these instructions:  Avoid sexual activity or use condoms correctly.  Do not douche.  Avoid alcohol as directed by your health care provider.  Avoid breastfeeding as directed by your health care provider.  SEEK MEDICAL CARE IF:   Your symptoms are not improving after 3 days of treatment.  You have increased discharge or pain.  You have a fever.  MAKE SURE YOU:   Understand these instructions.  Will watch your condition.  Will get help right away if you are not doing well or get worse.  FOR MORE INFORMATION   Centers for Disease Control and Prevention, Division of STD Prevention: www.cdc.gov/std  American Sexual Health Association (ANN-MARIE): www.ashastd.org      This information is not intended to replace advice given to you by your health care provider. Make sure you discuss any questions you have with your health care provider.     Document Released: 12/18/2006 Document Revised: 10/06/2015 Document Reviewed: 07/30/2014  ExitCare  Patient Information  2015  [facility].

## 2023-08-24 NOTE — PROGRESS NOTES
Assessment & Plan     BV (bacterial vaginosis)  Wet prep c/w BV. Start metronidazole. RTC if not improving.   - Wet prep - Clinic Collect  - UA Macroscopic with reflex to Microscopic and Culture - Clinic Collect  - UA Microscopic with Reflex to Culture  - metroNIDAZOLE (FLAGYL) 500 MG tablet; Take 1 tablet (500 mg) by mouth 2 times daily for 7 days    Yeast infection of the vagina  Yeast present on wet prep as well. Notes she typically needs more than one dose of fluconazole.   - fluconazole (DIFLUCAN) 150 MG tablet; Take 1 tablet (150 mg) by mouth every 3 days for 3 doses          Patient Instructions   You have bacterial vaginosis and a yeast infection    See below for more information regarding bacterial vaginosis.    Take metronidazole as prescribed x 7 days. Take one dose of diflucan for the yeast. Do not drink any alcohol while taking this medication or for 7 days after completing it as it can make you very ill. Take a probiotic while on the antibiotic.    Follow up with primary care provider if no improvement or worsening in 1 week.      Bacterial Vaginosis  Bacterial vaginosis is a vaginal infection that occurs when the normal balance of bacteria in the vagina is disrupted. It results from an overgrowth of certain bacteria. This is the most common vaginal infection in women of childbearing age. Treatment is important to prevent complications, especially in pregnant women, as it can cause a premature delivery.  CAUSES   Bacterial vaginosis is caused by an increase in harmful bacteria that are normally present in smaller amounts in the vagina. Several different kinds of bacteria can cause bacterial vaginosis. However, the reason that the condition develops is not fully understood.  RISK FACTORS  Certain activities or behaviors can put you at an increased risk of developing bacterial vaginosis, including:  Having a new sex partner or multiple sex partners.  Douching.  Using an intrauterine device (IUD) for  contraception.  Women do not get bacterial vaginosis from toilet seats, bedding, swimming pools, or contact with objects around them.  SIGNS AND SYMPTOMS   Some women with bacterial vaginosis have no signs or symptoms. Common symptoms include:  Grey vaginal discharge.  A fishlike odor with discharge, especially after sexual intercourse.  Itching or burning of the vagina and vulva.  Burning or pain with urination.  DIAGNOSIS   Your health care provider will take a medical history and examine the vagina for signs of bacterial vaginosis. A sample of vaginal fluid may be taken. Your health care provider will look at this sample under a microscope to check for bacteria and abnormal cells. A vaginal pH test may also be done.   TREATMENT   Bacterial vaginosis may be treated with antibiotic medicines. These may be given in the form of a pill or a vaginal cream. A second round of antibiotics may be prescribed if the condition comes back after treatment. Because bacterial vaginosis increases your risk for sexually transmitted diseases, getting treated can help reduce your risk for chlamydia, gonorrhea, HIV, and herpes.  HOME CARE INSTRUCTIONS   Only take over-the-counter or prescription medicines as directed by your health care provider.  If antibiotic medicine was prescribed, take it as directed. Make sure you finish it even if you start to feel better.  Tell all sexual partners that you have a vaginal infection. They should see their health care provider and be treated if they have problems, such as a mild rash or itching.  During treatment, it is important that you follow these instructions:  Avoid sexual activity or use condoms correctly.  Do not douche.  Avoid alcohol as directed by your health care provider.  Avoid breastfeeding as directed by your health care provider.  SEEK MEDICAL CARE IF:   Your symptoms are not improving after 3 days of treatment.  You have increased discharge or pain.  You have a fever.  MAKE SURE  "YOU:   Understand these instructions.  Will watch your condition.  Will get help right away if you are not doing well or get worse.  FOR MORE INFORMATION   Centers for Disease Control and Prevention, Division of STD Prevention: www.cdc.gov/std  American Sexual Health Association (ANN-MARIE): www.ashastd.org      This information is not intended to replace advice given to you by your health care provider. Make sure you discuss any questions you have with your health care provider.     Document Released: 12/18/2006 Document Revised: 10/06/2015 Document Reviewed: 07/30/2014  ExitCare  Patient Information  2015 [employer].        MARLON Tucker CNP  [facility]    Julius Carrasco is a 52 year old, presenting for the following health issues:  UTI        8/24/2023     1:12 PM   Additional Questions   Roomed by jacklyn vargas       History of Present Illness       Reason for visit:  Female issues  Symptom onset:  1-3 days ago  Symptoms include:  Irritation pain  Symptom intensity:  Moderate  Symptom progression:  Worsening  Had these symptoms before:  No  What makes it worse:  Touch  What makes it better:  No    She eats 2-3 servings of fruits and vegetables daily.She consumes 0 sweetened beverage(s) daily.She exercises with enough effort to increase her heart rate 20 to 29 minutes per day.  She exercises with enough effort to increase her heart rate 4 days per week.   She is taking medications regularly.     Vaginal soreness, swelling and discomfort, urinary frequency       Review of Systems   Constitutional, HEENT, cardiovascular, pulmonary, gi and gu systems are negative, except as otherwise noted.      Objective    /78   Pulse 92   Temp 98.6  F (37  C) (Tympanic)   Resp 16   Ht 1.727 m (5' 8\")   LMP 02/25/2018   SpO2 98%   BMI 21.44 kg/m    Body mass index is 21.44 kg/m .  Physical Exam  Vitals and nursing note reviewed.   Constitutional:       General: She is not in acute " distress.     Appearance: Normal appearance.   HENT:      Head: Normocephalic and atraumatic.      Mouth/Throat:      Mouth: Mucous membranes are moist.   Cardiovascular:      Rate and Rhythm: Normal rate.   Pulmonary:      Effort: Pulmonary effort is normal.   Genitourinary:     Comments: Mild swelling noted of labia minora  Musculoskeletal:      Cervical back: Neck supple.   Skin:     General: Skin is warm and dry.   Neurological:      General: No focal deficit present.      Mental Status: She is alert.   Psychiatric:         Mood and Affect: Mood normal.         Behavior: Behavior normal.            Results for orders placed or performed in visit on 08/24/23 (from the past 24 hour(s))   Wet prep - Clinic Collect    Specimen: Vagina; Swab   Result Value Ref Range    Trichomonas Absent Absent    Yeast Present (A) Absent    Clue Cells Present (A) Absent    WBCs/high power field 4+ (A) None   UA Macroscopic with reflex to Microscopic and Culture - Clinic Collect    Specimen: Urine, Midstream   Result Value Ref Range    Color Urine Yellow Colorless, Straw, Light Yellow, Yellow    Appearance Urine Clear Clear    Glucose Urine Negative Negative mg/dL    Bilirubin Urine Negative Negative    Ketones Urine Negative Negative mg/dL    Specific Gravity Urine <=1.005 1.003 - 1.035    Blood Urine Trace (A) Negative    pH Urine 7.0 5.0 - 7.0    Protein Albumin Urine Negative Negative mg/dL    Urobilinogen Urine 0.2 0.2, 1.0 E.U./dL    Nitrite Urine Negative Negative    Leukocyte Esterase Urine Negative Negative   UA Microscopic with Reflex to Culture   Result Value Ref Range    RBC Urine None Seen 0-2 /HPF /HPF    WBC Urine None Seen 0-5 /HPF /HPF    Narrative    Urine Culture not indicated

## 2023-10-19 ENCOUNTER — OFFICE VISIT (OUTPATIENT)
Dept: DERMATOLOGY | Facility: CLINIC | Age: 53
End: 2023-10-19
Payer: COMMERCIAL

## 2023-10-19 DIAGNOSIS — L82.1 SEBORRHEIC KERATOSIS: ICD-10-CM

## 2023-10-19 DIAGNOSIS — L57.0 ACTINIC KERATOSIS: ICD-10-CM

## 2023-10-19 DIAGNOSIS — D18.01 CHERRY ANGIOMA: ICD-10-CM

## 2023-10-19 DIAGNOSIS — L30.1 DYSHIDROTIC ECZEMA: ICD-10-CM

## 2023-10-19 DIAGNOSIS — L81.4 LENTIGO: Primary | ICD-10-CM

## 2023-10-19 DIAGNOSIS — D22.9 MULTIPLE BENIGN NEVI: ICD-10-CM

## 2023-10-19 PROCEDURE — 17000 DESTRUCT PREMALG LESION: CPT | Performed by: PHYSICIAN ASSISTANT

## 2023-10-19 PROCEDURE — 99213 OFFICE O/P EST LOW 20 MIN: CPT | Mod: 25 | Performed by: PHYSICIAN ASSISTANT

## 2023-10-19 ASSESSMENT — PAIN SCALES - GENERAL: PAINLEVEL: NO PAIN (0)

## 2023-10-19 NOTE — LETTER
10/19/2023         RE: Luna Rivera  [address]  [place] [address]        Dear Colleague,    Thank you for referring your patient, Luna Rivera, to the [facility]. Please see a copy of my visit note below.    Luna Rivera is an extremely pleasant 52 year old year old female patient here today for skin check and eczema. She notes that eczema flares with eggs. She has noticed a rough are on nose, no painful or bleeding skin lesions.   Patient has no other skin complaints today.  Remainder of the HPI, Meds, PMH, Allergies, FH, and SH was reviewed in chart.    Pertinent Hx:   No personal history of skin cancer.   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     Contact dermatitis and other eczema, due to unspecified cause      Enteritis due to [place] virus 12/2002     Herpes simplex without mention of complication     Last outbreak 1/05     Intramural and subserous leiomyoma of uterus 03/19/2018     Irritable bowel syndrome      PONV (postoperative nausea and vomiting)        Past Surgical History:   Procedure Laterality Date     ARTHROSCOPY KNEE WITH MEDIAL MENISCECTOMY  06/20/2014    Procedure: ARTHROSCOPY KNEE WITH MEDIAL MENISCECTOMY;  Surgeon: Alejandro Dodge MD;  Location: [facility]     COLONOSCOPY N/A 12/20/2018    Procedure: COLONOSCOPY;  Surgeon: Jose Mora MD;  Location: [facility]     ESOPHAGOSCOPY, GASTROSCOPY, DUODENOSCOPY (EGD), COMBINED N/A 12/26/2016    Procedure: COMBINED ESOPHAGOSCOPY, GASTROSCOPY, DUODENOSCOPY (EGD), BIOPSY SINGLE OR MULTIPLE;  Surgeon: Jose Mora MD;  Location: [facility]     ESOPHAGOSCOPY, GASTROSCOPY, DUODENOSCOPY (EGD), COMBINED N/A 12/20/2018    Procedure: COMBINED ESOPHAGOSCOPY, GASTROSCOPY, DUODENOSCOPY (EGD);  Surgeon: Jose Mora MD;  Location: [facility]     HC HYSTEROSCOPY, SURGICAL; W/ ENDOMETRIAL ABLATION, ANY METHOD   2010    Novasure ablation     HYSTERECTOMY TOTAL ABDOMINAL, BILATERAL SALPINGO-OOPHORECTOMY, COMBINED Bilateral 2018    TRACI only; tubes removed; ovaries left in     C  DELIVERY ONLY  2002     [facility] LIGATE FALLOPIAN TUBE,POSTPARTUM          Family History   Problem Relation Age of Onset     Breast Cancer Mother         age 46     Cancer Mother         Throat, larynx (d/t radiation from breast cancer)tongue cancer 2011     Hypertension Father      Cancer Father         lung CA     Neurologic Disorder Maternal Grandmother         Parkinson's     C.A.D. Maternal Grandmother      Thyroid Disease Maternal Grandmother         hyperthyroid     Arthritis Maternal Grandfather      Heart Disease Maternal Grandfather      Osteoporosis Paternal Grandmother      Heart Disease Paternal Grandfather      Cancer - colorectal No family hx of      Prostate Cancer No family hx of      Cerebrovascular Disease No family hx of      Diabetes No family hx of      Asthma No family hx of        Social History     Socioeconomic History     Marital status:      Spouse name: Not on file     Number of children: 2     Years of education: Not on file     Highest education level: Not on file   Occupational History     Occupation: Stay at Home Mom     Employer: [email or web address]     Occupation:  prn   Tobacco Use     Smoking status: Never     Smokeless tobacco: Never   Vaping Use     Vaping Use: Never used   Substance and Sexual Activity     Alcohol use: Yes     Comment: rarely     Drug use: No     Sexual activity: Yes     Partners: Male     Birth control/protection: Female Surgical     Comment: BTL   Other Topics Concern     Parent/sibling w/ CABG, MI or angioplasty before 65F 55M? No   Social History Narrative    Home with kids                         Social Determinants of Health     Financial Resource Strain: Not on file   Food Insecurity: Not on file   Transportation Needs: Not on file    Physical Activity: Not on file   Stress: Not on file   Social Connections: Not on file   Interpersonal Safety: Not on file   Housing Stability: Not on file       Outpatient Encounter Medications as of 10/19/2023   Medication Sig Dispense Refill     fluticasone (FLONASE) 50 MCG/ACT nasal spray Use 1 spray(s) in each nostril once daily 16 g 0     hydrocortisone (ANUSOL-HC) 2.5 % cream Place rectally 2 times daily 28.35 g 1     tacrolimus (PROTOPIC) 0.1 % external ointment Apply topically 2 times daily 60 g 8     tretinoin (RETIN-A) 0.025 % external gel APPLY EXTERNALLY AT BEDTIME 45 g 7     clobetasol (TEMOVATE) 0.05 % external ointment Apply sparingly twice daily for next 1-2 weeks. (Patient not taking: Reported on 8/24/2023) 30 g 3     estradiol (ESTRACE) 0.1 MG/GM vaginal cream Place 2 g vaginally twice a week (Patient not taking: Reported on 8/24/2023) 42.5 g 3     fluticasone (FLONASE) 50 MCG/ACT nasal spray Spray 1 spray into both nostrils daily (Patient not taking: Reported on 10/19/2023) 16 g 11     nitroFURantoin macrocrystal-monohydrate (MACROBID) 100 MG capsule Take 1 capsule with intercourse as needed for prevention of urinary tract infection (Patient not taking: Reported on 8/24/2023) 20 capsule 1     No facility-administered encounter medications on file as of 10/19/2023.             O:   NAD, WDWN, Alert & Oriented, Mood & Affect wnl, Vitals stable   Here today alone   LMP 02/25/2018    General appearance normal   Vitals stable   Alert, oriented and in no acute distress     Eczematous plaques on hands  Gritty papule on bridge of nose   Firm papule with positive dimple sign on left medial thigh and right shin   Stuck on papules and brown macules on trunk and ext   Red papules on trunk  Brown papules and macules with regular pigment borders and extremities      The remainder of skin exam is normal    Eyes: Conjunctivae/lids:Normal     ENT: Lips,: normal    MSK:Normal    Cardiovascular: peripheral edema  none    Pulm: Breathing Normal    Neuro/Psych: Orientation:Alert and Orientedx3 ; Mood/Affect:normal   A/P:  1. dyshidrotic eczema  Continue to alternate clobetasol twice daily for 1-2 weeks then change to protopic.   Use moisturizers consistently.  2. Actinic keratosis on nasal bridge x 1  LN2:  Treated with LN2 for 5s for 1-2 cycles. Warned risks of blistering, pain, pigment change, scarring, and incomplete resolution.  Advised patient to return if lesions do not completely resolve.  Wound care sheet given.  3. Seborrheic keratosis, lentigo, angioma, benign nevi, dermatofibroma   It was a pleasure speaking to Luna Rivera today.  BENIGN LESIONS DISCUSSED WITH PATIENT:  I discussed the specifics of tumor, prognosis, and genetics of benign lesions.  I explained that treatment of these lesions would be purely cosmetic and not medically neccessary.  I discussed with patient different removal options including excision, cautery and /or laser.      Nature and genetics of benign skin lesions dicussed with patient.  Signs and Symptoms of skin cancer discussed with patient.  ABCDEs of melanoma reviewed with patient.  Patient encouraged to perform monthly skin exams.  UV precautions reviewed with patient.  Risks of non-melanoma skin cancer discussed with patient   Return to clinic in one year or sooner if needed.       Again, thank you for allowing me to participate in the care of your patient.        Sincerely,        Luna Roldan PA-C

## 2023-10-20 NOTE — PROGRESS NOTES
Luna Rivera is an extremely pleasant 52 year old year old female patient here today for skin check and eczema. She notes that eczema flares with eggs. She has noticed a rough are on nose, no painful or bleeding skin lesions.   Patient has no other skin complaints today.  Remainder of the HPI, Meds, PMH, Allergies, FH, and SH was reviewed in chart.    Pertinent Hx:   No personal history of skin cancer.   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    Contact dermatitis and other eczema, due to unspecified cause     Enteritis due to [place] virus 2002    Herpes simplex without mention of complication     Last outbreak     Intramural and subserous leiomyoma of uterus 2018    Irritable bowel syndrome     PONV (postoperative nausea and vomiting)        Past Surgical History:   Procedure Laterality Date    ARTHROSCOPY KNEE WITH MEDIAL MENISCECTOMY  2014    Procedure: ARTHROSCOPY KNEE WITH MEDIAL MENISCECTOMY;  Surgeon: Alejandro Dodge MD;  Location: [facility]    COLONOSCOPY N/A 2018    Procedure: COLONOSCOPY;  Surgeon: Jose Mora MD;  Location: [facility]    ESOPHAGOSCOPY, GASTROSCOPY, DUODENOSCOPY (EGD), COMBINED N/A 2016    Procedure: COMBINED ESOPHAGOSCOPY, GASTROSCOPY, DUODENOSCOPY (EGD), BIOPSY SINGLE OR MULTIPLE;  Surgeon: Jose Mora MD;  Location: [facility]    ESOPHAGOSCOPY, GASTROSCOPY, DUODENOSCOPY (EGD), COMBINED N/A 2018    Procedure: COMBINED ESOPHAGOSCOPY, GASTROSCOPY, DUODENOSCOPY (EGD);  Surgeon: Jose Mora MD;  Location: [facility]    HC HYSTEROSCOPY, SURGICAL; W/ ENDOMETRIAL ABLATION, ANY METHOD  2010    Novasure ablation    HYSTERECTOMY TOTAL ABDOMINAL, BILATERAL SALPINGO-OOPHORECTOMY, COMBINED Bilateral 2018    TRACI only; tubes removed; ovaries left in    [facility]  DELIVERY ONLY  2002    [facility] LIGATE FALLOPIAN TUBE,POSTPARTUM          Family  History   Problem Relation Age of Onset    Breast Cancer Mother         age 46    Cancer Mother         Throat, larynx (d/t radiation from breast cancer)tongue cancer 9/2011    Hypertension Father     Cancer Father         lung CA    Neurologic Disorder Maternal Grandmother         Parkinson's    C.A.D. Maternal Grandmother     Thyroid Disease Maternal Grandmother         hyperthyroid    Arthritis Maternal Grandfather     Heart Disease Maternal Grandfather     Osteoporosis Paternal Grandmother     Heart Disease Paternal Grandfather     Cancer - colorectal No family hx of     Prostate Cancer No family hx of     Cerebrovascular Disease No family hx of     Diabetes No family hx of     Asthma No family hx of        Social History     Socioeconomic History    Marital status:      Spouse name: Not on file    Number of children: 2    Years of education: Not on file    Highest education level: Not on file   Occupational History    Occupation: Stay at Home Mom     Employer: [employer]    Occupation:  prn   Tobacco Use    Smoking status: Never    Smokeless tobacco: Never   Vaping Use    Vaping Use: Never used   Substance and Sexual Activity    Alcohol use: Yes     Comment: rarely    Drug use: No    Sexual activity: Yes     Partners: Male     Birth control/protection: Female Surgical     Comment: BTL   Other Topics Concern    Parent/sibling w/ CABG, MI or angioplasty before 65F 55M? No   Social History Narrative    Home with kids                         Social Determinants of Health     Financial Resource Strain: Not on file   Food Insecurity: Not on file   Transportation Needs: Not on file   Physical Activity: Not on file   Stress: Not on file   Social Connections: Not on file   Interpersonal Safety: Not on file   Housing Stability: Not on file       Outpatient Encounter Medications as of 10/19/2023   Medication Sig Dispense Refill    fluticasone (FLONASE) 50 MCG/ACT nasal spray Use 1 spray(s)  in each nostril once daily 16 g 0    hydrocortisone (ANUSOL-HC) 2.5 % cream Place rectally 2 times daily 28.35 g 1    tacrolimus (PROTOPIC) 0.1 % external ointment Apply topically 2 times daily 60 g 8    tretinoin (RETIN-A) 0.025 % external gel APPLY EXTERNALLY AT BEDTIME 45 g 7    clobetasol (TEMOVATE) 0.05 % external ointment Apply sparingly twice daily for next 1-2 weeks. (Patient not taking: Reported on 8/24/2023) 30 g 3    estradiol (ESTRACE) 0.1 MG/GM vaginal cream Place 2 g vaginally twice a week (Patient not taking: Reported on 8/24/2023) 42.5 g 3    fluticasone (FLONASE) 50 MCG/ACT nasal spray Spray 1 spray into both nostrils daily (Patient not taking: Reported on 10/19/2023) 16 g 11    nitroFURantoin macrocrystal-monohydrate (MACROBID) 100 MG capsule Take 1 capsule with intercourse as needed for prevention of urinary tract infection (Patient not taking: Reported on 8/24/2023) 20 capsule 1     No facility-administered encounter medications on file as of 10/19/2023.             O:   NAD, WDWN, Alert & Oriented, Mood & Affect wnl, Vitals stable   Here today alone   LMP 02/25/2018    General appearance normal   Vitals stable   Alert, oriented and in no acute distress     Eczematous plaques on hands  Gritty papule on bridge of nose   Firm papule with positive dimple sign on left medial thigh and right shin   Stuck on papules and brown macules on trunk and ext   Red papules on trunk  Brown papules and macules with regular pigment borders and extremities      The remainder of skin exam is normal    Eyes: Conjunctivae/lids:Normal     ENT: Lips,: normal    MSK:Normal    Cardiovascular: peripheral edema none    Pulm: Breathing Normal    Neuro/Psych: Orientation:Alert and Orientedx3 ; Mood/Affect:normal   A/P:  1. dyshidrotic eczema  Continue to alternate clobetasol twice daily for 1-2 weeks then change to protopic.   Use moisturizers consistently.  2. Actinic keratosis on nasal bridge x 1  LN2:  Treated with LN2 for  5s for 1-2 cycles. Warned risks of blistering, pain, pigment change, scarring, and incomplete resolution.  Advised patient to return if lesions do not completely resolve.  Wound care sheet given.  3. Seborrheic keratosis, lentigo, angioma, benign nevi, dermatofibroma   It was a pleasure speaking to Luna Rivera today.  BENIGN LESIONS DISCUSSED WITH PATIENT:  I discussed the specifics of tumor, prognosis, and genetics of benign lesions.  I explained that treatment of these lesions would be purely cosmetic and not medically neccessary.  I discussed with patient different removal options including excision, cautery and /or laser.      Nature and genetics of benign skin lesions dicussed with patient.  Signs and Symptoms of skin cancer discussed with patient.  ABCDEs of melanoma reviewed with patient.  Patient encouraged to perform monthly skin exams.  UV precautions reviewed with patient.  Risks of non-melanoma skin cancer discussed with patient   Return to clinic in one year or sooner if needed.

## 2023-11-05 ENCOUNTER — HEALTH MAINTENANCE LETTER (OUTPATIENT)
Age: 53
End: 2023-11-05

## 2023-11-26 DIAGNOSIS — J30.2 SEASONAL ALLERGIC RHINITIS, UNSPECIFIED TRIGGER: ICD-10-CM

## 2023-11-27 RX ORDER — FLUTICASONE PROPIONATE 50 MCG
SPRAY, SUSPENSION (ML) NASAL
Qty: 48 G | Refills: 1 | Status: SHIPPED | OUTPATIENT
Start: 2023-11-27

## 2024-01-14 ENCOUNTER — HEALTH MAINTENANCE LETTER (OUTPATIENT)
Age: 54
End: 2024-01-14

## 2024-01-22 ENCOUNTER — TELEPHONE (OUTPATIENT)
Dept: FAMILY MEDICINE | Facility: CLINIC | Age: 54
End: 2024-01-22
Payer: COMMERCIAL

## 2024-01-22 NOTE — TELEPHONE ENCOUNTER
Called and spoke with pt, pt has an upcoming appointment 1/25 to address new concerns.    LONG Redd.

## 2024-01-22 NOTE — TELEPHONE ENCOUNTER
Reason for call:  Patient reporting a symptom    Symptom or request: Hemmorhoids    Duration (how long have symptoms been present): comes and goes- 3 times in the past week    Have you been treated for this before? Yes    Additional comments: Patient states it's starting to interfere with her daily life. Would like to see JUSTIN Jin sooner than next available, please call.     Phone Number patient can be reached at:  Home number on file [phone] (home)    Best Time:  anytime    Can we leave a detailed message on this number:  YES    Call taken on 1/22/2024 at 8:22 AM by Crista Warren

## 2024-01-24 ENCOUNTER — VIRTUAL VISIT (OUTPATIENT)
Dept: FAMILY MEDICINE | Facility: CLINIC | Age: 54
End: 2024-01-24
Payer: COMMERCIAL

## 2024-01-24 DIAGNOSIS — K64.4 EXTERNAL HEMORRHOIDS: Primary | ICD-10-CM

## 2024-01-24 PROCEDURE — 99213 OFFICE O/P EST LOW 20 MIN: CPT | Mod: 95 | Performed by: FAMILY MEDICINE

## 2024-01-24 NOTE — PROGRESS NOTES
Luna is a 53 year old who is being evaluated via a billable video visit.      How would you like to obtain your AVS? MyChart  If the video visit is dropped, the invitation should be resent by: NOT READY UNTIL 4:15.  IS AT WORK. Cell phone to cell phone.  Will anyone else be joining your video visit? No          Assessment & Plan   Problem List Items Addressed This Visit    None  Visit Diagnoses       External hemorrhoids    -  Primary- patient presenting with rectal pain/hemorrhoids. Limitations due to virtual visit.  Recommend seeing general surgery.     Relevant Orders    Adult General Surg Referral                  FUTURE APPOINTMENTS:       - Follow-up visit as needed      Subjective   Luna is a 53 year old, presenting for the following health issues:    53 yr old female here for worsening hemorrhoids. Says she has had this for over twenty years and it has flared up every now and then. She reports no bleeding but has pain with bowel movements. She says sometimes in between bowel movements she has pain. She has tried over the counter medication with no relief. She states that she has IBS and her stools swing from firm to soft. She does not feel like she is constipated.   Hemorrhoids (Hemorrhoids-history of this for 21 years with pregnancy. )        8/24/2023     1:12 PM   Additional Questions   Roomed by jacklyn TREJO     Hemorrhoids  Onset/Duration: Worse in the past weeks.  Last week she had three episodes where it was painful for 1 1/2 hours.  Will have to lay down.  Happened twice at [employer] last week where she had to leave.  Description:   Brianda-anal lump: YES- external.  Pain: YES- when she has the flare of symptoms.  Close to a 10 when it flares up. Is afraid to work or travel right now as it comes on so quick.  Itching: Some, but to too bad.  Accompanying Signs & Symptoms:  Blood in stool: YES- sometimes.  Changes in stool pattern: No  History:   Any previous GI studies done:Yes, colonoscopy  on 12-20-18.  Endoscopy in 2016 and 2018.  CT scans.  Family History of colon cancer: YES  Precipitating factors:   Just seems random for the flares.  Alleviating factors:  Will have to lay down.    Therapies tried and outcome: Rx and OTC treatment-using many times per day.        Review of Systems  CONSTITUTIONAL: NEGATIVE for fever, chills, change in weight  ENT/MOUTH: NEGATIVE for ear, mouth and throat problems  RESP: NEGATIVE for significant cough or SOB  CV: NEGATIVE for chest pain, palpitations or peripheral edema  GI: NEGATIVE for nausea, abdominal pain, heartburn, or change in bowel habits and hemorrhoids  : negative for, dysuria, and frequency   NEURO: NEGATIVE for weakness, dizziness or paresthesias  ENDOCRINE: NEGATIVE for temperature intolerance, skin/hair changes  HEME/ALLERGY/IMMUNE: NEGATIVE for bleeding problems  PSYCHIATRIC: NEGATIVE for changes in mood or affect      Objective           Vitals:  No vitals were obtained today due to virtual visit.    Physical Exam   GENERAL: alert and no distress  EYES: Eyes grossly normal to inspection.  No discharge or erythema, or obvious scleral/conjunctival abnormalities.  RESP: No audible wheeze, cough, or visible cyanosis.    SKIN: Visible skin clear. No significant rash, abnormal pigmentation or lesions.  PSYCH: Appropriate affect, tone, and pace of words          Video-Visit Details    Type of service:  Video Visit     Originating Location (pt. Location): Home    Distant Location (provider location):  Off-site  Platform used for Video Visit: Amy  Signed Electronically by: Angel Recinos MD

## 2024-02-15 ENCOUNTER — HOSPITAL ENCOUNTER (OUTPATIENT)
Dept: MAMMOGRAPHY | Facility: CLINIC | Age: 54
Discharge: HOME | End: 2024-02-15
Attending: NURSE PRACTITIONER | Admitting: NURSE PRACTITIONER
Payer: COMMERCIAL

## 2024-02-15 DIAGNOSIS — Z12.31 VISIT FOR SCREENING MAMMOGRAM: ICD-10-CM

## 2024-02-15 PROCEDURE — 77063 BREAST TOMOSYNTHESIS BI: CPT

## 2024-02-20 ENCOUNTER — TELEPHONE (OUTPATIENT)
Dept: FAMILY MEDICINE | Facility: CLINIC | Age: 54
End: 2024-02-20

## 2024-02-20 ENCOUNTER — OFFICE VISIT (OUTPATIENT)
Dept: FAMILY MEDICINE | Facility: CLINIC | Age: 54
End: 2024-02-20
Payer: COMMERCIAL

## 2024-02-20 VITALS
OXYGEN SATURATION: 98 % | HEART RATE: 94 BPM | SYSTOLIC BLOOD PRESSURE: 102 MMHG | TEMPERATURE: 97.7 F | BODY MASS INDEX: 21.75 KG/M2 | HEIGHT: 68 IN | WEIGHT: 143.5 LBS | RESPIRATION RATE: 12 BRPM | DIASTOLIC BLOOD PRESSURE: 78 MMHG

## 2024-02-20 DIAGNOSIS — K58.2 IRRITABLE BOWEL SYNDROME WITH BOTH CONSTIPATION AND DIARRHEA: ICD-10-CM

## 2024-02-20 DIAGNOSIS — F41.8 SITUATIONAL ANXIETY: ICD-10-CM

## 2024-02-20 DIAGNOSIS — K59.4 RECTAL SPHINCTER SPASM: ICD-10-CM

## 2024-02-20 DIAGNOSIS — R10.2 PELVIC PAIN IN FEMALE: ICD-10-CM

## 2024-02-20 DIAGNOSIS — N94.9 VAGINAL SYMPTOM: Primary | ICD-10-CM

## 2024-02-20 DIAGNOSIS — B96.89 BACTERIAL VAGINITIS: ICD-10-CM

## 2024-02-20 DIAGNOSIS — N76.0 BACTERIAL VAGINITIS: ICD-10-CM

## 2024-02-20 DIAGNOSIS — F41.0 PANIC ATTACK: ICD-10-CM

## 2024-02-20 LAB
CLUE CELLS: PRESENT
TRICHOMONAS, WET PREP: ABNORMAL
WBC'S/HIGH POWER FIELD, WET PREP: ABNORMAL
YEAST, WET PREP: ABNORMAL

## 2024-02-20 PROCEDURE — 99215 OFFICE O/P EST HI 40 MIN: CPT | Performed by: NURSE PRACTITIONER

## 2024-02-20 PROCEDURE — 87210 SMEAR WET MOUNT SALINE/INK: CPT | Performed by: NURSE PRACTITIONER

## 2024-02-20 RX ORDER — METRONIDAZOLE 500 MG/1
500 TABLET ORAL 2 TIMES DAILY
Qty: 14 TABLET | Refills: 0 | Status: SHIPPED | OUTPATIENT
Start: 2024-02-20 | End: 2024-02-27

## 2024-02-20 RX ORDER — CYCLOBENZAPRINE HCL 5 MG
5 TABLET ORAL DAILY PRN
Qty: 30 TABLET | Refills: 0 | Status: SHIPPED | OUTPATIENT
Start: 2024-02-20 | End: 2024-03-19

## 2024-02-20 RX ORDER — LORAZEPAM 0.5 MG/1
0.5 TABLET ORAL EVERY 6 HOURS PRN
Qty: 15 TABLET | Refills: 0 | Status: SHIPPED | OUTPATIENT
Start: 2024-02-20 | End: 2024-06-21

## 2024-02-20 ASSESSMENT — ANXIETY QUESTIONNAIRES
8. IF YOU CHECKED OFF ANY PROBLEMS, HOW DIFFICULT HAVE THESE MADE IT FOR YOU TO DO YOUR WORK, TAKE CARE OF THINGS AT HOME, OR GET ALONG WITH OTHER PEOPLE?: SOMEWHAT DIFFICULT
2. NOT BEING ABLE TO STOP OR CONTROL WORRYING: SEVERAL DAYS
5. BEING SO RESTLESS THAT IT IS HARD TO SIT STILL: NOT AT ALL
7. FEELING AFRAID AS IF SOMETHING AWFUL MIGHT HAPPEN: SEVERAL DAYS
3. WORRYING TOO MUCH ABOUT DIFFERENT THINGS: SEVERAL DAYS
7. FEELING AFRAID AS IF SOMETHING AWFUL MIGHT HAPPEN: SEVERAL DAYS
GAD7 TOTAL SCORE: 5
GAD7 TOTAL SCORE: 5
6. BECOMING EASILY ANNOYED OR IRRITABLE: NOT AT ALL
1. FEELING NERVOUS, ANXIOUS, OR ON EDGE: SEVERAL DAYS
4. TROUBLE RELAXING: SEVERAL DAYS
IF YOU CHECKED OFF ANY PROBLEMS ON THIS QUESTIONNAIRE, HOW DIFFICULT HAVE THESE PROBLEMS MADE IT FOR YOU TO DO YOUR WORK, TAKE CARE OF THINGS AT HOME, OR GET ALONG WITH OTHER PEOPLE: SOMEWHAT DIFFICULT
GAD7 TOTAL SCORE: 5

## 2024-02-20 ASSESSMENT — PATIENT HEALTH QUESTIONNAIRE - PHQ9
SUM OF ALL RESPONSES TO PHQ QUESTIONS 1-9: 3
10. IF YOU CHECKED OFF ANY PROBLEMS, HOW DIFFICULT HAVE THESE PROBLEMS MADE IT FOR YOU TO DO YOUR WORK, TAKE CARE OF THINGS AT HOME, OR GET ALONG WITH OTHER PEOPLE: NOT DIFFICULT AT ALL
SUM OF ALL RESPONSES TO PHQ QUESTIONS 1-9: 3

## 2024-02-20 ASSESSMENT — PAIN SCALES - GENERAL: PAINLEVEL: NO PAIN (0)

## 2024-02-20 NOTE — TELEPHONE ENCOUNTER
Medication Question or Refill      Patient calling stating that her pharmacy is unable to fill this medication as it needs to be compounded. She is going to try to call around but requesting a call back.     Contacts         Type Contact Phone/Fax    02/20/2024 03:59 PM CST Phone (Incoming) Luna Rivera (Self) [phone] (H)            What medication are you calling about (include dose and sig)?: diltiazem 2% 2% OINT ointment     Preferred Pharmacy:   [facility] [address] [place] [address] [address] [place] [address]  [address] [facility] [address]  Phone: [phone] Fax: [phone]      Controlled Substance Agreement on file:   CSA -- Patient Level:    CSA: None found at the patient level.       Who prescribed the medication?: Catrina Jin      Do you need a refill? No      Patient offered an appointment? No        Could we send this information to you in [facility] or would you prefer to receive a phone call?:   Patient would prefer a phone call   Okay to leave a detailed message?: Yes at Home number on file [phone] (home)    BHARATHI Marshall

## 2024-02-20 NOTE — PROGRESS NOTES
Assessment & Plan     Vaginal symptom  Positive for bv - treated today.  The risks, benefits and treatment options of prescribed medications or other treatments have been discussed with the patient. The patient verbalized their understanding and should call or follow up if no improvement or if they develop further problems.  - Wet prep - Clinic Collect  - metroNIDAZOLE (FLAGYL) 500 MG tablet  Dispense: 14 tablet; Refill: 0    Situational anxiety  Worsened given recent health concerns and family situation.  Declined daily medication at this time  Follow-up in 1-2 weeks to recheck symptoms.  See AVS     - LORazepam (ATIVAN) 0.5 MG tablet  Dispense: 15 tablet; Refill: 0    Panic attack     - LORazepam (ATIVAN) 0.5 MG tablet  Dispense: 15 tablet; Refill: 0    Irritable bowel syndrome with both constipation and diarrhea  Diet and stress reduction discussed    Rectal sphincter spasm  If not improving with topical may need to follow up with colorectal specialist.    - diltiazem 2% 2% OINT ointment  Dispense: 30 g; Refill: 0  - Physical Therapy Referral    Bacterial vaginitis     - cyclobenzaprine (FLEXERIL) 5 MG tablet  Dispense: 30 tablet; Refill: 0  - metroNIDAZOLE (FLAGYL) 500 MG tablet  Dispense: 14 tablet; Refill: 0    Pelvic pain in female     - Physical Therapy Referral       40 minutes spent by me on the date of the encounter doing chart review, review of outside records, review of test results, interpretation of tests, patient visit, and documentation         See Patient Instructions    Julius Carrasco is a 53 year old, presenting for the following health issues:  Vaginal Problem, Panic Attack, and Rectal Problem (Would like to discuss hemmorhoids)        2/20/2024     2:40 PM   Additional Questions   Roomed by Migel LABOY CMA   Accompanied by self     Vaginal Problem     History of Present Illness       Mental Health Follow-up:  Patient presents to follow-up on Anxiety.    Patient's anxiety since last visit  has been:  Worse  The patient is having other symptoms associated with anxiety.  Any significant life events: grief or loss and other  Patient is feeling anxious or having panic attacks.  Patient has no concerns about alcohol or drug use.    She eats 2-3 servings of fruits and vegetables daily.She consumes 0 sweetened beverage(s) daily.She exercises with enough effort to increase her heart rate 10 to 19 minutes per day.  She exercises with enough effort to increase her heart rate 4 days per week.   She is taking medications regularly.     Usually prayer helps with panic episodes.  Has tried Lorazepam     PMH irritable bowel syndrome, anxiety - was on Lexapro and had side effects (GI), citalopram stopped due to unknown    Rectal spasms/pain  Onset/Duration: 2002  Description:   Brianda-anal lump: YES  Pain: YES and getting more frequent, spiratic stabbing pain  - patient wondering if this is a Pelvic floor issue with the increased pain , Patients Chiropractor suggested this as well .   Itching: No  Accompanying Signs & Symptoms:  Blood in stool: No  Changes in stool pattern: No  History:   Any previous GI studies done:Colonoscopy  Family History of colon cancer: No  Precipitating factors:   None  Alleviating factors:  None  Therapies tried and outcome: none    Worsened due to panic symptoms, and worsened stressors.  No hemorrhoids reported but has had history.    No blood in stool, abdominal pain.  Bowel movements vary - history of irritable bowel syndrome.      Vaginal Symptoms  Onset/Duration: x 1 week   Description:  Vaginal Discharge: clear   Itching (Pruritis): YES  Burning sensation:  No  Odor: YES  Accompanying Signs & Symptoms:  Urinary symptoms: No  Abdominal pain: No  Fever: No  History:   Sexually active: YES  New Partner: No  Possibility of Pregnancy:  No  Recent antibiotic use: No  Previous vaginitis issues: YES  Precipitating or alleviating factors: None  Therapies tried and outcome: none    No new sexual  "partners..    Review of Systems  Constitutional, neuro, ENT, endocrine, pulmonary, cardiac, gastrointestinal, genitourinary, musculoskeletal, integument and psychiatric systems are negative, except as otherwise noted.  POS for panic attacks - worse lately with son's mental health decline.  No SI reports or depressive symptoms. Not on medication for this.   POS for ongoing pelvic symptoms - vaginal discharge. Seeing chiro that recommended pelvic floor therapy for pelvic deformity.      Objective    /78 (BP Location: Right arm, Patient Position: Sitting, Cuff Size: Adult Regular)   Pulse 94   Temp 97.7  F (36.5  C) (Tympanic)   Resp 12   Ht 1.721 m (5' 7.75\")   Wt 65.1 kg (143 lb 8 oz)   LMP 02/25/2018   SpO2 98%   BMI 21.98 kg/m    Body mass index is 21.98 kg/m .  Physical Exam   GENERAL: alert and no distress  NECK: no adenopathy, no asymmetry, masses, or scars  RESP: lungs clear to auscultation - no rales, rhonchi or wheezes  CV: regular rate and rhythm, normal S1 S2, no S3 or S4, no murmur, click or rub, no peripheral edema  ABDOMEN: soft, nontender, no hepatosplenomegaly, no masses and bowel sounds normal  MS: no gross musculoskeletal defects noted, no edema  PSYCH: mentation appears normal, anxious, fatigued, and judgement and insight intact        Signed Electronically by: Catrina Jin DNP     "

## 2024-02-21 NOTE — TELEPHONE ENCOUNTER
Called pt. No answer. Left message to call if was not able to find compounding pharmacy      Jono Paige RN

## 2024-02-22 NOTE — TELEPHONE ENCOUNTER
The patient was contacted and she was able to drop off at [facility].  She will call us if she needs further assistance.    Thank you    Reyna CUTLER RN

## 2024-03-14 ENCOUNTER — THERAPY VISIT (OUTPATIENT)
Dept: PHYSICAL THERAPY | Facility: CLINIC | Age: 54
End: 2024-03-14
Attending: NURSE PRACTITIONER
Payer: COMMERCIAL

## 2024-03-14 DIAGNOSIS — R10.2 PELVIC PAIN IN FEMALE: ICD-10-CM

## 2024-03-14 DIAGNOSIS — K59.4 RECTAL SPHINCTER SPASM: Primary | ICD-10-CM

## 2024-03-14 PROCEDURE — 97161 PT EVAL LOW COMPLEX 20 MIN: CPT | Mod: GP | Performed by: PHYSICAL THERAPIST

## 2024-03-14 PROCEDURE — 97530 THERAPEUTIC ACTIVITIES: CPT | Mod: GP | Performed by: PHYSICAL THERAPIST

## 2024-03-14 NOTE — PROGRESS NOTES
PHYSICAL THERAPY EVALUATION  Type of Visit: Evaluation    See electronic medical record for Abuse and Falls Screening details.    Subjective       Presenting condition or subjective complaint: Rectal pain, Patient states that she was seeing a chiropractor and they recommended pelvic floor PT due to her rectal pain. Has a history of 2 very difficult pregnancies and deliveries as well as hemorrhoids/constipation. Since her hysterectomy, she has been having these rectal pains that can last an hour and will drop her to her knees. Was having a couple a week, however has not had one in the last 4 weeks but very fearful of this happening at work or out in public. Patient is also having pain with intercourse and this is affecting her relationship. She is also having some urinary frequency and was going to the bathroom almost every hour however this has been improving.   Date of onset: 02/20/24    Relevant medical history:   bilateral hip surgery, anxiety, IBS, constipation and diarrhea, pelvic pain  Dates & types of surgery: Hysterectomy 2018    Prior diagnostic imaging/testing results:       Prior therapy history for the same diagnosis, illness or injury: No        Living Environment  Social support: With family members   Type of home: House   Stairs to enter the home: Yes 2 Is there a railing: No   Ramp: No   Stairs inside the home: Yes 12 Is there a railing: Yes   Help at home: None  Equipment owned:       Employment: Yes   Hobbies/Interests: Walking, hiking, kayaking, swimming, boating    Patient goals for therapy: Not have unexpected severe pain    Pain assessment: Pain denied     Objective      PELVIC EVALUATION  ADDITIONAL HISTORY:  Sex assigned at birth: Female  Gender identity: Female    Pronouns: She/Her Hers      Bladder History:  Feels bladder filling: No  Triggers for feeling of inability to wait to go to the bathroom: No    How long can you wait to urinate: Varies  Gets up at night to urinate: No     Can stop the flow of urine when urinating: Yes  Volume of urine usually released: Large   Other issues: Bladder infections  Number of bladder infections in last 12 months: 2  Fluid intake per day: 36 oz 3 cups of coffee Rarely  Medications taken for bladder: No     Activities causing urine leak:      Amount of urine typically leaked:    Pads used to help with leaking:          Bowel History:  Frequency of bowel movement: Onve a day  Consistency of stool: Hard    Ignores the urge to defecate: Yes  Other bowel issues: Straining to have bowel movement  Length of time spent trying to have a bowel movement: 5 min    Sexual Function History:  Sexual orientation: Straight    Sexually active: Yes  Lubrication used: Yes Yes  Pelvic pain: Initial penetration (rectal or vaginal); Deep penetration (rectal or vaginal)    Pain or difficulty with orgasms/erection/ejaculation: No    State of menopause: During menopause (I am in menopause now)  Hormone medications: No      Are you currently pregnant: No, Number of previous pregnancies: 2, Number of deliveries: 2, If you have delivered before, did you have any of these issues during delivery:  delivery, Have you been diagnosed with pelvic prolapse or abdominal separation: No, Do you get regular exercise: Yes, I do this type of exercise: Walking, Have you tried pelvic floor strengthening exercises for 4 weeks: No, Do you have any history of trauma that is relevant to your care that you d like to share: No    Discussed reason for referral regarding pelvic health needs and external/internal pelvic floor muscle examination with patient/guardian.  Opportunity provided to ask questions and verbal consent for assessment and intervention was given.    LUMBAR SCREEN: AROM WNL  Some tightness in low back and abdominal region  HIP SCREEN:  Strength: WNL  Some stiffness      PELVIC/SI SCREEN:  WNL   BREATHING SYMMETRY: Asymmetrical, Decreased rib cage mobility    PELVIC EXAM  External  Visual Inspection:  Did not formally assess, will assess with internal assessment in subsequent visits     ABDOMINAL ASSESSMENT  Diastasis Rectus Abdominis (HELEN):  Location - will assess next visit    Abdominal Activation/Strength:  will assess next visit    Scar:   Location/Type:  scar  Mobility: Hypomobile    Fascial Tension/Restriction: Hypomobile      Assessment & Plan   CLINICAL IMPRESSIONS  Medical Diagnosis: Rectal sphincter spasm, Pelvic pain in female    Treatment Diagnosis: pelvic floor dysfunction   Impression/Assessment: Patient is a 53 year old female with pelvic floor complaints.  The following significant findings have been identified: Pain, Decreased ROM/flexibility, Decreased joint mobility, Decreased strength, Impaired muscle performance, and Decreased activity tolerance. These impairments interfere with their ability to perform self care tasks, work tasks, recreational activities, household chores, driving , household mobility, and community mobility as compared to previous level of function.     Clinical Decision Making (Complexity):  Clinical Presentation: Stable/Uncomplicated  Clinical Presentation Rationale: based on medical and personal factors listed in PT evaluation  Clinical Decision Making (Complexity): Low complexity    PLAN OF CARE  Treatment Interventions:  Modalities: Biofeedback, E-stim, Ultrasound  Interventions: Manual Therapy, Neuromuscular Re-education, Therapeutic Activity, Therapeutic Exercise, Self-Care/Home Management    Long Term Goals     PT Goal 1  Goal Identifier: 1.  Goal Description: Patient will be able to void more frequently every 2-4 hours.  Target Date: 24  PT Goal 2  Goal Identifier: 2.  Goal Description: Patient will report a 90% decrease in rectal spasms in order to enjoy recreational activities.  Target Date: 24  PT Goal 3  Goal Identifier: 3.  Goal Description: Patient will report ability to have intercourse without pain  Target Date:  05/23/24  PT Goal 4  Goal Identifier: 4.  Goal Description: Patient will demonstrate ability to fully contract/relax PFM in order to optimize function.  Target Date: 05/23/24      Frequency of Treatment: 1x/week  Duration of Treatment: 10 weeks    Education Assessment:        Risks and benefits of evaluation/treatment have been explained.   Patient/Family/caregiver agrees with Plan of Care.     Evaluation Time:     PT Eval, Low Complexity Minutes (53023): 20     Signing Clinician: Jessika Barrera PT

## 2024-03-16 SDOH — HEALTH STABILITY: PHYSICAL HEALTH: ON AVERAGE, HOW MANY DAYS PER WEEK DO YOU ENGAGE IN MODERATE TO STRENUOUS EXERCISE (LIKE A BRISK WALK)?: 4 DAYS

## 2024-03-16 SDOH — HEALTH STABILITY: PHYSICAL HEALTH: ON AVERAGE, HOW MANY MINUTES DO YOU ENGAGE IN EXERCISE AT THIS LEVEL?: 20 MIN

## 2024-03-16 ASSESSMENT — SOCIAL DETERMINANTS OF HEALTH (SDOH): HOW OFTEN DO YOU GET TOGETHER WITH FRIENDS OR RELATIVES?: TWICE A WEEK

## 2024-03-19 ENCOUNTER — OFFICE VISIT (OUTPATIENT)
Dept: FAMILY MEDICINE | Facility: CLINIC | Age: 54
End: 2024-03-19
Payer: COMMERCIAL

## 2024-03-19 ENCOUNTER — TELEPHONE (OUTPATIENT)
Dept: FAMILY MEDICINE | Facility: CLINIC | Age: 54
End: 2024-03-19

## 2024-03-19 VITALS
TEMPERATURE: 97.3 F | HEART RATE: 77 BPM | OXYGEN SATURATION: 98 % | SYSTOLIC BLOOD PRESSURE: 106 MMHG | DIASTOLIC BLOOD PRESSURE: 80 MMHG | BODY MASS INDEX: 22.04 KG/M2 | HEIGHT: 68 IN | RESPIRATION RATE: 16 BRPM | WEIGHT: 145.4 LBS

## 2024-03-19 DIAGNOSIS — Z00.00 ROUTINE GENERAL MEDICAL EXAMINATION AT A HEALTH CARE FACILITY: Primary | ICD-10-CM

## 2024-03-19 DIAGNOSIS — N95.1 MENOPAUSAL SYMPTOMS: ICD-10-CM

## 2024-03-19 DIAGNOSIS — K59.4 RECTAL SPHINCTER SPASM: ICD-10-CM

## 2024-03-19 DIAGNOSIS — Z90.710 S/P TAH (TOTAL ABDOMINAL HYSTERECTOMY): ICD-10-CM

## 2024-03-19 DIAGNOSIS — F41.1 GAD (GENERALIZED ANXIETY DISORDER): ICD-10-CM

## 2024-03-19 DIAGNOSIS — E55.9 VITAMIN D DEFICIENCY: ICD-10-CM

## 2024-03-19 DIAGNOSIS — F41.8 SITUATIONAL ANXIETY: ICD-10-CM

## 2024-03-19 DIAGNOSIS — R10.2 PELVIC PAIN IN FEMALE: ICD-10-CM

## 2024-03-19 DIAGNOSIS — Z13.6 CARDIOVASCULAR SCREENING; LDL GOAL LESS THAN 160: ICD-10-CM

## 2024-03-19 LAB
CHOLEST SERPL-MCNC: 239 MG/DL
FASTING STATUS PATIENT QL REPORTED: YES
FASTING STATUS PATIENT QL REPORTED: YES
GLUCOSE SERPL-MCNC: 94 MG/DL (ref 70–99)
HDLC SERPL-MCNC: 89 MG/DL
LDLC SERPL CALC-MCNC: 132 MG/DL
NONHDLC SERPL-MCNC: 150 MG/DL
TRIGL SERPL-MCNC: 91 MG/DL
TSH SERPL DL<=0.005 MIU/L-ACNC: 1.97 UIU/ML (ref 0.3–4.2)
VIT D+METAB SERPL-MCNC: 52 NG/ML (ref 20–50)

## 2024-03-19 PROCEDURE — 82306 VITAMIN D 25 HYDROXY: CPT | Performed by: NURSE PRACTITIONER

## 2024-03-19 PROCEDURE — 80061 LIPID PANEL: CPT | Performed by: NURSE PRACTITIONER

## 2024-03-19 PROCEDURE — 36415 COLL VENOUS BLD VENIPUNCTURE: CPT | Performed by: NURSE PRACTITIONER

## 2024-03-19 PROCEDURE — 84443 ASSAY THYROID STIM HORMONE: CPT | Performed by: NURSE PRACTITIONER

## 2024-03-19 PROCEDURE — 82947 ASSAY GLUCOSE BLOOD QUANT: CPT | Performed by: NURSE PRACTITIONER

## 2024-03-19 PROCEDURE — 99396 PREV VISIT EST AGE 40-64: CPT | Performed by: NURSE PRACTITIONER

## 2024-03-19 RX ORDER — BUSPIRONE HYDROCHLORIDE 10 MG/1
10 TABLET ORAL 2 TIMES DAILY
Qty: 60 TABLET | Refills: 2 | Status: SHIPPED | OUTPATIENT
Start: 2024-03-19 | End: 2024-04-17

## 2024-03-19 RX ORDER — CYCLOBENZAPRINE HCL 5 MG
5 TABLET ORAL DAILY PRN
Qty: 30 TABLET | Refills: 1 | Status: SHIPPED | OUTPATIENT
Start: 2024-03-19 | End: 2024-04-17

## 2024-03-19 RX ORDER — CYCLOBENZAPRINE HCL 5 MG
5 TABLET ORAL DAILY PRN
Qty: 30 TABLET | Refills: 1 | Status: CANCELLED | OUTPATIENT
Start: 2024-03-19

## 2024-03-19 RX ORDER — BUSPIRONE HYDROCHLORIDE 10 MG/1
10 TABLET ORAL 2 TIMES DAILY
Qty: 60 TABLET | Refills: 2 | Status: CANCELLED | OUTPATIENT
Start: 2024-03-19

## 2024-03-19 ASSESSMENT — PAIN SCALES - GENERAL: PAINLEVEL: NO PAIN (0)

## 2024-03-19 NOTE — TELEPHONE ENCOUNTER
Returned call to patient and advised her to call pharmacy and have Rxs transferred versus sending new Rxs and having at both places.  Understanding voiced.    Kyra Sawyer RN  [facility]

## 2024-03-19 NOTE — PATIENT INSTRUCTIONS
For anxiety - will trial use of Buspirone (Buspar) 10 mg 1-2 x daily for now.  Monitor symptoms and may continue use of Lorazepam as needed for severe anxiety and panic attacks.  Discussed utilizing yoga, meditation, and relaxation for pelvic floor/rectal spasms.    Catrina Jin DNP    Preventive Care Advice   This is general advice given by our system to help you stay healthy. However, your care team may have specific advice just for you. Please talk to your care team about your preventive care needs.  Nutrition  Eat 5 or more servings of fruits and vegetables each day.  Try wheat bread, brown rice and whole grain pasta (instead of white bread, rice, and pasta).  Get enough calcium and vitamin D. Check the label on foods and aim for 100% of the RDA (recommended daily allowance).  Lifestyle  Exercise at least 150 minutes each week   (30 minutes a day, 5 days a week).  Do muscle strengthening activities 2 days a week. These help control your weight and prevent disease.  No smoking.  Wear sunscreen to prevent skin cancer.  Have a dental exam and cleaning every 6 months.  Yearly exams  See your health care team every year to talk about:  Any changes in your health.  Any medicines your care team has prescribed.  Preventive care, family planning, and ways to prevent chronic diseases.  Shots (vaccines)   HPV shots (up to age 26), if you've never had them before.  Hepatitis B shots (up to age 59), if you've never had them before.  COVID-19 shot: Get this shot when it's due.  Flu shot: Get a flu shot every year.  Tetanus shot: Get a tetanus shot every 10 years.  Pneumococcal, hepatitis A, and RSV shots: Ask your care team if you need these based on your risk.  Shingles shot (for age 50 and up).  General health tests  Diabetes screening:  Starting at age 35, Get screened for diabetes at least every 3 years.  If you are younger than age 35, ask your care team if you should be screened for diabetes.  Cholesterol test: At  age 39, start having a cholesterol test every 5 years, or more often if advised.  Bone density scan (DEXA): At age 50, ask your care team if you should have this scan for osteoporosis (brittle bones).  Hepatitis C: Get tested at least once in your life.  STIs (sexually transmitted infections)  Before age 24: Ask your care team if you should be screened for STIs.  After age 24: Get screened for STIs if you're at risk. You are at risk for STIs (including HIV) if:  You are sexually active with more than one person.  You don't use condoms every time.  You or a partner was diagnosed with a sexually transmitted infection.  If you are at risk for HIV, ask about PrEP medicine to prevent HIV.  Get tested for HIV at least once in your life, whether you are at risk for HIV or not.  Cancer screening tests  Cervical cancer screening: If you have a cervix, begin getting regular cervical cancer screening tests at age 21. Most people who have regular screenings with normal results can stop after age 65. Talk about this with your provider.  Breast cancer scan (mammogram): If you've ever had breasts, begin having regular mammograms starting at age 40. This is a scan to check for breast cancer.  Colon cancer screening: It is important to start screening for colon cancer at age 45.  Have a colonoscopy test every 10 years (or more often if you're at risk) Or, ask your provider about stool tests like a FIT test every year or Cologuard test every 3 years.  To learn more about your testing options, visit: https://www.[email or web address] Ring/280383.pdf.  For help making a decision, visit: https://bit.ly/ez98402.  Prostate cancer screening test: If you have a prostate and are age 55 to 69, ask your provider if you would benefit from a yearly prostate cancer screening test.  Lung cancer screening: If you are a current or former smoker age 50 to 80, ask your care team if ongoing lung cancer screenings are right for you.  For informational purposes only.  Not to replace the advice of your health care provider. Copyright   2023 [facility]. All rights reserved. Clinically reviewed by the [facility] Transitions Program. [employer] 427181 - REV 01/24.    Learning About Stress  What is stress?     Stress is your body's response to a hard situation. Your body can have a physical, emotional, or mental response. Stress is a fact of life for most people, and it affects everyone differently. What causes stress for you may not be stressful for someone else.  A lot of things can cause stress. You may feel stress when you go on a job interview, take a test, or run a race. This kind of short-term stress is normal and even useful. It can help you if you need to work hard or react quickly. For example, stress can help you finish an important job on time.  Long-term stress is caused by ongoing stressful situations or events. Examples of long-term stress include long-term health problems, ongoing problems at work, or conflicts in your family. Long-term stress can harm your health.  How does stress affect your health?  When you are stressed, your body responds as though you are in danger. It makes hormones that speed up your heart, make you breathe faster, and give you a burst of energy. This is called the fight-or-flight stress response. If the stress is over quickly, your body goes back to normal and no harm is done.  But if stress happens too often or lasts too long, it can have bad effects. Long-term stress can make you more likely to get sick, and it can make symptoms of some diseases worse. If you tense up when you are stressed, you may develop neck, shoulder, or low back pain. Stress is linked to high blood pressure and heart disease.  Stress also harms your emotional health. It can make you mills, tense, or depressed. Your relationships may suffer, and you may not do well at work or school.  What can you do to manage stress?  You can try these things to  help manage stress:   Do something active. Exercise or activity can help reduce stress. Walking is a great way to get started. Even everyday activities such as housecleaning or yard work can help.  Try yoga or cori chi. These techniques combine exercise and meditation. You may need some training at first to learn them.  Do something you enjoy. For example, listen to music or go to a movie. Practice your hobby or do volunteer work.  Meditate. This can help you relax, because you are not worrying about what happened before or what may happen in the future.  Do guided imagery. Imagine yourself in any setting that helps you feel calm. You can use online videos, books, or a teacher to guide you.  Do breathing exercises. For example:  From a standing position, bend forward from the waist with your knees slightly bent. Let your arms dangle close to the floor.  Breathe in slowly and deeply as you return to a standing position. Roll up slowly and lift your head last.  Hold your breath for just a few seconds in the standing position.  Breathe out slowly and bend forward from the waist.  Let your feelings out. Talk, laugh, cry, and express anger when you need to. Talking with supportive friends or family, a counselor, or a adarsh leader about your feelings is a healthy way to relieve stress. Avoid discussing your feelings with people who make you feel worse.  Write. It may help to write about things that are bothering you. This helps you find out how much stress you feel and what is causing it. When you know this, you can find better ways to cope.  What can you do to prevent stress?  You might try some of these things to help prevent stress:  Manage your time. This helps you find time to do the things you want and need to do.  Get enough sleep. Your body recovers from the stresses of the day while you are sleeping.  Get support. Your family, friends, and community can make a difference in how you experience stress.  Limit your  "news feed. Avoid or limit time on social media or news that may make you feel stressed.  Do something active. Exercise or activity can help reduce stress. Walking is a great way to get started.  Where can you learn more?  Go to https://www.[employer].net/patiented  Enter N032 in the search box to learn more about \"Learning About Stress.\"  Current as of: October 24, 2023               Content Version: 14.0    2727-4839 [employer].   Care instructions adapted under license by your healthcare professional. If you have questions about a medical condition or this instruction, always ask your healthcare professional. [employer] disclaims any warranty or liability for your use of this information.      "

## 2024-03-19 NOTE — TELEPHONE ENCOUNTER
Patient calling. States that only the Cream was supposed to be sent to [facility] as it needed to be sent to a compounding pharmacy.     Buspirone and cyclobenzaprine are to be sent to [facility] in [place]      Preferred Pharmacy: [facility] - [place], [place] - [address]   [address] [facility] [address]  Phone: [phone] Fax: [phone]      Could we send this information to you in [facility] or would you prefer to receive a phone call?:   Patient would prefer a phone call   Okay to leave a detailed message?: Yes at Home number on file [phone] (home)

## 2024-03-19 NOTE — RESULT ENCOUNTER NOTE
Luna,  All of your lab results are normal.    The Vitamin D is pending.  Please notify patient of results.  COURTNEY Curiel

## 2024-03-19 NOTE — PROGRESS NOTES
Preventive Care Visit  [facility]  Catrina Jin DNP, Family Medicine  Mar 19, 2024      Assessment & Plan     Routine general medical examination at a health care facility       CARIDAD (generalized anxiety disorder)   See AVS - will start Buspar 10 mg 1-2 x daily.  Follow-up in 1-2 months to recheck anxiety and symptoms/medications.    Situational anxiety     - TSH with free T4 reflex    Rectal sphincter spasm  Sent prescription for Diltiazem topical for treatment of rectal spasms.  If not improving discussed referral to specialist.    - diltiazem 2% 2% OINT ointment  Dispense: 30 g; Refill: 0  - cyclobenzaprine (FLEXERIL) 5 MG tablet  Dispense: 30 tablet; Refill: 1       Pelvic pain in female  Continue follow up with pelvic floor program    S/P TRACI (total abdominal hysterectomy)       Vitamin D deficiency     - Vitamin D Deficiency    Menopausal symptoms     - busPIRone (BUSPAR) 10 MG tablet  Dispense: 60 tablet; Refill: 2  - TSH with free T4 reflex    CARDIOVASCULAR SCREENING; LDL GOAL LESS THAN 160     - Lipid panel reflex to direct LDL Fasting  - Glucose      Patient has been advised of split billing requirements and indicates understanding: Yes         Counseling  Appropriate preventive services were discussed with this patient, including applicable screening as appropriate for fall prevention, nutrition, physical activity, Tobacco-use cessation, weight loss and cognition.  Checklist reviewing preventive services available has been given to the patient.  Reviewed patient's diet, addressing concerns and/or questions.       Regular exercise  See Patient Instructions    Julius Carrasco is a 53 year old, presenting for the following:  Physical        3/19/2024     9:50 AM   Additional Questions   Roomed by Migel LABOY CMA   Accompanied by self        Health Care Directive  Patient does not have a Health Care Directive or Living Will: Discussed advance care planning with patient;  however, patient declined at this time.    HPI     Currently doing pelvic floor treatment and had first treatment last week.  Anxiety   How are you doing with your anxiety since your last visit? Worsened situational stressors  Are you having other symptoms that might be associated with anxiety? No  Have you had a significant life event? Health Concerns and OTHER: family concerns    Are you feeling depressed? No  Do you have any concerns with your use of alcohol or other drugs? No    Social History     Tobacco Use    Smoking status: Never    Smokeless tobacco: Never   Vaping Use    Vaping Use: Never used   Substance Use Topics    Alcohol use: Yes     Comment: rarely    Drug use: Never         12/13/2011    11:06 AM 8/17/2017     3:13 PM 2/20/2024     2:35 PM   CARIDAD-7 SCORE   Total Score 0     Total Score   5 (mild anxiety)   Total Score  11 5         8/17/2017     3:13 PM 2/20/2024     2:34 PM   PHQ   PHQ-9 Total Score 7 3   Q9: Thoughts of better off dead/self-harm past 2 weeks Not at all Not at all         2/20/2024     2:34 PM   Last PHQ-9   1.  Little interest or pleasure in doing things 0   2.  Feeling down, depressed, or hopeless 0   3.  Trouble falling or staying asleep, or sleeping too much 1   4.  Feeling tired or having little energy 1   5.  Poor appetite or overeating 1   6.  Feeling bad about yourself 0   7.  Trouble concentrating 0   8.  Moving slowly or restless 0   Q9: Thoughts of better off dead/self-harm past 2 weeks 0   PHQ-9 Total Score 3         2/20/2024     2:35 PM   CARIDAD-7    1. Feeling nervous, anxious, or on edge 1   2. Not being able to stop or control worrying 1   3. Worrying too much about different things 1   4. Trouble relaxing 1   5. Being so restless that it is hard to sit still 0   6. Becoming easily annoyed or irritable 0   7. Feeling afraid, as if something awful might happen 1   CARIDAD-7 Total Score 5   If you checked any problems, how difficult have they made it for you to do your  work, take care of things at home, or get along with other people? Somewhat difficult              3/16/2024   General Health   How would you rate your overall physical health? Good   Feel stress (tense, anxious, or unable to sleep) Rather much   (!) STRESS CONCERN      3/16/2024   Nutrition   Three or more servings of calcium each day? Yes   Diet: Regular (no restrictions)   How many servings of fruit and vegetables per day? (!) 2-3   How many sweetened beverages each day? 0-1         3/16/2024   Exercise   Days per week of moderate/strenous exercise 4 days   Average minutes spent exercising at this level 20 min         3/16/2024   Social Factors   Frequency of gathering with friends or relatives Twice a week   Worry food won't last until get money to buy more No   Food not last or not have enough money for food? No   Do you have housing?  Yes   Are you worried about losing your housing? No   Lack of transportation? No   Unable to get utilities (heat,electricity)? No         3/16/2024   Fall Risk   Fallen 2 or more times in the past year? No   Trouble with walking or balance? No          3/16/2024   Dental   Dentist two times every year? Yes         3/16/2024   TB Screening   Were you born outside of the [place]? No         Today's PHQ-2 Score:       3/19/2024     9:46 AM   PHQ-2 ( 1999 [employer])   Q1: Little interest or pleasure in doing things 0   Q2: Feeling down, depressed or hopeless 0   PHQ-2 Score 0   Q1: Little interest or pleasure in doing things Not at all   Q2: Feeling down, depressed or hopeless Not at all   PHQ-2 Score 0           3/16/2024   Substance Use   Alcohol more than 3/day or more than 7/wk No   Do you use any other substances recreationally? No     Social History     Tobacco Use    Smoking status: Never    Smokeless tobacco: Never   Vaping Use    Vaping Use: Never used   Substance Use Topics    Alcohol use: Yes     Comment: rarely    Drug use: Never          2/15/2024   LAST FHS-7 RESULTS   1st  degree relative breast or ovarian cancer Yes   Any relative bilateral breast cancer No   Any male have breast cancer No   Any ONE woman have BOTH breast AND ovarian cancer No   Any woman with breast cancer before 50yrs Yes   2 or more relatives with breast AND/OR ovarian cancer No   2 or more relatives with breast AND/OR bowel cancer No      Mammogram Screening - Mammogram every 1-2 years updated in Health Maintenance based on mutual decision making          3/16/2024   One time HIV Screening   Previous HIV test? Yes         3/16/2024   STI Screening   New sexual partner(s) since last STI/HIV test? No     History of abnormal Pap smear: NO - age 30-65 PAP every 5 years with negative HPV co-testing recommended  Last 3 Pap Results:   PAP (no units)   Date Value   05/22/2014 NIL   10/15/2010 NIL   08/29/2007 NIL           5/22/2014    12:00 AM 10/15/2010    12:00 AM 8/29/2007    12:00 AM   PAP / HPV   PAP (Historical) NIL  NIL  NIL      ASCVD Risk   The 10-year ASCVD risk score (Grisel PHILLIPS, et al., 2019) is: 1%    Values used to calculate the score:      Age: 53 years      Sex: Female      Is Non- : No      Diabetic: No      Tobacco smoker: No      Systolic Blood Pressure: 106 mmHg      Is BP treated: No      HDL Cholesterol: 79 mg/dL      Total Cholesterol: 224 mg/dL      Reviewed and updated as needed this visit by Provider     Meds                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    Contact dermatitis and other eczema, due to unspecified cause     Enteritis due to [place] virus 12/2002    Herpes simplex without mention of complication     Last outbreak 1/05    Intramural and subserous leiomyoma of uterus 03/19/2018    Irritable bowel syndrome     PONV (postoperative nausea and vomiting)      Past Surgical History:   Procedure Laterality Date    ABDOMEN SURGERY  6/29/2000  2002    c section    ARTHROSCOPY KNEE WITH MEDIAL MENISCECTOMY  2014    Procedure: ARTHROSCOPY KNEE WITH MEDIAL MENISCECTOMY;  Surgeon: Alejandro Dodge MD;  Location: [facility]    COLONOSCOPY N/A 2018    Procedure: COLONOSCOPY;  Surgeon: Jose Mora MD;  Location: [facility]    ESOPHAGOSCOPY, GASTROSCOPY, DUODENOSCOPY (EGD), COMBINED N/A 2016    Procedure: COMBINED ESOPHAGOSCOPY, GASTROSCOPY, DUODENOSCOPY (EGD), BIOPSY SINGLE OR MULTIPLE;  Surgeon: Jose Mora MD;  Location: [facility]    ESOPHAGOSCOPY, GASTROSCOPY, DUODENOSCOPY (EGD), COMBINED N/A 2018    Procedure: COMBINED ESOPHAGOSCOPY, GASTROSCOPY, DUODENOSCOPY (EGD);  Surgeon: Jose Mora MD;  Location: [facility]    HC HYSTEROSCOPY, SURGICAL; W/ ENDOMETRIAL ABLATION, ANY METHOD  2010    Novasure ablation    HYSTERECTOMY TOTAL ABDOMINAL, BILATERAL SALPINGO-OOPHORECTOMY, COMBINED Bilateral 2018    TRACI only; tubes removed; ovaries left in    ORTHOPEDIC SURGERY  ;11/15;12/15    meniscus repair; L hip scope; R hip scope    ZZC  DELIVERY ONLY  2002    ZZC LIGATE FALLOPIAN TUBE,POSTPARTUM       OB History    Para Term  AB Living   2 2 2 0 0 2   SAB IAB Ectopic Multiple Live Births   0 0 0 0 2      # Outcome Date GA Lbr Xavier/2nd Weight Sex Delivery Anes PTL Lv   2 Term 02 37w0d  3.827 kg (8 lb 7 oz) M CS   KODI      Birth Comments: PTL, hyperemesis      Apgar1: 6  Apgar5: 8   1 Term 00 39w0d 38:00 3.544 kg (7 lb 13 oz) F CS   KODI      Birth Comments: Failure to progress, hyperemesis      Name: Leopoldo     Lab work is in process  Labs reviewed in EPIC  BP Readings from Last 3 Encounters:   24 106/80   24 102/78   23 106/78    Wt Readings from Last 3 Encounters:   24 66 kg (145 lb 6.4 oz)   24 65.1 kg (143 lb 8 oz)   22 64 kg (141 lb)                  Patient Active Problem List   Diagnosis    Allergic rhinitis    CARIDAD (generalized anxiety disorder)     Family history of breast cancer in mother    CARDIOVASCULAR SCREENING; LDL GOAL LESS THAN 160    Acne    Gastroesophageal reflux disease without esophagitis    Situational anxiety    Grief reaction    S/P TRACI (total abdominal hysterectomy)    Menopausal symptoms    Chronic dermatitis of hands    Vaginal atrophy    Irritable bowel syndrome with both constipation and diarrhea    Panic attack    Rectal sphincter spasm    Pelvic pain in female     Past Surgical History:   Procedure Laterality Date    ABDOMEN SURGERY  2000    c section    ARTHROSCOPY KNEE WITH MEDIAL MENISCECTOMY  2014    Procedure: ARTHROSCOPY KNEE WITH MEDIAL MENISCECTOMY;  Surgeon: Alejandro Dodge MD;  Location: [facility]    COLONOSCOPY N/A 2018    Procedure: COLONOSCOPY;  Surgeon: Jose Mora MD;  Location: [facility]    ESOPHAGOSCOPY, GASTROSCOPY, DUODENOSCOPY (EGD), COMBINED N/A 2016    Procedure: COMBINED ESOPHAGOSCOPY, GASTROSCOPY, DUODENOSCOPY (EGD), BIOPSY SINGLE OR MULTIPLE;  Surgeon: Jose Mora MD;  Location: [facility]    ESOPHAGOSCOPY, GASTROSCOPY, DUODENOSCOPY (EGD), COMBINED N/A 2018    Procedure: COMBINED ESOPHAGOSCOPY, GASTROSCOPY, DUODENOSCOPY (EGD);  Surgeon: Jose Mora MD;  Location: [facility]    HC HYSTEROSCOPY, SURGICAL; W/ ENDOMETRIAL ABLATION, ANY METHOD  2010    Novasure ablation    HYSTERECTOMY TOTAL ABDOMINAL, BILATERAL SALPINGO-OOPHORECTOMY, COMBINED Bilateral 2018    TRACI only; tubes removed; ovaries left in    ORTHOPEDIC SURGERY  ;11/15;12/15    meniscus repair; L hip scope; R hip scope    ZZC  DELIVERY ONLY  2002    ZZC LIGATE FALLOPIAN TUBE,POSTPARTUM         Social History     Tobacco Use    Smoking status: Never    Smokeless tobacco: Never   Substance Use Topics    Alcohol use: Yes     Comment: rarely     Family History   Problem Relation Age of Onset    Breast Cancer Mother         age 46    Cancer Mother         Throat, larynx (d/t radiation from  breast cancer)tongue cancer 9/2011    Hypertension Father     Cancer Father         lung CA    Other Cancer Father         Lung    Neurologic Disorder Maternal Grandmother         Parkinson's    C.A.D. Maternal Grandmother     Thyroid Disease Maternal Grandmother         hyperthyroid    Arthritis Maternal Grandfather     Heart Disease Maternal Grandfather     Osteoporosis Paternal Grandmother     Heart Disease Paternal Grandfather     Cancer - colorectal No family hx of     Prostate Cancer No family hx of     Cerebrovascular Disease No family hx of     Diabetes No family hx of     Asthma No family hx of          Current Outpatient Medications   Medication Sig Dispense Refill    busPIRone (BUSPAR) 10 MG tablet Take 1 tablet (10 mg) by mouth 2 times daily 60 tablet 2    clobetasol (TEMOVATE) 0.05 % external ointment Apply sparingly twice daily for next 1-2 weeks. 30 g 3    cyclobenzaprine (FLEXERIL) 5 MG tablet Take 1 tablet (5 mg) by mouth daily as needed for muscle spasms 30 tablet 1    diltiazem 2% 2% OINT ointment Apply 1 g topically 2 times daily as needed (rectal spasms) 30 g 0    estradiol (ESTRACE) 0.1 MG/GM vaginal cream Place 2 g vaginally twice a week (Patient taking differently: Place vaginally twice a week) 42.5 g 3    fluticasone (FLONASE) 50 MCG/ACT nasal spray Use 1 spray(s) in each nostril once daily 48 g 1    hydrocortisone (ANUSOL-HC) 2.5 % cream Place rectally 2 times daily 28.35 g 1    LORazepam (ATIVAN) 0.5 MG tablet Take 1 tablet (0.5 mg) by mouth every 6 hours as needed for anxiety 15 tablet 0    tacrolimus (PROTOPIC) 0.1 % external ointment Apply topically 2 times daily 60 g 8    tretinoin (RETIN-A) 0.025 % external gel APPLY EXTERNALLY AT BEDTIME 45 g 7     Allergies   Allergen Reactions    Compazine Fatigue     Neurological s/s-can't wake up    Ancef [Cefazolin Sodium] Hives     Recent Labs   Lab Test 09/30/22  0938 03/12/21  1108 10/17/19  1102 11/01/18  1848 03/06/18  1910 10/24/16  1214  "  * 108* 101*  --   --   --    HDL 79 105 82  --   --   --    TRIG 54 44 62  --   --   --    ALT  --   --  18 21  --   --    CR  --   --  0.76 0.88   < > 0.84   GFRESTIMATED  --   --  >90 68   < > 73   GFRESTBLACK  --   --  >90 82   < > 88   POTASSIUM  --   --  3.6 3.6   < > 4.1   TSH  --  1.45  --   --   --  1.71    < > = values in this interval not displayed.          Review of Systems  Constitutional, HEENT, cardiovascular, pulmonary, GI, , musculoskeletal, neuro, skin, endocrine and psych systems are negative, except as otherwise noted.     Objective    Exam  /80 (BP Location: Right arm, Patient Position: Sitting, Cuff Size: Adult Regular)   Pulse 77   Temp 97.3  F (36.3  C) (Tympanic)   Resp 16   Ht 1.721 m (5' 7.75\")   Wt 66 kg (145 lb 6.4 oz)   LMP 02/25/2018   SpO2 98%   BMI 22.27 kg/m     Estimated body mass index is 22.27 kg/m  as calculated from the following:    Height as of this encounter: 1.721 m (5' 7.75\").    Weight as of this encounter: 66 kg (145 lb 6.4 oz).    Physical Exam  GENERAL: alert and no distress  EYES: Eyes grossly normal to inspection, PERRL and conjunctivae and sclerae normal  HENT: ear canals and TM's normal, nose and mouth without ulcers or lesions  NECK: no adenopathy, no asymmetry, masses, or scars  RESP: lungs clear to auscultation - no rales, rhonchi or wheezes  CV: regular rate and rhythm, normal S1 S2, no S3 or S4, no murmur, click or rub, no peripheral edema  ABDOMEN: soft, nontender, no hepatosplenomegaly, no masses and bowel sounds normal  MS: no gross musculoskeletal defects noted, no edema  SKIN: no suspicious lesions or rashes  NEURO: Normal strength and tone, mentation intact and speech normal  PSYCH: mentation appears normal, affect normal/bright        Signed Electronically by: Catrina Jin DNP    "

## 2024-03-29 ENCOUNTER — THERAPY VISIT (OUTPATIENT)
Dept: PHYSICAL THERAPY | Facility: CLINIC | Age: 54
End: 2024-03-29
Attending: NURSE PRACTITIONER
Payer: COMMERCIAL

## 2024-03-29 DIAGNOSIS — R10.2 PELVIC PAIN IN FEMALE: ICD-10-CM

## 2024-03-29 DIAGNOSIS — K59.4 RECTAL SPHINCTER SPASM: Primary | ICD-10-CM

## 2024-03-29 PROCEDURE — 97110 THERAPEUTIC EXERCISES: CPT | Mod: GP | Performed by: PHYSICAL THERAPIST

## 2024-03-29 PROCEDURE — 97140 MANUAL THERAPY 1/> REGIONS: CPT | Mod: GP | Performed by: PHYSICAL THERAPIST

## 2024-04-05 ENCOUNTER — THERAPY VISIT (OUTPATIENT)
Dept: PHYSICAL THERAPY | Facility: CLINIC | Age: 54
End: 2024-04-05
Attending: NURSE PRACTITIONER
Payer: COMMERCIAL

## 2024-04-05 DIAGNOSIS — K59.4 RECTAL SPHINCTER SPASM: Primary | ICD-10-CM

## 2024-04-05 DIAGNOSIS — R10.2 PELVIC PAIN IN FEMALE: ICD-10-CM

## 2024-04-05 PROCEDURE — 97140 MANUAL THERAPY 1/> REGIONS: CPT | Mod: GP | Performed by: PHYSICAL THERAPIST

## 2024-04-05 PROCEDURE — 97110 THERAPEUTIC EXERCISES: CPT | Mod: GP | Performed by: PHYSICAL THERAPIST

## 2024-04-09 DIAGNOSIS — L30.9 CHRONIC DERMATITIS OF HANDS: ICD-10-CM

## 2024-04-09 DIAGNOSIS — L30.1 DYSHIDROTIC ECZEMA: ICD-10-CM

## 2024-04-09 RX ORDER — TACROLIMUS 1 MG/G
OINTMENT TOPICAL 2 TIMES DAILY
Qty: 60 G | Refills: 8 | Status: SHIPPED | OUTPATIENT
Start: 2024-04-09

## 2024-04-09 NOTE — TELEPHONE ENCOUNTER
Requested Prescriptions   Pending Prescriptions Disp Refills    tacrolimus (PROTOPIC) 0.1 % external ointment 60 g 8     Sig: Apply topically 2 times daily       There is no refill protocol information for this order        Last Written Prescription Date:  12/20/22  Last Fill Quantity: 60 g,  # refills: 8   Last office visit: 10/19/2023 ; last virtual visit: Visit date not found with prescribing provider:     Future Office Visit:   Next 5 appointments (look out 90 days)      Apr 17, 2024  9:00 AM  Provider Visit with Catrina Jin DNP  [facility] ([facility] )  Arrive at: [facility] [address] [facility] [id number] [phone]             10/18/24 with Luna Lepe MA

## 2024-04-17 ENCOUNTER — VIRTUAL VISIT (OUTPATIENT)
Dept: FAMILY MEDICINE | Facility: CLINIC | Age: 54
End: 2024-04-17
Payer: COMMERCIAL

## 2024-04-17 DIAGNOSIS — F41.8 SITUATIONAL ANXIETY: ICD-10-CM

## 2024-04-17 DIAGNOSIS — F41.0 PANIC ATTACK: ICD-10-CM

## 2024-04-17 DIAGNOSIS — F41.1 GAD (GENERALIZED ANXIETY DISORDER): Primary | ICD-10-CM

## 2024-04-17 DIAGNOSIS — N95.1 MENOPAUSAL SYMPTOMS: ICD-10-CM

## 2024-04-17 DIAGNOSIS — K59.4 RECTAL SPHINCTER SPASM: ICD-10-CM

## 2024-04-17 PROBLEM — F43.20 GRIEF REACTION: Status: RESOLVED | Noted: 2017-08-17 | Resolved: 2024-04-17

## 2024-04-17 PROBLEM — F43.21 GRIEF REACTION: Status: RESOLVED | Noted: 2017-08-17 | Resolved: 2024-04-17

## 2024-04-17 PROCEDURE — 98966 PH1 ASSMT&MGMT NQHP 5-10: CPT | Performed by: NURSE PRACTITIONER

## 2024-04-17 RX ORDER — BUSPIRONE HYDROCHLORIDE 10 MG/1
10 TABLET ORAL 2 TIMES DAILY
Qty: 60 TABLET | Refills: 11 | Status: SHIPPED | OUTPATIENT
Start: 2024-04-17 | End: 2024-06-21

## 2024-04-17 RX ORDER — CYCLOBENZAPRINE HCL 5 MG
5 TABLET ORAL
Qty: 30 TABLET | Refills: 3 | Status: SHIPPED | OUTPATIENT
Start: 2024-04-17

## 2024-04-17 ASSESSMENT — ENCOUNTER SYMPTOMS: NERVOUS/ANXIOUS: 1

## 2024-04-17 ASSESSMENT — ANXIETY QUESTIONNAIRES
7. FEELING AFRAID AS IF SOMETHING AWFUL MIGHT HAPPEN: NOT AT ALL
4. TROUBLE RELAXING: SEVERAL DAYS
6. BECOMING EASILY ANNOYED OR IRRITABLE: SEVERAL DAYS
7. FEELING AFRAID AS IF SOMETHING AWFUL MIGHT HAPPEN: NOT AT ALL
5. BEING SO RESTLESS THAT IT IS HARD TO SIT STILL: NOT AT ALL
2. NOT BEING ABLE TO STOP OR CONTROL WORRYING: SEVERAL DAYS
IF YOU CHECKED OFF ANY PROBLEMS ON THIS QUESTIONNAIRE, HOW DIFFICULT HAVE THESE PROBLEMS MADE IT FOR YOU TO DO YOUR WORK, TAKE CARE OF THINGS AT HOME, OR GET ALONG WITH OTHER PEOPLE: NOT DIFFICULT AT ALL
8. IF YOU CHECKED OFF ANY PROBLEMS, HOW DIFFICULT HAVE THESE MADE IT FOR YOU TO DO YOUR WORK, TAKE CARE OF THINGS AT HOME, OR GET ALONG WITH OTHER PEOPLE?: NOT DIFFICULT AT ALL
3. WORRYING TOO MUCH ABOUT DIFFERENT THINGS: NOT AT ALL
GAD7 TOTAL SCORE: 4
1. FEELING NERVOUS, ANXIOUS, OR ON EDGE: SEVERAL DAYS
GAD7 TOTAL SCORE: 4

## 2024-04-17 NOTE — PROGRESS NOTES
Luna is a 53 year old who is being evaluated via a billable telephone visit.    What phone number would you like to be contacted at? [phone]  How would you like to obtain your AVS? Drew  Originating Location (pt. Location): Home    Distant Location (provider location):  On-site    Assessment & Plan     CARIDAD (generalized anxiety disorder)  Improved - using Buspar at night only for now and is helpful.  Rare use of Lorazepam - only for panic attacks/episodes    - Adult Mental Health  Referral    Situational anxiety     - Adult Mental Health  Referral    Panic attack  Improved.    - Adult Mental Health  Referral    Rectal sphincter spasm  Improved - doing pelvic floor therapy   Has Diltiazem on hand topical for attacks/acute episodes    - cyclobenzaprine (FLEXERIL) 5 MG tablet  Dispense: 30 tablet; Refill: 3    Menopausal symptoms     - busPIRone (BUSPAR) 10 MG tablet  Dispense: 60 tablet; Refill: 11           See Patient Instructions    Subjective   Luna is a 53 year old, presenting for the following health issues:  Anxiety      4/17/2024     8:01 AM   Additional Questions   Roomed by Migel LABOY CMA   Accompanied by self     Anxiety    History of Present Illness       Mental Health Follow-up:  Patient presents to follow-up on Anxiety.    Patient's anxiety since last visit has been:  Better  The patient is not having other symptoms associated with anxiety.  Any significant life events: grief or loss  Patient is not feeling anxious or having panic attacks.  Patient has no concerns about alcohol or drug use.    She eats 2-3 servings of fruits and vegetables daily.She consumes 0 sweetened beverage(s) daily.She exercises with enough effort to increase her heart rate 20 to 29 minutes per day.  She exercises with enough effort to increase her heart rate 7 days per week.   She is taking medications regularly.     Working on pelvic floor therapy and weekly chiropractic  Was having spasms  after first session of pelvic floor therapy - thinks related to anxiety/muscle spasms.    Started Buspar after last visit 3/19/24 10 mg 1-2 x daily.  Using Lorazepam as needed - sparingly.    Has prescription for Flexeril/Diltiazem topical for rectal spasms - this has helped.  Also with breathing exercise.  Used Diltiazem at bedtime - but had some side effects.  Using Flexeril at bedtime - prior to work and to help with muscle relaxation      Review of Systems  Constitutional, HEENT, cardiovascular, pulmonary, GI, , musculoskeletal, neuro, skin, endocrine and psych systems are negative, except as otherwise noted.      Objective    Vitals - Patient Reported  Pain Score: No Pain (0)        Physical Exam   General: Alert and no distress //Respiratory: No audible wheeze, cough, or shortness of breath // Psychiatric:  Appropriate affect, tone, and pace of words           Phone call duration: 8 minutes  Signed Electronically by: Catrina Jin DNP

## 2024-05-06 ENCOUNTER — MYC MEDICAL ADVICE (OUTPATIENT)
Dept: FAMILY MEDICINE | Facility: CLINIC | Age: 54
End: 2024-05-06
Payer: COMMERCIAL

## 2024-05-06 ENCOUNTER — HOSPITAL ENCOUNTER (EMERGENCY)
Facility: CLINIC | Age: 54
Discharge: HOME | End: 2024-05-06
Attending: FAMILY MEDICINE | Admitting: FAMILY MEDICINE
Payer: COMMERCIAL

## 2024-05-06 ENCOUNTER — ORDERS ONLY (AUTO-RELEASED) (OUTPATIENT)
Dept: EMERGENCY MEDICINE | Facility: CLINIC | Age: 54
End: 2024-05-06
Payer: COMMERCIAL

## 2024-05-06 ENCOUNTER — NURSE TRIAGE (OUTPATIENT)
Dept: FAMILY MEDICINE | Facility: CLINIC | Age: 54
End: 2024-05-06
Payer: COMMERCIAL

## 2024-05-06 VITALS
HEIGHT: 68 IN | RESPIRATION RATE: 16 BRPM | DIASTOLIC BLOOD PRESSURE: 74 MMHG | TEMPERATURE: 97 F | SYSTOLIC BLOOD PRESSURE: 122 MMHG | HEART RATE: 83 BPM | OXYGEN SATURATION: 96 % | BODY MASS INDEX: 21.98 KG/M2 | WEIGHT: 145 LBS

## 2024-05-06 DIAGNOSIS — R00.2 PALPITATIONS: ICD-10-CM

## 2024-05-06 LAB
ALBUMIN SERPL BCG-MCNC: 4.4 G/DL (ref 3.5–5.2)
ALP SERPL-CCNC: 55 U/L (ref 40–150)
ALT SERPL W P-5'-P-CCNC: 30 U/L (ref 0–50)
ANION GAP SERPL CALCULATED.3IONS-SCNC: 12 MMOL/L (ref 7–15)
AST SERPL W P-5'-P-CCNC: 28 U/L (ref 0–45)
BASOPHILS # BLD AUTO: 0 10E3/UL (ref 0–0.2)
BASOPHILS NFR BLD AUTO: 1 %
BILIRUB SERPL-MCNC: 0.4 MG/DL
BUN SERPL-MCNC: 13.9 MG/DL (ref 6–20)
CALCIUM SERPL-MCNC: 9.6 MG/DL (ref 8.6–10)
CHLORIDE SERPL-SCNC: 105 MMOL/L (ref 98–107)
CREAT SERPL-MCNC: 0.96 MG/DL (ref 0.51–0.95)
DEPRECATED HCO3 PLAS-SCNC: 26 MMOL/L (ref 22–29)
EGFRCR SERPLBLD CKD-EPI 2021: 70 ML/MIN/1.73M2
EOSINOPHIL # BLD AUTO: 0.1 10E3/UL (ref 0–0.7)
EOSINOPHIL NFR BLD AUTO: 1 %
ERYTHROCYTE [DISTWIDTH] IN BLOOD BY AUTOMATED COUNT: 13.1 % (ref 10–15)
GLUCOSE SERPL-MCNC: 105 MG/DL (ref 70–99)
HCT VFR BLD AUTO: 40.1 % (ref 35–47)
HGB BLD-MCNC: 13.7 G/DL (ref 11.7–15.7)
IMM GRANULOCYTES # BLD: 0 10E3/UL
IMM GRANULOCYTES NFR BLD: 0 %
LYMPHOCYTES # BLD AUTO: 1.7 10E3/UL (ref 0.8–5.3)
LYMPHOCYTES NFR BLD AUTO: 26 %
MCH RBC QN AUTO: 30.7 PG (ref 26.5–33)
MCHC RBC AUTO-ENTMCNC: 34.2 G/DL (ref 31.5–36.5)
MCV RBC AUTO: 90 FL (ref 78–100)
MONOCYTES # BLD AUTO: 0.5 10E3/UL (ref 0–1.3)
MONOCYTES NFR BLD AUTO: 7 %
NEUTROPHILS # BLD AUTO: 4.3 10E3/UL (ref 1.6–8.3)
NEUTROPHILS NFR BLD AUTO: 65 %
NRBC # BLD AUTO: 0 10E3/UL
NRBC BLD AUTO-RTO: 0 /100
PLATELET # BLD AUTO: 255 10E3/UL (ref 150–450)
POTASSIUM SERPL-SCNC: 4 MMOL/L (ref 3.4–5.3)
PROT SERPL-MCNC: 6.8 G/DL (ref 6.4–8.3)
RBC # BLD AUTO: 4.46 10E6/UL (ref 3.8–5.2)
SODIUM SERPL-SCNC: 143 MMOL/L (ref 135–145)
TROPONIN T SERPL HS-MCNC: <6 NG/L
TSH SERPL DL<=0.005 MIU/L-ACNC: 1.89 UIU/ML (ref 0.3–4.2)
WBC # BLD AUTO: 6.5 10E3/UL (ref 4–11)

## 2024-05-06 PROCEDURE — 99284 EMERGENCY DEPT VISIT MOD MDM: CPT | Mod: 25 | Performed by: FAMILY MEDICINE

## 2024-05-06 PROCEDURE — 82040 ASSAY OF SERUM ALBUMIN: CPT | Performed by: FAMILY MEDICINE

## 2024-05-06 PROCEDURE — 93005 ELECTROCARDIOGRAM TRACING: CPT

## 2024-05-06 PROCEDURE — 84443 ASSAY THYROID STIM HORMONE: CPT | Performed by: FAMILY MEDICINE

## 2024-05-06 PROCEDURE — 99284 EMERGENCY DEPT VISIT MOD MDM: CPT

## 2024-05-06 PROCEDURE — 84484 ASSAY OF TROPONIN QUANT: CPT | Performed by: FAMILY MEDICINE

## 2024-05-06 PROCEDURE — 93010 ELECTROCARDIOGRAM REPORT: CPT | Performed by: FAMILY MEDICINE

## 2024-05-06 PROCEDURE — 85025 COMPLETE CBC W/AUTO DIFF WBC: CPT | Performed by: FAMILY MEDICINE

## 2024-05-06 PROCEDURE — 36415 COLL VENOUS BLD VENIPUNCTURE: CPT | Performed by: FAMILY MEDICINE

## 2024-05-06 ASSESSMENT — COLUMBIA-SUICIDE SEVERITY RATING SCALE - C-SSRS
2. HAVE YOU ACTUALLY HAD ANY THOUGHTS OF KILLING YOURSELF IN THE PAST MONTH?: NO
1. IN THE PAST MONTH, HAVE YOU WISHED YOU WERE DEAD OR WISHED YOU COULD GO TO SLEEP AND NOT WAKE UP?: NO
6. HAVE YOU EVER DONE ANYTHING, STARTED TO DO ANYTHING, OR PREPARED TO DO ANYTHING TO END YOUR LIFE?: NO

## 2024-05-06 ASSESSMENT — ACTIVITIES OF DAILY LIVING (ADL)
ADLS_ACUITY_SCORE: 38

## 2024-05-06 NOTE — DISCHARGE INSTRUCTIONS
Push fluids, rest.  Zio patch monitor with results going to your primary care provider.  You will need to make an appoint with your primary care provider to review results and discuss any changes to your medications.  Return to the emergency department if worse or changes.

## 2024-05-06 NOTE — ED PROVIDER NOTES
History     Chief Complaint   Patient presents with    Palpitations     Pt reports she was having a weird heart beat this morning, pt reports this morning her heart rate was up into the 110. Pt reports heart rate feels irregular. Pt denies chest pain at this time. Pt reports a little bit of shortness of breath     Shortness of Breath     HPI  Luna Rivera is a 53 year old female, past medical history is significant for rectal sphincter spasm, pelvic pain, irritable bowel syndrome with both constipation and diarrhea, panic attack, chronic hand dermatitis, vaginal atrophy, situational anxiety, GERD, acne, generalized anxiety disorder allergic rhinitis, presents today with concerns of fast heartbeat around 100 to 110 bpm this morning for no obvious reason.  Felt a bit irregular, no chest pain or tightness.  Felt a little short of breath.  Her sister who is a nurse advised her on a some type of maneuver to lower her heart rate that got down into the 70s and 80s.  She still felt like her heart was pounding and she had brain fog.  Last episode like this was about a week ago.  The patient is concerned that her symptoms might be due to the BuSpar as well as Flexeril that she takes for pelvic floor muscle spasms.  She contacted her primary care clinic and they were not able to get her a visit today and so she was advised to come to the emergency department.  The patient believes that she is well-hydrated eating and drinking normally.  No recent infections.      Allergies:  Allergies   Allergen Reactions    Compazine Fatigue     Neurological s/s-can't wake up    Ancef [Cefazolin Sodium] Hives       Problem List:    Patient Active Problem List    Diagnosis Date Noted    Rectal sphincter spasm 03/14/2024     Priority: Medium    Pelvic pain in female 03/14/2024     Priority: Medium    Irritable bowel syndrome with both constipation and diarrhea 02/20/2024     Priority: Medium    Panic attack 02/20/2024     Priority: Medium     Chronic dermatitis of hands 09/30/2022     Priority: Medium    Vaginal atrophy 09/30/2022     Priority: Medium    Menopausal symptoms 03/12/2021     Priority: Medium    S/P TRACI (total abdominal hysterectomy) 03/27/2018     Priority: Medium    Situational anxiety 08/17/2017     Priority: Medium    Gastroesophageal reflux disease without esophagitis 10/24/2016     Priority: Medium    Acne 10/05/2011     Priority: Medium    CARDIOVASCULAR SCREENING; LDL GOAL LESS THAN 160 10/31/2010     Priority: Medium    Family history of breast cancer in mother 04/01/2009     Priority: Medium    CARIDAD (generalized anxiety disorder) 09/20/2007     Priority: Medium     Problem list name updated by automated process. Provider to review      Allergic rhinitis 08/31/2005     Priority: Medium     Has had really bad allergies and cannot sleep at night because tichty and scratchy throat and ears. suring the morning she has blocked congested nose. There is no wheezing or cough. There is sneezing. Usually her symptoms are controlled by calritin but since last two weeks the above aymptoms have been there and are severe.    The symptoms are seasonal and worse at home as comparee d to when she is in the city.    She has tried the sudfed which makes her lightheaded and dizzy              Past Medical History:    Past Medical History:   Diagnosis Date    Cardiomyopathy in other diseases classified elsewhere 2002    Contact dermatitis and other eczema, due to unspecified cause     Enteritis due to [place] virus 12/2002    Herpes simplex without mention of complication     Intramural and subserous leiomyoma of uterus 03/19/2018    Irritable bowel syndrome     PONV (postoperative nausea and vomiting)        Past Surgical History:    Past Surgical History:   Procedure Laterality Date    ABDOMEN SURGERY  6/29/2000 2/8/2002    c section    ARTHROSCOPY KNEE WITH MEDIAL MENISCECTOMY  06/20/2014    Procedure: ARTHROSCOPY KNEE WITH MEDIAL MENISCECTOMY;   Surgeon: Alejandro Dodge MD;  Location: [facility]    COLONOSCOPY N/A 2018    Procedure: COLONOSCOPY;  Surgeon: Jose Mora MD;  Location: [facility]    ESOPHAGOSCOPY, GASTROSCOPY, DUODENOSCOPY (EGD), COMBINED N/A 2016    Procedure: COMBINED ESOPHAGOSCOPY, GASTROSCOPY, DUODENOSCOPY (EGD), BIOPSY SINGLE OR MULTIPLE;  Surgeon: Jose Mora MD;  Location: [facility]    ESOPHAGOSCOPY, GASTROSCOPY, DUODENOSCOPY (EGD), COMBINED N/A 2018    Procedure: COMBINED ESOPHAGOSCOPY, GASTROSCOPY, DUODENOSCOPY (EGD);  Surgeon: Jose Mora MD;  Location: [facility]    HC HYSTEROSCOPY, SURGICAL; W/ ENDOMETRIAL ABLATION, ANY METHOD  2010    Novasure ablation    HYSTERECTOMY TOTAL ABDOMINAL, BILATERAL SALPINGO-OOPHORECTOMY, COMBINED Bilateral 2018    TRACI only; tubes removed; ovaries left in    ORTHOPEDIC SURGERY  ;11/15;12/15    meniscus repair; L hip scope; R hip scope    ZZC  DELIVERY ONLY  2002    ZZC LIGATE FALLOPIAN TUBE,POSTPARTUM         Family History:    Family History   Problem Relation Age of Onset    Breast Cancer Mother         age 46    Cancer Mother         Throat, larynx (d/t radiation from breast cancer)tongue cancer 2011    Hypertension Father     Cancer Father         lung CA    Other Cancer Father         Lung    Neurologic Disorder Maternal Grandmother         Parkinson's    C.A.D. Maternal Grandmother     Thyroid Disease Maternal Grandmother         hyperthyroid    Arthritis Maternal Grandfather     Heart Disease Maternal Grandfather     Osteoporosis Paternal Grandmother     Heart Disease Paternal Grandfather     Cancer - colorectal No family hx of     Prostate Cancer No family hx of     Cerebrovascular Disease No family hx of     Diabetes No family hx of     Asthma No family hx of        Social History:  Marital Status:   [2]  Social History     Tobacco Use    Smoking status: Never    Smokeless tobacco: Never   Vaping Use    Vaping status: Never Used  "  Substance Use Topics    Alcohol use: Yes     Comment: rarely    Drug use: Never        Medications:    busPIRone (BUSPAR) 10 MG tablet  clobetasol (TEMOVATE) 0.05 % external ointment  cyclobenzaprine (FLEXERIL) 5 MG tablet  diltiazem 2% 2% OINT ointment  fluticasone (FLONASE) 50 MCG/ACT nasal spray  hydrocortisone (ANUSOL-HC) 2.5 % cream  LORazepam (ATIVAN) 0.5 MG tablet  tacrolimus (PROTOPIC) 0.1 % external ointment  tretinoin (RETIN-A) 0.025 % external gel          Review of Systems   All other systems reviewed and are negative.      Physical Exam   BP: (!) 148/90  Pulse: 98  Temp: 97  F (36.1  C)  Resp: 18  Height: 172.7 cm (5' 8\")  Weight: 65.8 kg (145 lb)  SpO2: 100 %      Physical Exam  Vitals and nursing note reviewed.   Constitutional:       General: She is not in acute distress.     Appearance: She is well-developed and normal weight. She is not ill-appearing.   HENT:      Head: Normocephalic and atraumatic.      Mouth/Throat:      Mouth: Mucous membranes are moist.      Pharynx: Oropharynx is clear.   Eyes:      Extraocular Movements: Extraocular movements intact.      Pupils: Pupils are equal, round, and reactive to light.   Cardiovascular:      Rate and Rhythm: Normal rate and regular rhythm.   Pulmonary:      Effort: Pulmonary effort is normal.      Breath sounds: Normal breath sounds.   Abdominal:      General: Bowel sounds are normal.      Palpations: Abdomen is soft.   Musculoskeletal:         General: Normal range of motion.      Cervical back: Normal range of motion and neck supple.   Skin:     General: Skin is warm and dry.      Capillary Refill: Capillary refill takes less than 2 seconds.   Neurological:      General: No focal deficit present.      Mental Status: She is alert.   Psychiatric:         Mood and Affect: Mood normal.         Behavior: Behavior normal.         ED Course        Procedures              EKG Interpretation:      Interpreted by Jaylen Aldridge MD  Time reviewed: " Time obtained 216 time interpreted same sinus rhythm 80 bpm normal axis, normal intervals no acute ST-T wave changes.  Impression normal EKG    Results for orders placed or performed during the hospital encounter of 05/06/24 (from the past 24 hour(s))   CBC with platelets, differential    Narrative    The following orders were created for panel order CBC with platelets, differential.  Procedure                               Abnormality         Status                     ---------                               -----------         ------                     CBC with platelets and d...[[id number]]                      Final result                 Please view results for these tests on the individual orders.   Comprehensive metabolic panel   Result Value Ref Range    Sodium 143 135 - 145 mmol/L    Potassium 4.0 3.4 - 5.3 mmol/L    Carbon Dioxide (CO2) 26 22 - 29 mmol/L    Anion Gap 12 7 - 15 mmol/L    Urea Nitrogen 13.9 6.0 - 20.0 mg/dL    Creatinine 0.96 (H) 0.51 - 0.95 mg/dL    GFR Estimate 70 >60 mL/min/1.73m2    Calcium 9.6 8.6 - 10.0 mg/dL    Chloride 105 98 - 107 mmol/L    Glucose 105 (H) 70 - 99 mg/dL    Alkaline Phosphatase 55 40 - 150 U/L    AST 28 0 - 45 U/L    ALT 30 0 - 50 U/L    Protein Total 6.8 6.4 - 8.3 g/dL    Albumin 4.4 3.5 - 5.2 g/dL    Bilirubin Total 0.4 <=1.2 mg/dL   TSH with free T4 reflex   Result Value Ref Range    TSH 1.89 0.30 - 4.20 uIU/mL   Troponin T, High Sensitivity   Result Value Ref Range    Troponin T, High Sensitivity <6 <=14 ng/L   CBC with platelets and differential   Result Value Ref Range    WBC Count 6.5 4.0 - 11.0 10e3/uL    RBC Count 4.46 3.80 - 5.20 10e6/uL    Hemoglobin 13.7 11.7 - 15.7 g/dL    Hematocrit 40.1 35.0 - 47.0 %    MCV 90 78 - 100 fL    MCH 30.7 26.5 - 33.0 pg    MCHC 34.2 31.5 - 36.5 g/dL    RDW 13.1 10.0 - 15.0 %    Platelet Count 255 150 - 450 10e3/uL    % Neutrophils 65 %    % Lymphocytes 26 %    % Monocytes 7 %    % Eosinophils 1 %    % Basophils 1 %    %  Immature Granulocytes 0 %    NRBCs per 100 WBC 0 <1 /100    Absolute Neutrophils 4.3 1.6 - 8.3 10e3/uL    Absolute Lymphocytes 1.7 0.8 - 5.3 10e3/uL    Absolute Monocytes 0.5 0.0 - 1.3 10e3/uL    Absolute Eosinophils 0.1 0.0 - 0.7 10e3/uL    Absolute Basophils 0.0 0.0 - 0.2 10e3/uL    Absolute Immature Granulocytes 0.0 <=0.4 10e3/uL    Absolute NRBCs 0.0 10e3/uL   4:35 PM  Reviewed all lab diagnostics as well as EKG.  No significant abnormals.  The patient's creatinine is slightly elevated and of asked her to recheck on this in a couple of weeks with her primary care provider.  No abnormality of BUN.  Mild elevation of glucose which is quite likely physiologic given her ER presentation.  No obvious explanation for palpitations.  I have discussed Zio patch monitor with the patient and placed an order for this, results will need to be reviewed with primary care provider.  Return criteria for the emergency department were discussed.      Medications - No data to display    Assessments & Plan (with Medical Decision Making)   Assessments and plan with medical decision making at the time stamp above.    Disclaimer: This note consists of symbols derived from keyboarding, dictation and/or voice recognition software. As a result, there may be errors in the script that have gone undetected. Please consider this when interpreting information found in this chart.        I have reviewed the nursing notes.    I have reviewed the findings, diagnosis, plan and need for follow up with the patient.        New Prescriptions    No medications on file       Final diagnoses:   Palpitations       5/6/2024   [facility] EMERGENCY DEPT       Jaylen Aldridge MD  05/06/24 [id number]

## 2024-05-06 NOTE — TELEPHONE ENCOUNTER
No clinic appointments are available. Discussed ADS with patient and she was interested. Contacted ADS nurse and there is no appointments available. Called and update patient. Patient aware we could try another ADS location or she should be seen in the Emergency Department. She plans to go to the [place] [facility].      Reason for Disposition   Skipped or extra beat(s) and occurs 4 or more times per minute    Additional Information   Negative: Passed out (i.e., lost consciousness, collapsed and was not responding)   Negative: Shock suspected (e.g., cold/pale/clammy skin, too weak to stand, low BP, rapid pulse)   Negative: Difficult to awaken or acting confused (e.g., disoriented, slurred speech)   Negative: Visible sweat on face or sweat dripping down face   Negative: Unable to walk, or can only walk with assistance (e.g., requires support)   Negative: Received SHOCK from implantable cardiac defibrillator and has persisting symptoms (i.e., palpitations, lightheadedness)   Negative: Dizziness, lightheadedness, or weakness and heart beating very rapidly (e.g., > 140 / minute)   Negative: Dizziness, lightheadedness, or weakness and heart beating very slowly (e.g., < 50 / minute)   Negative: Sounds like a life-threatening emergency to the triager   Negative: Chest pain   Negative: Difficulty breathing   Negative: Dizziness, lightheadedness, or weakness     Not currently   Negative: Heart beating very rapidly (e.g., > 140 / minute) and present now  (Exception: During exercise.)   Negative: Heart beating very slowly (e.g., < 50 / minute)  (Exception: Athlete and heart rate normal for caller.)   Negative: New or worsened shortness of breath with activity (dyspnea on exertion)   Negative: Patient sounds very sick or weak to the triager   Negative: Wearing a 'Holter monitor' or 'cardiac event monitor'   Negative: Received SHOCK from implantable cardiac defibrillator (and now feels well)   Negative: Heart beating  "very rapidly (e.g., > 140 / minute) and not present now  (Exception: During exercise.)   Negative: Skipped or extra beat(s) and increases with exercise or exertion     Patient feels due to medication    Answer Assessment - Initial Assessment Questions  1. DESCRIPTION: \"Please describe your heart rate or heartbeat that you are having\" (e.g., fast/slow, regular/irregular, skipped or extra beats, \"palpitations\")      Patient feels like her heart was racing this morning. Apple watch indicated HR was 110 this morning. Now shows 86. She is unable to feel her pulse to check her HR. She does not feel it is racing now but she feels off. She did try to check HR in neck but difficult to do as irregular.  2. ONSET: \"When did it start?\" (Minutes, hours or days)       1 week ago she had a night of this. She noticed when she took Buspar and Flexeril together. She has not taken them together since. She takes her Buspar and sometimes get a high rate and pounding.   3. DURATION: \"How long does it last\" (e.g., seconds, minutes, hours)      Lasts many hours.  4. PATTERN \"Does it come and go, or has it been constant since it started?\"  \"Does it get worse with exertion?\"   \"Are you feeling it now?\"      It usually goes away by the morning but it did not this morning. Lasted 3 hours this morning. She does not feel as if heart is skipping a beat.  5. TAP: \"Using your hand, can you tap out what you are feeling on a chair or table in front of you, so that I can hear?\" (Note: not all patients can do this)        Wanting to use apple watch  6. HEART RATE: \"Can you tell me your heart rate?\" \"How many beats in 15 seconds?\"  (Note: not all patients can do this)        -  7. RECURRENT SYMPTOM: \"Have you ever had this before?\" If Yes, ask: \"When was the last time?\" and \"What happened that time?\"       Issues 20 years ago after giving birth to son. She did alternative chinese medicine. Herbs and acupuncture. She did not start on prescription " "medications.   She   8. CAUSE: \"What do you think is causing the palpitations?\"      Questioning if due to Buspar.   9. CARDIAC HISTORY: \"Do you have any history of heart disease?\" (e.g., heart attack, angina, bypass surgery, angioplasty, arrhythmia)       No history of heart attack. Had post partem cardiomyopathy. No previous chest pain  10. OTHER SYMPTOMS: \"Do you have any other symptoms?\" (e.g., dizziness, chest pain, sweating, difficulty breathing)        Denies chest pain, sweating or difficulty breathing. She was dizzy this morning when heart rate elevated.  11. PREGNANCY: \"Is there any chance you are pregnant?\" \"When was your last menstrual period?\"        na    Protocols used: Heart Rate and Heartbeat [id number]    Sruthi Paris RN    "

## 2024-05-06 NOTE — LETTER
11/4/2022         RE: Luna Rivera  [address] [facility] [address]        Dear Colleague,    Thank you for referring your patient, Luna Rivera, to the [facility]. Please see a copy of my visit note below.    HPI:   Chief complaints: Luna Rivera is a pleasant 52 year old female who presents for evaluation of a red scaly spot on the nose. The spot has been there for over 1 month and will not heal. It is not painful.       PHYSICAL EXAM:    LMP 02/25/2018   Skin exam performed as follows: Type 2 skin. Mood appropriate  Alert and Oriented X 3. Well developed, well nourished in no distress.  General appearance: Normal  Head including face: Normal  Eyes: conjunctiva and lids: Normal  Mouth: Lips, teeth, gums: Normal  Neck: Normal  Cardiovascular: Exam of peripheral vascular system by observation for swelling, varicosities, edema: Normal  Right upper extremity: Normal  Left upper extremity: Normal  Right lower extremity: Normal  Left lower extremity: Normal  Skin: Scalp and body hair: See below    Pink gritty papule on the left nasal side wall x 1  Brown and tan macules and papules on the back    ASSESSMENT/PLAN:     1. Actinic keratosis on the left nasal side wall x 1. As precancerous, cryosurgery performed. Advised on blistering and post-op care. Advised if not resolved in 1-2 months to return for evaluation  2. Nevi, lentigos on the back - advised benign no treatment needed          Follow-up: regular FSE/PRN sooner  CC:   Scribed By: Rosalia Milton MS, PAADRIANNA          Again, thank you for allowing me to participate in the care of your patient.        Sincerely,        Rosalia Milton PA-C     06-May-2024 17:44

## 2024-05-06 NOTE — ED TRIAGE NOTES
Pt reports she was having a weird heart beat this morning, pt reports this morning her heart rate was up into the 110. Pt reports heart rate feels irregular. Pt denies chest pain at this time. Pt reports a little bit of shortness of breath

## 2024-05-08 NOTE — TELEPHONE ENCOUNTER
Catrina  How should buspar be titrated. She did not want to follow up with you until after zio patch 7 day is complete but would like to stop buspar now.     Thank you  Jono Paige RN

## 2024-05-08 NOTE — TELEPHONE ENCOUNTER
Can stop the Buspar if causing heart racing.   Follow-up with me - can use same day.  Catrina Jin, DNP

## 2024-05-08 NOTE — TELEPHONE ENCOUNTER
Catrina  Per My Chart pt wants to stop the Buspar due to heart racing. She was seen in ER on 5/6.   She wants to know how to wean Buspar to a stop.  She wants her ER follow up appt with you to be after her Zio PATCH monitor is done.

## 2024-05-09 NOTE — TELEPHONE ENCOUNTER
Please read my previous message - STOP buspar now.  Especially if causing heart racing.    Follow-up with me after zio patch. I will message her when I get results.    Catrina iJn, DNP

## 2024-05-21 PROCEDURE — 93244 EXT ECG>48HR<7D REV&INTERPJ: CPT | Performed by: INTERNAL MEDICINE

## 2024-06-21 ENCOUNTER — OFFICE VISIT (OUTPATIENT)
Dept: FAMILY MEDICINE | Facility: CLINIC | Age: 54
End: 2024-06-21
Payer: COMMERCIAL

## 2024-06-21 VITALS
TEMPERATURE: 98.1 F | WEIGHT: 144 LBS | DIASTOLIC BLOOD PRESSURE: 70 MMHG | OXYGEN SATURATION: 98 % | SYSTOLIC BLOOD PRESSURE: 90 MMHG | RESPIRATION RATE: 12 BRPM | HEART RATE: 90 BPM | HEIGHT: 68 IN | BODY MASS INDEX: 21.82 KG/M2

## 2024-06-21 DIAGNOSIS — A60.00 GENITAL HERPES SIMPLEX, UNSPECIFIED SITE: ICD-10-CM

## 2024-06-21 DIAGNOSIS — F41.1 GAD (GENERALIZED ANXIETY DISORDER): Primary | ICD-10-CM

## 2024-06-21 DIAGNOSIS — F41.0 PANIC ATTACK: ICD-10-CM

## 2024-06-21 DIAGNOSIS — R00.2 PALPITATIONS: ICD-10-CM

## 2024-06-21 DIAGNOSIS — N95.2 VAGINAL ATROPHY: ICD-10-CM

## 2024-06-21 PROCEDURE — G2211 COMPLEX E/M VISIT ADD ON: HCPCS | Performed by: NURSE PRACTITIONER

## 2024-06-21 PROCEDURE — 99214 OFFICE O/P EST MOD 30 MIN: CPT | Performed by: NURSE PRACTITIONER

## 2024-06-21 PROCEDURE — 96127 BRIEF EMOTIONAL/BEHAV ASSMT: CPT | Performed by: NURSE PRACTITIONER

## 2024-06-21 RX ORDER — ESTRADIOL 0.1 MG/G
2 CREAM VAGINAL
Qty: 42.5 G | Refills: 3 | Status: SHIPPED | OUTPATIENT
Start: 2024-06-24

## 2024-06-21 RX ORDER — HYDROXYZINE HYDROCHLORIDE 10 MG/1
10-20 TABLET, FILM COATED ORAL EVERY 8 HOURS PRN
Qty: 30 TABLET | Refills: 1 | Status: SHIPPED | OUTPATIENT
Start: 2024-06-21 | End: 2024-09-18

## 2024-06-21 RX ORDER — ACYCLOVIR 400 MG/1
400 TABLET ORAL 2 TIMES DAILY PRN
Qty: 90 TABLET | Refills: 3 | Status: SHIPPED | OUTPATIENT
Start: 2024-06-21

## 2024-06-21 ASSESSMENT — PAIN SCALES - GENERAL: PAINLEVEL: NO PAIN (0)

## 2024-06-21 NOTE — PROGRESS NOTES
Assessment & Plan     (F41.1) CRAIDAD (generalized anxiety disorder)  (primary encounter diagnosis)  Comment:    Plan: hydrOXYzine HCl (ATARAX) 10 MG tablet, Adult         Mental Health  Referral             The risks, benefits and treatment options of prescribed medications or other treatments have been discussed with the patient. The patient verbalized their understanding and should call or follow up if no improvement or if they develop further problems.  Stop buspar due to side effects.  Recommended counseling - referral placed.    (R00.2) Palpitations  Comment:    Plan: hydrOXYzine HCl (ATARAX) 10 MG tablet          Resolved since stopping Buspar    (F41.0) Panic attack  Comment:    Plan: Adult Mental Health  Referral             (A60.00) Genital herpes simplex, unspecified site  Comment:    Plan: acyclovir (ZOVIRAX) 400 MG tablet           Discussed use for recurrences vs daily usage.    (N95.2) Vaginal atrophy  Comment:    Plan: estradiol (ESTRACE) 0.1 MG/GM vaginal cream              The longitudinal plan of care for the diagnosis(es)/condition(s) as documented were addressed during this visit. Due to the added complexity in care, I will continue to support Luna in the subsequent management and with ongoing continuity of care.            MED REC REQUIRED   Post Medication Reconciliation Status:       See Patient Instructions      Subjective   Luna is a 53 year old, presenting for the following health issues:  ER F/U        6/21/2024     8:47 AM   Additional Questions   Roomed by Aysha GONZALES CMA   Accompanied by self         6/21/2024     8:47 AM   Patient Reported Additional Medications   Patient reports taking the following new medications none     History of Present Illness       Reason for visit:  Heart monitor follow up    She eats 2-3 servings of fruits and vegetables daily.She consumes 0 sweetened beverage(s) daily.She exercises with enough effort to increase her heart rate 20 to 29  minutes per day.  She exercises with enough effort to increase her heart rate 4 days per week.   She is taking medications regularly.     Zio patch results:  6 SVT runs, longest ~11 seconds.  Occasional PVCs (~2%).   Patient activations correlated with PVCs.    ED/UC Followup:    Facility:  [place] [facility]  Date of visit: 5/6/24  Reason for visit: heart palpitations  Current Status: here for follow up  Zio patch test results.    Patient reports she has had no heart symptoms since she discontinued Buspar.    Anxiety   How are you doing with your anxiety since your last visit? No change but situational - work has been stressed.  Are you having other symptoms that might be associated with anxiety? Yes:     Have you had a significant life event? Job Concerns   Are you feeling depressed? No  Do you have any concerns with your use of alcohol or other drugs? No  Has tried and failed Lexapro and Buspar. Stopped due to SE.  Has tried Trazodone for sleep and had SE.    Social History     Tobacco Use    Smoking status: Never    Smokeless tobacco: Never   Vaping Use    Vaping status: Never Used   Substance Use Topics    Alcohol use: Yes     Comment: rarely    Drug use: Never         8/17/2017     3:13 PM 2/20/2024     2:35 PM 4/17/2024     8:01 AM   CARIDAD-7 SCORE   Total Score  5 (mild anxiety) 4 (minimal anxiety)   Total Score 11 5 4         8/17/2017     3:13 PM 2/20/2024     2:34 PM   PHQ   PHQ-9 Total Score 7 3   Q9: Thoughts of better off dead/self-harm past 2 weeks Not at all Not at all         2/20/2024     2:34 PM   Last PHQ-9   1.  Little interest or pleasure in doing things 0   2.  Feeling down, depressed, or hopeless 0   3.  Trouble falling or staying asleep, or sleeping too much 1   4.  Feeling tired or having little energy 1   5.  Poor appetite or overeating 1   6.  Feeling bad about yourself 0   7.  Trouble concentrating 0   8.  Moving slowly or restless 0   Q9: Thoughts of better off dead/self-harm past 2 weeks 0   PHQ-9  "Total Score 3         4/17/2024     8:01 AM   CARIDAD-7    1. Feeling nervous, anxious, or on edge 1   2. Not being able to stop or control worrying 1   3. Worrying too much about different things 0   4. Trouble relaxing 1   5. Being so restless that it is hard to sit still 0   6. Becoming easily annoyed or irritable 1   7. Feeling afraid, as if something awful might happen 0   CARIDAD-7 Total Score 4   If you checked any problems, how difficult have they made it for you to do your work, take care of things at home, or get along with other people? Not difficult at all     How many servings of fruits and vegetables do you eat daily?  2-3  On average, how many sweetened beverages do you drink each day (Examples: soda, juice, sweet tea, etc.  Do NOT count diet or artificially sweetened beverages)?   0  How many days per week do you exercise enough to make your heart beat faster? 5  How many minutes a day do you exercise enough to make your heart beat faster? 20 - 29  How many days per week do you miss taking your medication? Stopped Buspar due to side effects      Review of Systems  Constitutional, HEENT, cardiovascular, pulmonary, GI, , musculoskeletal, neuro, skin, endocrine and psych systems are negative, except as otherwise noted.      Objective    BP 90/70 (BP Location: Right arm, Patient Position: Sitting, Cuff Size: Adult Regular)   Pulse 90   Temp 98.1  F (36.7  C) (Tympanic)   Resp 12   Ht 1.727 m (5' 8\")   Wt 65.3 kg (144 lb)   LMP 02/25/2018 (Exact Date)   SpO2 98%   BMI 21.90 kg/m    Body mass index is 21.9 kg/m .  Physical Exam   GENERAL: alert and no distress  RESP: lungs clear to auscultation - no rales, rhonchi or wheezes  CV: regular rate and rhythm, normal S1 S2, no S3 or S4, no murmur, click or rub, no peripheral edema   NEURO: Normal strength and tone, mentation intact and speech normal  PSYCH: mentation appears normal, affect normal/bright             Signed Electronically by: Catrina Triplett " Davin, DNP

## 2024-07-22 ENCOUNTER — OFFICE VISIT (OUTPATIENT)
Dept: BEHAVIORAL HEALTH | Facility: CLINIC | Age: 54
End: 2024-07-22
Attending: NURSE PRACTITIONER
Payer: COMMERCIAL

## 2024-07-22 DIAGNOSIS — F41.1 GAD (GENERALIZED ANXIETY DISORDER): ICD-10-CM

## 2024-07-22 PROCEDURE — 90837 PSYTX W PT 60 MINUTES: CPT

## 2024-07-22 ASSESSMENT — PATIENT HEALTH QUESTIONNAIRE - PHQ9
10. IF YOU CHECKED OFF ANY PROBLEMS, HOW DIFFICULT HAVE THESE PROBLEMS MADE IT FOR YOU TO DO YOUR WORK, TAKE CARE OF THINGS AT HOME, OR GET ALONG WITH OTHER PEOPLE: SOMEWHAT DIFFICULT
SUM OF ALL RESPONSES TO PHQ QUESTIONS 1-9: 4
SUM OF ALL RESPONSES TO PHQ QUESTIONS 1-9: 4

## 2024-07-22 ASSESSMENT — COLUMBIA-SUICIDE SEVERITY RATING SCALE - C-SSRS
2. HAVE YOU ACTUALLY HAD ANY THOUGHTS OF KILLING YOURSELF?: NO
1. HAVE YOU WISHED YOU WERE DEAD OR WISHED YOU COULD GO TO SLEEP AND NOT WAKE UP?: NO

## 2024-07-22 NOTE — PROGRESS NOTES
[facility] Primary Care: Integrated Behavioral Health  July 22, 2024    Behavioral Health Clinician Progress Note    Patient Name: Luna Rivera           Service Type:  Individual      Service Location:   Face to Face in Clinic     Session Start Time: 11:00am  Session End Time: 11:58am      Session Length: 53 - 60      Attendees: Patient     Service Modality:  In-person    Visit Activities (Refresh list every visit): [facility] Only    Diagnostic Assessment Date: Will complete in the next few sessions, not completed due to time constraints.    Treatment Plan Review Date: Will complete in the next few sessions, not completed due to time constraints.    See Flowsheets for today's PHQ-9 and CARIDAD-7 results  Previous PHQ-9:       8/17/2017     3:13 PM 2/20/2024     2:34 PM 7/22/2024     9:20 AM   PHQ-9 SCORE   PHQ-9 Total Score MyChart  3 (Minimal depression) 4 (Minimal depression)   PHQ-9 Total Score 7 3 4     Previous CARIDAD-7:       8/17/2017     3:13 PM 2/20/2024     2:35 PM 4/17/2024     8:01 AM   CARIDAD-7 SCORE   Total Score  5 (mild anxiety) 4 (minimal anxiety)   Total Score 11 5 4     DATA  Extended Session (60+ minutes): No  Interactive Complexity: No  Crisis: No  [facility] Patient: No    Treatment Objective(s) Addressed in This Session:  Target Behavior(s): disease management/lifestyle changes grief,relationship struggles and anxiety     Anxiety: will experience a reduction in anxiety, will develop more effective coping skills to manage anxiety symptoms, will develop healthy cognitive patterns and beliefs, and will increase ability to function adaptively  Relationship Problems: will address relationship difficulties in a more adaptive manner  Functional Impairment: will effectively address problems that interfere with adaptive functioning    Current Stressors / Issues:  Pt presented for initial session and discussed life events that have gotten her to this point.  Pt discussed relationships with  others and the struggles she has experienced.  Discuss self care and brainstorm ideas for herself.  Prompt coping skills.  Provided education on ART and other resources.    Progress on Treatment Objective(s) / Homework:  New Objective established this session - PREPARATION (Decided to change - considering how); Intervened by negotiating a change plan and determining options / strategies for behavior change, identifying triggers, exploring social supports, and working towards setting a date to begin behavior change    Motivational Interviewing    MI Intervention: Expressed Empathy/Understanding, Supported Autonomy, Collaboration, Evocation, Permission to raise concern or advise, Open-ended questions, and Reflections: simple and complex     Change Talk Expressed by the Patient: Desire to change Ability to change    Provider Response to Change Talk: A - Affirmed patient's thoughts, decisions, or attempts at behavior change and R - Reflected patient's change talk    Also provided psychoeducation about behavioral health condition, symptoms, and treatment options    Assessments completed prior to visit:  The following assessments were completed by patient for this visit:  PHQ9:       5/2/2008     9:00 AM 4/29/2009     4:30 PM 12/13/2011    10:45 AM 8/17/2017     3:13 PM 2/20/2024     2:34 PM 7/22/2024     9:20 AM   PHQ-9 SCORE   PHQ-9 Total Score 4 0 1      PHQ-9 Total Score MyChart     3 (Minimal depression) 4 (Minimal depression)   PHQ-9 Total Score    7 3 4     GAD7:       12/13/2011    11:06 AM 8/17/2017     3:13 PM 2/20/2024     2:35 PM 4/17/2024     8:01 AM   CARIDAD-7 SCORE   Total Score 0      Total Score   5 (mild anxiety) 4 (minimal anxiety)   Total Score  11 5 4     CAGE-AID:       7/22/2024     9:35 AM   CAGE-AID Total Score   Total Score 0   Total Score MyChart 0 (A total score of 2 or greater is considered clinically significant)     PROMIS 10-Global Health (only subscores and total score):       7/22/2024      9:34 AM   PROMIS-10 Scores Only   Global Mental Health Score 14   Global Physical Health Score 17   PROMIS TOTAL - SUBSCORES 31     [place] Suicide Severity Rating Scale (Lifetime/Recent)      5/6/2024     2:02 PM 7/22/2024    11:54 AM   [place] Suicide Severity Rating (Lifetime/Recent)   Q1 Wished to be Dead (Past Month) 0-->no    Q2 Suicidal Thoughts (Past Month) 0-->no    Q6 Suicide Behavior (Lifetime) 0-->no    Level of Risk per Screen no risks indicated    Q1 Wish to be Dead (Lifetime)  N   Q2 Non-Specific Active Suicidal Thoughts (Lifetime)  N     Care Plan review completed: Yes    Medication Review:  No current psychiatric medications prescribed due to sensitivity to medications.      Medication Compliance:  Yes    Changes in Health Issues:   None reported    Chemical Use Review:   Substance Use: Chemical use reviewed, no active concerns identified      Tobacco Use: No current tobacco use.      Assessment: Current Emotional / Mental Status (status of significant symptoms):  Risk status (Self / Other harm or suicidal ideation)  Patient denies a history of suicidal ideation, suicide attempts, self-injurious behavior, homicidal ideation, homicidal behavior, and and other safety concerns  Patient denies current fears or concerns for personal safety.  Patient denies current or recent suicidal ideation or behaviors.  Patient denies current or recent homicidal ideation or behaviors.  Patient denies current or recent self injurious behavior or ideation.  Patient denies other safety concerns.  A safety and risk management plan has not been developed at this time, however patient was encouraged to call [facility] should there be a change in any of these risk factors.    Appearance:   Appropriate   Eye Contact:   Good   Psychomotor Behavior: Normal   Attitude:   Cooperative   Orientation:   All  Speech   Rate / Production: Emotional   Volume:  Normal   Mood:    Anxious  Depressed   Affect:    Tearful  Worrisome   Thought Content:  Clear   Thought Form:  Coherent  Logical   Insight:    Fair     Diagnoses:  1. CARIDAD (generalized anxiety disorder)    2. Panic attack      Collateral Reports Completed:  Not Applicable    Plan: (Homework, other):  Patient was given information about behavioral services and encouraged to schedule a follow up appointment with the clinic [facility] in 2 weeks.  She was also given information about mental health symptoms and treatment options .  CD Recommendations: No indications of CD issues.     Gudelia Reyez [facility]  7/22/24     Answers submitted by the patient for this visit:  Patient Health Questionnaire (Submitted on 7/22/2024)  If you checked off any problems, how difficult have these problems made it for you to do your work, take care of things at home, or get along with other people?: Somewhat difficult  PHQ9 TOTAL SCORE: 4

## 2024-09-17 DIAGNOSIS — F41.1 GAD (GENERALIZED ANXIETY DISORDER): ICD-10-CM

## 2024-09-17 DIAGNOSIS — R00.2 PALPITATIONS: ICD-10-CM

## 2024-09-18 RX ORDER — HYDROXYZINE HYDROCHLORIDE 10 MG/1
TABLET, FILM COATED ORAL
Qty: 30 TABLET | Refills: 0 | Status: SHIPPED | OUTPATIENT
Start: 2024-09-18

## 2024-10-02 ENCOUNTER — MYC MEDICAL ADVICE (OUTPATIENT)
Dept: FAMILY MEDICINE | Facility: CLINIC | Age: 54
End: 2024-10-02
Payer: COMMERCIAL

## 2024-10-02 DIAGNOSIS — N95.1 MENOPAUSAL SYMPTOMS: Primary | ICD-10-CM

## 2024-10-04 RX ORDER — ESTRADIOL 0.03 MG/D
1 FILM, EXTENDED RELEASE TRANSDERMAL
Qty: 8 PATCH | Refills: 2 | Status: SHIPPED | OUTPATIENT
Start: 2024-10-07

## 2024-10-18 ENCOUNTER — OFFICE VISIT (OUTPATIENT)
Dept: DERMATOLOGY | Facility: CLINIC | Age: 54
End: 2024-10-18
Payer: COMMERCIAL

## 2024-10-18 DIAGNOSIS — L82.1 SEBORRHEIC KERATOSIS: ICD-10-CM

## 2024-10-18 DIAGNOSIS — L81.4 LENTIGO: ICD-10-CM

## 2024-10-18 DIAGNOSIS — L30.1 DYSHIDROTIC ECZEMA: Primary | ICD-10-CM

## 2024-10-18 DIAGNOSIS — D18.01 ANGIOMA OF SKIN: ICD-10-CM

## 2024-10-18 DIAGNOSIS — D18.01 CHERRY ANGIOMA: ICD-10-CM

## 2024-10-18 DIAGNOSIS — L57.0 ACTINIC KERATOSIS: ICD-10-CM

## 2024-10-18 DIAGNOSIS — D22.9 MULTIPLE BENIGN NEVI: ICD-10-CM

## 2024-10-18 PROCEDURE — 17000 DESTRUCT PREMALG LESION: CPT | Performed by: PHYSICIAN ASSISTANT

## 2024-10-18 PROCEDURE — 99213 OFFICE O/P EST LOW 20 MIN: CPT | Mod: 25 | Performed by: PHYSICIAN ASSISTANT

## 2024-10-18 NOTE — PATIENT INSTRUCTIONS
WOUND CARE INSTRUCTIONS   FOR CRYOSURGERY   This area treated with liquid nitrogen should form a blister (areas treated may or may not blister-skin may just turn dark and slough off). You do not need to bandage the area unless a blister forms and breaks (which may be a few days). When the blister breaks, begin daily dressing changes as follows:  1) Clean and dry the area with tap water using clean Q-tip or sterile gauze pad.   2) Apply Polysporin ointment or Bacitracin ointment over entire wound. Do NOT use Neosporin ointment.   3) Cover the wound with a band-aid or sterile non-stick gauze pad and micropore paper tape.   REPEAT THESE INSTRUCTIONS AT LEAST ONCE A DAY UNTIL THE WOUND HAS COMPLETELY HEALED.   It is an old wives tale that a wound heals better when it is exposed to air and allowed to dry out. The wound will heal faster with a better cosmetic result if it is kept moist with ointment and covered with a bandage.   Do not let the wound dry out.   IMPORTANT INFORMATION ON REVERSE SIDE   Supplies Needed:   *Cotton tipped applicators (Q-tips)   *Polysporin ointment or Bacitracin ointment (NOT NEOSPORIN)   *Band-aids, or non stick gauze pads and micropore paper tape   PATIENT INFORMATION   During the healing process you will notice a number of changes. All wounds develop a small halo of redness surrounding the wound. This means healing is occurring. Severe itching with extensive redness usually indicates sensitivity to the ointment or bandage tape used to dress the wound. You should call our office if this develops.   Swelling and/or discoloration around your surgical site is common, particularly when performed around the eye.   All wounds normally drain. The larger the wound the more drainage there will be. After 7-10 days, you will notice the wound beginning to shrink and new skin will begin to grow. The wound is healed when you can see skin has formed over the entire area. A healed wound has a healthy, shiny  look to the surface and is red to dark pink in color to normalize. Wounds may take approximately 4-6 weeks to heal. Larger wounds may take 6-8 weeks. After the wound is healed you may discontinue dressing changes.   You may experience a sensation of tightness as your wound heals. This is normal and will gradually subside.   Your healed wound may be sensitive to temperature changes. This sensitivity improves with time, but if you re having a lot of discomfort, try to avoid temperature extremes.   Patients frequently experience itching after their wound appears to have healed because of the continue healing under the skin. Plain Vaseline will help relieve the itching.                Proper skin care from [facility]:    -Eliminate harsh soaps as they strip the natural oils from the skin, often resulting in dry itchy skin ( i.e. Dial, Zest, Zeenat Spring)  -Use mild soaps such as Cetaphil or Dove Sensitive Skin in the shower. You do not need to use soap on arms, legs, and trunk every time you shower unless visibly soiled.   -Avoid hot or cold showers.  -After showering, lightly dry off and apply moisturizing within 2-3 minutes. This will help trap moisture in the skin.   -Aggressive use of a moisturizer at least 1-2 times a day to the entire body (including -Vanicream, Cetaphil, Aquaphor or Cerave) and moisturize hands after every washing.  -We recommend using moisturizers that come in a tub that needs to be scooped out, not a pump. This has more of an oil base. It will hold moisture in your skin much better than a water base moisturizer. The above recommended are non-pore clogging.      Wear a sunscreen with at least SPF 30 on your face, ears, neck and V of the chest daily. Wear sunscreen on other areas of the body if those areas are exposed to the sun throughout the day. Sunscreens can contain physical and/or chemical blockers. Physical blockers are less likely to clog pores, these include zinc oxide and  titanium dioxide. Reapply every two hour and after swimming.     Sunscreen examples: https://www.ewg.org/sunscreen/    UV radiation  UVA radiation remains constant throughout the day and throughout the year. It is a longer wavelength than UVB and therefore penetrates deeper into the skin leading to immediate and delayed tanning, photoaging, and skin cancer. 70-80% of UVA and UVB radiation occurs between the hours of 10am-2pm.  UVB radiation  UVB radiation causes the most harmful effects and is more significant during the summer months. However, snow and ice can reflect UVB radiation leading to skin damage during the winter months as well. UVB radiation is responsible for tanning, burning, inflammation, delayed erythema (pinkness), pigmentation (brown spots), and skin cancer.     I recommend self monthly full body exams and yearly full body exams with a dermatology provider. If you develop a new or changing lesion please follow up for examination. Most skin cancers are pink and scaly or pink and pearly. However, we do see blue/brown/black skin cancers.  Consider the ABCDEs of melanoma when giving yourself your monthly full body exam ( don't forget the groin, buttocks, feet, toes, etc). A-asymmetry, B-borders, C-color, D-diameter, E-elevation or evolving. If you see any of these changes please follow up in clinic. If you cannot see your back I recommend purchasing a hand held mirror to use with a larger wall mirror.       Checking for Skin Cancer  You can find cancer early by checking your skin each month. There are 3 kinds of skin cancer. They are melanoma, basal cell carcinoma, and squamous cell carcinoma. Doing monthly skin checks is the best way to find new marks or skin changes. Follow the instructions below for checking your skin.   The ABCDEs of checking moles for melanoma   Check your moles or growths for signs of melanoma using ABCDE:   Asymmetry: the sides of the mole or growth don t match  Border: the edges  are ragged, notched, or blurred  Color: the color within the mole or growth varies  Diameter: the mole or growth is larger than 6 mm (size of a pencil eraser)  Evolving: the size, shape, or color of the mole or growth is changing (evolving is not shown in the images below)    Checking for other types of skin cancer  Basal cell carcinoma or squamous cell carcinoma have symptoms such as:     A spot or mole that looks different from all other marks on your skin  Changes in how an area feels, such as itching, tenderness, or pain  Changes in the skin's surface, such as oozing, bleeding, or scaliness  A sore that does not heal  New swelling or redness beyond the border of a mole    Who s at risk?  Anyone can get skin cancer. But you are at greater risk if you have:   Fair skin, light-colored hair, or light-colored eyes  Many moles or abnormal moles on your skin  A history of sunburns from sunlight or tanning beds  A family history of skin cancer  A history of exposure to radiation or chemicals  A weakened immune system  If you have had skin cancer in the past, you are at risk for recurring skin cancer.   How to check your skin  Do your monthly skin checkups in front of a full-length mirror. Check all parts of your body, including your:   Head (ears, face, neck, and scalp)  Torso (front, back, and sides)  Arms (tops, undersides, upper, and lower armpits)  Hands (palms, backs, and fingers, including under the nails)  Buttocks and genitals  Legs (front, back, and sides)  Feet (tops, soles, toes, including under the nails, and between toes)  If you have a lot of moles, take digital photos of them each month. Make sure to take photos both up close and from a distance. These can help you see if any moles change over time.   Most skin changes are not cancer. But if you see any changes in your skin, call your doctor right away. Only he or she can diagnose a problem. If you have skin cancer, seeing your doctor can be the first  step toward getting the treatment that could save your life.   [employer] last reviewed this educational content on 4/1/2019 2000-2020 [employer]. [address] [place] [address], [place] [address]. All rights reserved. This information is not intended as a substitute for professional medical care. Always follow your healthcare professional's instructions.       When should I call my doctor?  If you are worsening or not improving, please, contact us or seek urgent care as noted below.     Who should I call with questions (adults)?    [facility] [phone]  For urgent needs outside of business hours call the [facility] at [phone] and ask for the dermatology resident on call to be paged  If this is a medical emergency and you are unable to reach an ER, Call [phone]      If you need a prescription refill, please contact your pharmacy. Refills are approved or denied by our Physicians during normal business hours, Monday through Fridays  Per office policy, refills will not be granted if you have not been seen within the past year (or sooner depending on your child's condition)

## 2024-10-18 NOTE — LETTER
10/18/2024      Luna Rivera  [id number] [facility] [id number]      Dear Colleague,    Thank you for referring your patient, Luna Rivera, to the [facility]. Please see a copy of my visit note below.    Luna Rivera is a pleasant 53 year old year old female patient here today for skin check and eczema. She notes that eczema flares with eggs and cleaning chemicals. She has noticed a rough are on nose, no painful or bleeding skin lesions.   Patient has no other skin complaints today.  Remainder of the HPI, Meds, PMH, Allergies, FH, and SH was reviewed in chart.    Pertinent Hx:   No personal history of skin cancer.   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     Contact dermatitis and other eczema, due to unspecified cause      Enteritis due to [place] virus 12/2002     Herpes simplex without mention of complication     Last outbreak 1/05     Intramural and subserous leiomyoma of uterus 03/19/2018     Irritable bowel syndrome      PONV (postoperative nausea and vomiting)        Past Surgical History:   Procedure Laterality Date     ABDOMEN SURGERY  6/29/2000 2/8/2002    c section     ARTHROSCOPY KNEE WITH MEDIAL MENISCECTOMY  06/20/2014    Procedure: ARTHROSCOPY KNEE WITH MEDIAL MENISCECTOMY;  Surgeon: Alejandro Dodge MD;  Location: [facility]     COLONOSCOPY N/A 12/20/2018    Procedure: COLONOSCOPY;  Surgeon: Jose Mora MD;  Location: [facility]     ESOPHAGOSCOPY, GASTROSCOPY, DUODENOSCOPY (EGD), COMBINED N/A 12/26/2016    Procedure: COMBINED ESOPHAGOSCOPY, GASTROSCOPY, DUODENOSCOPY (EGD), BIOPSY SINGLE OR MULTIPLE;  Surgeon: Jose Mora MD;  Location: [facility]     ESOPHAGOSCOPY, GASTROSCOPY, DUODENOSCOPY (EGD), COMBINED N/A 12/20/2018    Procedure: COMBINED ESOPHAGOSCOPY, GASTROSCOPY, DUODENOSCOPY (EGD);  Surgeon: Jose Mora MD;  Location: [facility]     HC HYSTEROSCOPY,  SURGICAL; W/ ENDOMETRIAL ABLATION, ANY METHOD  2010    Novasure ablation     HYSTERECTOMY TOTAL ABDOMINAL, BILATERAL SALPINGO-OOPHORECTOMY, COMBINED Bilateral 2018    TRACI only; tubes removed; ovaries left in     ORTHOPEDIC SURGERY  ;11/15;12/15    meniscus repair; L hip scope; R hip scope     ZZC  DELIVERY ONLY  2002     ZZC LIGATE FALLOPIAN TUBE,POSTPARTUM          Family History   Problem Relation Age of Onset     Breast Cancer Mother         age 46     Cancer Mother         Throat, larynx (d/t radiation from breast cancer)tongue cancer 2011     Hypertension Father      Cancer Father         lung CA     Other Cancer Father         Lung     Neurologic Disorder Maternal Grandmother         Parkinson's     C.A.D. Maternal Grandmother      Thyroid Disease Maternal Grandmother         hyperthyroid     Arthritis Maternal Grandfather      Heart Disease Maternal Grandfather      Osteoporosis Paternal Grandmother      Heart Disease Paternal Grandfather      Cancer - colorectal No family hx of      Prostate Cancer No family hx of      Cerebrovascular Disease No family hx of      Diabetes No family hx of      Asthma No family hx of        Social History     Socioeconomic History     Marital status:      Spouse name: Not on file     Number of children: 2     Years of education: Not on file     Highest education level: Not on file   Occupational History     Occupation: Stay at Home Mom     Employer: [employer]     Occupation:  prn   Tobacco Use     Smoking status: Never     Smokeless tobacco: Never   Vaping Use     Vaping status: Never Used   Substance and Sexual Activity     Alcohol use: Yes     Comment: rarely     Drug use: Never     Sexual activity: Yes     Partners: Male     Birth control/protection: Female Surgical     Comment: BTL   Other Topics Concern     Parent/sibling w/ CABG, MI or angioplasty before 65F 55M? No   Social History Narrative    Home with kids                          Social Determinants of Health     Financial Resource Strain: Low Risk  (3/16/2024)    Financial Resource Strain      Within the past 12 months, have you or your family members you live with been unable to get utilities (heat, electricity) when it was really needed?: No   Food Insecurity: Low Risk  (3/16/2024)    Food Insecurity      Within the past 12 months, did you worry that your food would run out before you got money to buy more?: No      Within the past 12 months, did the food you bought just not last and you didn t have money to get more?: No   Transportation Needs: Low Risk  (3/16/2024)    Transportation Needs      Within the past 12 months, has lack of transportation kept you from medical appointments, getting your medicines, non-medical meetings or appointments, work, or from getting things that you need?: No   Physical Activity: Insufficiently Active (3/16/2024)    Exercise Vital Sign      Days of Exercise per Week: 4 days      Minutes of Exercise per Session: 20 min   Stress: Stress Concern Present (3/16/2024)    [employer] [employer] - Occupational Stress Questionnaire      Feeling of Stress : Rather much   Social Connections: Unknown (3/16/2024)    Social Connection and Isolation Panel [NHANES]      Frequency of Communication with Friends and Family: Not on file      Frequency of Social Gatherings with Friends and Family: Twice a week      Attends Jewish Services: Not on file      Active Member of Clubs or Organizations: Not on file      Attends Club or Organization Meetings: Not on file      Marital Status: Not on file   Interpersonal Safety: Low Risk  (3/19/2024)    Interpersonal Safety      Do you feel physically and emotionally safe where you currently live?: Yes      Within the past 12 months, have you been hit, slapped, kicked or otherwise physically hurt by someone?: No      Within the past 12 months, have you been humiliated or emotionally  abused in other ways by your partner or ex-partner?: No   Housing Stability: Low Risk  (3/16/2024)    Housing Stability      Do you have housing? : Yes      Are you worried about losing your housing?: No       Outpatient Encounter Medications as of 10/18/2024   Medication Sig Dispense Refill     acyclovir (ZOVIRAX) 400 MG tablet Take 1 tablet (400 mg) by mouth 2 times daily as needed (herpes outbreak) 90 tablet 3     clobetasol (TEMOVATE) 0.05 % external ointment Apply sparingly twice daily for next 1-2 weeks. 30 g 3     cyclobenzaprine (FLEXERIL) 5 MG tablet Take 1 tablet (5 mg) by mouth nightly as needed for muscle spasms 30 tablet 3     diltiazem 2% 2% OINT ointment Apply 1 g topically 2 times daily as needed (rectal spasms) 30 g 0     estradiol (ESTRACE) 0.1 MG/GM vaginal cream Place 2 g vaginally twice a week 42.5 g 3     estradiol (VIVELLE-DOT) 0.025 MG/24HR bi-weekly patch Place 1 patch over 96 hours onto the skin twice a week. 8 patch 2     fluticasone (FLONASE) 50 MCG/ACT nasal spray Use 1 spray(s) in each nostril once daily 48 g 1     hydrocortisone (ANUSOL-HC) 2.5 % cream Place rectally 2 times daily 28.35 g 1     hydrOXYzine HCl (ATARAX) 10 MG tablet TAKE 1 TO 2 TABLETS BY MOUTH EVERY 8 HOURS AS NEEDED FOR ANXIETY 30 tablet 0     tacrolimus (PROTOPIC) 0.1 % external ointment Apply topically 2 times daily 60 g 8     tretinoin (RETIN-A) 0.025 % external gel APPLY EXTERNALLY AT BEDTIME 45 g 7     No facility-administered encounter medications on file as of 10/18/2024.             O:   NAD, WDWN, Alert & Oriented, Mood & Affect wnl, Vitals stable   Here today alone   LMP 02/25/2018 (Exact Date)    General appearance normal   Vitals stable   Alert, oriented and in no acute distress     Eczematous plaques on hands  Gritty papule on left nasal sidewall x 1  Firm papule with positive dimple sign on left medial thigh and right shin   Stuck on papules and brown macules on trunk and ext   Red papules on  trunk  Brown papules and macules with regular pigment borders and extremities      The remainder of skin exam is normal    Eyes: Conjunctivae/lids:Normal     ENT: Lips,: normal    MSK:Normal    Cardiovascular: peripheral edema none    Pulm: Breathing Normal    Neuro/Psych: Orientation:Alert and Orientedx3 ; Mood/Affect:normal   A/P:  1. dyshidrotic eczema  Flare clobetasol under occlusion for next few days.  Continue to alternate clobetasol twice daily for 1-2 weeks then change to protopic.   Use moisturizers consistently.  2. Actinic keratosis on nasal bridge x 1  LN2:  Treated with LN2 for 5s for 1-2 cycles. Warned risks of blistering, pain, pigment change, scarring, and incomplete resolution.  Advised patient to return if lesions do not completely resolve.  Wound care sheet given.  3. Seborrheic keratosis, lentigo, angioma, benign nevi, dermatofibroma   It was a pleasure speaking to Luna Rivera today.  BENIGN LESIONS DISCUSSED WITH PATIENT:  I discussed the specifics of tumor, prognosis, and genetics of benign lesions.  I explained that treatment of these lesions would be purely cosmetic and not medically neccessary.  I discussed with patient different removal options including excision, cautery and /or laser.      Nature and genetics of benign skin lesions dicussed with patient.  Signs and Symptoms of skin cancer discussed with patient.  ABCDEs of melanoma reviewed with patient.  Patient encouraged to perform monthly skin exams.  UV precautions reviewed with patient.  Risks of non-melanoma skin cancer discussed with patient   Return to clinic in one year or sooner if needed.       Again, thank you for allowing me to participate in the care of your patient.        Sincerely,        Luna Roldan PA-C

## 2024-10-18 NOTE — PROGRESS NOTES
Luna Rivera is a pleasant 53 year old year old female patient here today for skin check and eczema. She notes that eczema flares with eggs and cleaning chemicals. She has noticed a rough are on nose, no painful or bleeding skin lesions.   Patient has no other skin complaints today.  Remainder of the HPI, Meds, PMH, Allergies, FH, and SH was reviewed in chart.    Pertinent Hx:   No personal history of skin cancer.   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    Contact dermatitis and other eczema, due to unspecified cause     Enteritis due to [place] virus 12/2002    Herpes simplex without mention of complication     Last outbreak 1/05    Intramural and subserous leiomyoma of uterus 03/19/2018    Irritable bowel syndrome     PONV (postoperative nausea and vomiting)        Past Surgical History:   Procedure Laterality Date    ABDOMEN SURGERY  6/29/2000 2/8/2002    c section    ARTHROSCOPY KNEE WITH MEDIAL MENISCECTOMY  06/20/2014    Procedure: ARTHROSCOPY KNEE WITH MEDIAL MENISCECTOMY;  Surgeon: Alejandro Dodge MD;  Location: [facility]    COLONOSCOPY N/A 12/20/2018    Procedure: COLONOSCOPY;  Surgeon: Jose Mora MD;  Location: [facility]    ESOPHAGOSCOPY, GASTROSCOPY, DUODENOSCOPY (EGD), COMBINED N/A 12/26/2016    Procedure: COMBINED ESOPHAGOSCOPY, GASTROSCOPY, DUODENOSCOPY (EGD), BIOPSY SINGLE OR MULTIPLE;  Surgeon: Jose Mora MD;  Location: [facility]    ESOPHAGOSCOPY, GASTROSCOPY, DUODENOSCOPY (EGD), COMBINED N/A 12/20/2018    Procedure: COMBINED ESOPHAGOSCOPY, GASTROSCOPY, DUODENOSCOPY (EGD);  Surgeon: Jose Mora MD;  Location: [facility]    HC HYSTEROSCOPY, SURGICAL; W/ ENDOMETRIAL ABLATION, ANY METHOD  03/30/2010    Novasure ablation    HYSTERECTOMY TOTAL ABDOMINAL, BILATERAL SALPINGO-OOPHORECTOMY, COMBINED Bilateral 03/27/2018    TRACI only; tubes removed; ovaries left in    ORTHOPEDIC SURGERY   ;11/15;12/15    meniscus repair; L hip scope; R hip scope    ZZC  DELIVERY ONLY  ,     ZZC LIGATE FALLOPIAN TUBE,POSTPARTUM          Family History   Problem Relation Age of Onset    Breast Cancer Mother         age 46    Cancer Mother         Throat, larynx (d/t radiation from breast cancer)tongue cancer 2011    Hypertension Father     Cancer Father         lung CA    Other Cancer Father         Lung    Neurologic Disorder Maternal Grandmother         Parkinson's    C.A.D. Maternal Grandmother     Thyroid Disease Maternal Grandmother         hyperthyroid    Arthritis Maternal Grandfather     Heart Disease Maternal Grandfather     Osteoporosis Paternal Grandmother     Heart Disease Paternal Grandfather     Cancer - colorectal No family hx of     Prostate Cancer No family hx of     Cerebrovascular Disease No family hx of     Diabetes No family hx of     Asthma No family hx of        Social History     Socioeconomic History    Marital status:      Spouse name: Not on file    Number of children: 2    Years of education: Not on file    Highest education level: Not on file   Occupational History    Occupation: Stay at Home Mom     Employer: [employer]    Occupation:  prn   Tobacco Use    Smoking status: Never    Smokeless tobacco: Never   Vaping Use    Vaping status: Never Used   Substance and Sexual Activity    Alcohol use: Yes     Comment: rarely    Drug use: Never    Sexual activity: Yes     Partners: Male     Birth control/protection: Female Surgical     Comment: BTL   Other Topics Concern    Parent/sibling w/ CABG, MI or angioplasty before 65F 55M? No   Social History Narrative    Home with kids                         Social Determinants of Health     Financial Resource Strain: Low Risk  (3/16/2024)    Financial Resource Strain     Within the past 12 months, have you or your family members you live with been unable to get utilities (heat, electricity) when  it was really needed?: No   Food Insecurity: Low Risk  (3/16/2024)    Food Insecurity     Within the past 12 months, did you worry that your food would run out before you got money to buy more?: No     Within the past 12 months, did the food you bought just not last and you didn t have money to get more?: No   Transportation Needs: Low Risk  (3/16/2024)    Transportation Needs     Within the past 12 months, has lack of transportation kept you from medical appointments, getting your medicines, non-medical meetings or appointments, work, or from getting things that you need?: No   Physical Activity: Insufficiently Active (3/16/2024)    Exercise Vital Sign     Days of Exercise per Week: 4 days     Minutes of Exercise per Session: 20 min   Stress: Stress Concern Present (3/16/2024)    [employer] [employer] [employer] - Occupational Stress Questionnaire     Feeling of Stress : Rather much   Social Connections: Unknown (3/16/2024)    Social Connection and Isolation Panel [NHANES]     Frequency of Communication with Friends and Family: Not on file     Frequency of Social Gatherings with Friends and Family: Twice a week     Attends Mormon Services: Not on file     Active Member of Clubs or Organizations: Not on file     Attends Club or Organization Meetings: Not on file     Marital Status: Not on file   Interpersonal Safety: Low Risk  (3/19/2024)    Interpersonal Safety     Do you feel physically and emotionally safe where you currently live?: Yes     Within the past 12 months, have you been hit, slapped, kicked or otherwise physically hurt by someone?: No     Within the past 12 months, have you been humiliated or emotionally abused in other ways by your partner or ex-partner?: No   Housing Stability: Low Risk  (3/16/2024)    Housing Stability     Do you have housing? : Yes     Are you worried about losing your housing?: No       Outpatient Encounter Medications as of 10/18/2024   Medication Sig Dispense Refill     acyclovir (ZOVIRAX) 400 MG tablet Take 1 tablet (400 mg) by mouth 2 times daily as needed (herpes outbreak) 90 tablet 3    clobetasol (TEMOVATE) 0.05 % external ointment Apply sparingly twice daily for next 1-2 weeks. 30 g 3    cyclobenzaprine (FLEXERIL) 5 MG tablet Take 1 tablet (5 mg) by mouth nightly as needed for muscle spasms 30 tablet 3    diltiazem 2% 2% OINT ointment Apply 1 g topically 2 times daily as needed (rectal spasms) 30 g 0    estradiol (ESTRACE) 0.1 MG/GM vaginal cream Place 2 g vaginally twice a week 42.5 g 3    estradiol (VIVELLE-DOT) 0.025 MG/24HR bi-weekly patch Place 1 patch over 96 hours onto the skin twice a week. 8 patch 2    fluticasone (FLONASE) 50 MCG/ACT nasal spray Use 1 spray(s) in each nostril once daily 48 g 1    hydrocortisone (ANUSOL-HC) 2.5 % cream Place rectally 2 times daily 28.35 g 1    hydrOXYzine HCl (ATARAX) 10 MG tablet TAKE 1 TO 2 TABLETS BY MOUTH EVERY 8 HOURS AS NEEDED FOR ANXIETY 30 tablet 0    tacrolimus (PROTOPIC) 0.1 % external ointment Apply topically 2 times daily 60 g 8    tretinoin (RETIN-A) 0.025 % external gel APPLY EXTERNALLY AT BEDTIME 45 g 7     No facility-administered encounter medications on file as of 10/18/2024.             O:   NAD, WDWN, Alert & Oriented, Mood & Affect wnl, Vitals stable   Here today alone   LMP 02/25/2018 (Exact Date)    General appearance normal   Vitals stable   Alert, oriented and in no acute distress     Eczematous plaques on hands  Gritty papule on left nasal sidewall x 1  Firm papule with positive dimple sign on left medial thigh and right shin   Stuck on papules and brown macules on trunk and ext   Red papules on trunk  Brown papules and macules with regular pigment borders and extremities      The remainder of skin exam is normal    Eyes: Conjunctivae/lids:Normal     ENT: Lips,: normal    MSK:Normal    Cardiovascular: peripheral edema none    Pulm: Breathing Normal    Neuro/Psych: Orientation:Alert and Orientedx3 ;  Mood/Affect:normal   A/P:  1. dyshidrotic eczema  Flare clobetasol under occlusion for next few days.  Continue to alternate clobetasol twice daily for 1-2 weeks then change to protopic.   Use moisturizers consistently.  2. Actinic keratosis on nasal bridge x 1  LN2:  Treated with LN2 for 5s for 1-2 cycles. Warned risks of blistering, pain, pigment change, scarring, and incomplete resolution.  Advised patient to return if lesions do not completely resolve.  Wound care sheet given.  3. Seborrheic keratosis, lentigo, angioma, benign nevi, dermatofibroma   It was a pleasure speaking to Luna Rivera today.  BENIGN LESIONS DISCUSSED WITH PATIENT:  I discussed the specifics of tumor, prognosis, and genetics of benign lesions.  I explained that treatment of these lesions would be purely cosmetic and not medically neccessary.  I discussed with patient different removal options including excision, cautery and /or laser.      Nature and genetics of benign skin lesions dicussed with patient.  Signs and Symptoms of skin cancer discussed with patient.  ABCDEs of melanoma reviewed with patient.  Patient encouraged to perform monthly skin exams.  UV precautions reviewed with patient.  Risks of non-melanoma skin cancer discussed with patient   Return to clinic in one year or sooner if needed.

## 2024-10-30 DIAGNOSIS — F41.1 GAD (GENERALIZED ANXIETY DISORDER): ICD-10-CM

## 2024-10-30 DIAGNOSIS — R00.2 PALPITATIONS: ICD-10-CM

## 2024-10-31 RX ORDER — HYDROXYZINE HYDROCHLORIDE 10 MG/1
TABLET, FILM COATED ORAL
Qty: 30 TABLET | Refills: 0 | Status: SHIPPED | OUTPATIENT
Start: 2024-10-31

## 2024-12-03 DIAGNOSIS — R00.2 PALPITATIONS: ICD-10-CM

## 2024-12-03 DIAGNOSIS — F41.1 GAD (GENERALIZED ANXIETY DISORDER): ICD-10-CM

## 2024-12-04 RX ORDER — HYDROXYZINE HYDROCHLORIDE 10 MG/1
TABLET, FILM COATED ORAL
Qty: 30 TABLET | Refills: 0 | Status: SHIPPED | OUTPATIENT
Start: 2024-12-04

## 2024-12-04 NOTE — TELEPHONE ENCOUNTER
Prescription approved per [facility] Refill Protocol.  Julie Behrendt RN

## 2024-12-18 ENCOUNTER — LAB (OUTPATIENT)
Dept: LAB | Facility: CLINIC | Age: 54
End: 2024-12-18
Payer: COMMERCIAL

## 2024-12-18 ENCOUNTER — E-VISIT (OUTPATIENT)
Dept: URGENT CARE | Facility: CLINIC | Age: 54
End: 2024-12-18
Payer: COMMERCIAL

## 2024-12-18 DIAGNOSIS — N94.9 VAGINAL DISCOMFORT: ICD-10-CM

## 2024-12-18 DIAGNOSIS — N94.9 VAGINAL DISCOMFORT: Primary | ICD-10-CM

## 2024-12-18 LAB
CLUE CELLS: NORMAL
TRICHOMONAS, WET PREP: NORMAL
WBC'S/HIGH POWER FIELD, WET PREP: NORMAL
YEAST, WET PREP: NORMAL

## 2024-12-18 PROCEDURE — 87210 SMEAR WET MOUNT SALINE/INK: CPT

## 2024-12-18 NOTE — PATIENT INSTRUCTIONS
Thank you for choosing us for your care. Given your symptoms, I would like you to do a lab-only visit to determine what is causing them.  I have placed the orders.  Please schedule an appointment with the lab right here in [place], or call [phone].  I will let you know when the results are back and next steps to take.    Schedule a Lab Only appointment here.

## 2024-12-19 DIAGNOSIS — N95.1 MENOPAUSAL SYMPTOMS: ICD-10-CM

## 2024-12-19 RX ORDER — ESTRADIOL 0.03 MG/D
PATCH, EXTENDED RELEASE TRANSDERMAL
Qty: 8 PATCH | Refills: 0 | OUTPATIENT
Start: 2024-12-19

## 2024-12-29 DIAGNOSIS — N95.1 MENOPAUSAL SYMPTOMS: ICD-10-CM

## 2024-12-30 RX ORDER — ESTRADIOL 0.03 MG/D
PATCH, EXTENDED RELEASE TRANSDERMAL
Qty: 8 PATCH | Refills: 2 | Status: SHIPPED | OUTPATIENT
Start: 2024-12-30

## 2025-01-08 ENCOUNTER — E-VISIT (OUTPATIENT)
Dept: URGENT CARE | Facility: CLINIC | Age: 55
End: 2025-01-08
Payer: COMMERCIAL

## 2025-01-08 DIAGNOSIS — R05.1 ACUTE COUGH: ICD-10-CM

## 2025-01-08 DIAGNOSIS — Z20.828 EXPOSURE TO INFLUENZA: Primary | ICD-10-CM

## 2025-01-08 RX ORDER — OSELTAMIVIR PHOSPHATE 75 MG/1
75 CAPSULE ORAL 2 TIMES DAILY
Qty: 10 CAPSULE | Refills: 0 | Status: SHIPPED | OUTPATIENT
Start: 2025-01-08 | End: 2025-01-13

## 2025-01-08 NOTE — PATIENT INSTRUCTIONS
Influenza (Flu): Care Instructions  Overview     Influenza (flu) is an infection in the lungs and breathing passages. It is caused by the influenza virus. There are different strains, or types, of the flu virus from year to year. Unlike the common cold, the flu comes on suddenly and the symptoms can be more severe. These symptoms include a cough, congestion, fever, chills, fatigue, aches, and pains. These symptoms may last for a few weeks. Although the flu can make you feel very sick, it usually doesn't cause serious health problems.  Home treatment is usually all you need for flu symptoms. But your doctor may prescribe antiviral medicine to prevent other health problems, such as pneumonia, from developing. The risk of other health problems from the flu is highest for young children (under 2), older adults (over 65), pregnant women, and people with long-term health conditions.  Follow-up care is a key part of your treatment and safety. Be sure to make and go to all appointments, and call your doctor if you are having problems. It's also a good idea to know your test results and keep a list of the medicines you take.  How can you care for yourself at home?  Get plenty of rest.  Drink plenty of fluids. If you have to limit fluids because of a health problem, talk with your doctor before you increase the amount of fluids you drink.  Take an over-the-counter pain medicine if needed, such as acetaminophen (Tylenol), ibuprofen (Advil, Motrin), or naproxen (Aleve), to relieve fever, headache, and muscle aches. Be safe with medicines. Read and follow all instructions on the label.  No one younger than 20 should take aspirin. It has been linked to Reye syndrome, a serious illness.  Take any prescribed medicine exactly as directed.  Do not smoke. Smoking can make the flu worse. If you need help quitting, talk to your doctor about stop-smoking programs and medicines. These can increase your chances of quitting for good.  If  the skin around your nose and lips becomes sore, put some petroleum jelly (such as Vaseline) on the area.  To ease coughing:  Suck on cough drops or plain, hard candy.  Try an over-the-counter cough or cold medicine. Read and follow all instructions on the label.  Raise your head at night with an extra pillow. This may help you rest if coughing keeps you awake.  To avoid spreading the flu  Wash your hands regularly, and keep your hands away from your face.  Stay home from school, work, and other public places until you are feeling better and your fever has been gone for at least 24 hours. The fever needs to have gone away on its own without the help of medicine.  Ask people living with you to talk to their doctors about preventing the flu. They may get antiviral medicine to keep from getting the flu from you.  To prevent the flu in the future, get the flu vaccine every fall. Encourage people living with you to get the vaccine.  Cover your mouth when you cough or sneeze. If you can, cough or sneeze into the bend of your elbow, not your hands.  When should you call for help?   Call 911 anytime you think you may need emergency care. For example, call if:    You have severe trouble breathing.     You have a seizure.   Call your doctor now or seek immediate medical care if:    You have trouble breathing.     You have a fever with a stiff neck or a severe headache.     You have pain or pressure in your chest or belly.     You have a fever or cough that returns after getting better.     You feel very sleepy, dizzy, or confused.     You are not urinating.     You have severe muscle pain.     You have severe weakness, or you are unsteady.     You have medical conditions that are getting worse.   Watch closely for changes in your health, and be sure to contact your doctor if:    You do not get better as expected.     You are having a problem with your medicine.   Where can you learn more?  Go to  "https://www.[employer].net/patiented  Enter L652 in the search box to learn more about \"Influenza (Flu): Care Instructions.\"  Current as of: April 30, 2024  Content Version: 14.3    2024 [employer].   Care instructions adapted under license by your healthcare professional. If you have questions about a medical condition or this instruction, always ask your healthcare professional. [employer] disclaims any warranty or liability for your use of this information.    Thank you for choosing us for your care. I have placed an order for a prescription so that you can start treatment. View your full visit summary for details by clicking on the link below. Your pharmacist will able to address any questions you may have about the medication.     If you're not feeling better within 5-7 days, please schedule an appointment.  You can schedule an appointment right here in [facility], or call [phone]  If the visit is for the same symptoms as your eVisit, we'll refund the cost of your eVisit if seen within seven days.    "

## 2025-03-10 ENCOUNTER — NURSE TRIAGE (OUTPATIENT)
Dept: FAMILY MEDICINE | Facility: CLINIC | Age: 55
End: 2025-03-10
Payer: COMMERCIAL

## 2025-03-10 NOTE — TELEPHONE ENCOUNTER
Nurse Triage SBAR    Is this a 2nd Level Triage? NO    Situation: Patient accidentally punctured foot with shovel.    Background: Patient was getting out of her hot tub and was barefoot and there was ice that she was going to break with a shovel. She looked up at her  and punctured her foot while trying to break apart the ice. Patient's tetanus was 12/7/2025.    Assessment: Patient reports the puncture wound is not bleeding. It does not look deep enough to have stitches. The puncture is between the 3rd and 4th toes on left foot. She has cleaned would well and does nothing wound needs irrigation or debridement. Patient states she is able to walk on foot fine.    Protocol Recommended Disposition:   Home Care, See More Appropriate Protocol    Recommendation:   WASH THE WOUND:  * Wash dirty or clean puncture wounds with warm water and soap for 15 minutes.  * For any dirt or debris, scrub the wound surface back and forth with a washcloth to remove it.  * If the wound rebleeds a little, that may help remove germs.  ANTIBIOTIC OINTMENT:  * Put a small amount of ANTIBIOTIC OINTMENT on the wound. Cover it with an adhesive bandage (such as a Band-Aid) or a dressing. Change daily or if it becomes wet.  * You can get this over-the-counter (OTC) at a drugstore.  * Use Bacitracin ointment (OTC in [place] or Polysporin ointment (OTC in [place]) or one that you already have.  PAIN MEDICINES:  * For pain relief, you can take either acetaminophen, ibuprofen, or naproxen.  * They are over-the-counter (OTC) pain drugs. You can buy them at the drugstore.  * ACETAMINOPHEN - REGULAR STRENGTH TYLENOL: Take 650 mg (two 325 mg pills) by mouth every 4 to 6 hours as needed. Each Regular Strength Tylenol pill has 325 mg of acetaminophen. The most you should take is 10 pills a day (3,250 mg total). Note: In [place], the maximum is 12 pills a day (3,900 mg total).  * ACETAMINOPHEN - EXTRA STRENGTH TYLENOL: Take 1,000 mg (two 500 mg pills)  every 6 to 8 hours as needed. Each Extra Strength Tylenol pill has 500 mg of acetaminophen. The most you should take is 6 pills a day (3,000 mg total). Note: In [place], the maximum is 8 pills a day (4,000 mg total).  * IBUPROFEN (SUCH AS MOTRIN, ADVIL): Take 400 mg (two 200 mg pills) by mouth every 6 hours. The most you should take is 6 pills a day (1,200 mg total).  * NAPROXEN (SUCH AS ALEVE): Take 220 mg (one 220 mg pill) by mouth every 8 to 12 hours as needed. You may take 440 mg (two 220 mg pills) for your first dose. The most you should take is 3 pills a day (660 mg total). Note: In [place], the maximum is 2 pills a day (one every 12 hours; 440 mg total).  * Use the lowest amount of medicine that makes your pain better.  EXPECTED COURSE:  * Puncture wounds seal over in 1 to 2 hours.  * Pain should resolve within 2 days.  CALL BACK IF:  * Dirt in the wound persists after 15 minutes of scrubbing  * Severe pain lasts over 2 hours after pain medicine  * It begins to look infected (redness, red streaks, tenderness, pus, fever)  * Pain in area of wound lasts longer than 5 days  * You become worse    Reason for Disposition   Minor puncture wound   Puncture wound    Answer Assessment - Initial Assessment Questions  1. LOCATION: Top of left foot, in between 3rd and 4th toes  2. OBJECT: a shovel  3. DEPTH: Unsure, but she does not think she needs stitches  4. ONSET: Yesterday morning  5. PAIN: Mild to moderate  6. TETANUS: 12/07/2017  7. PREGNANCY: NA    Protocols used: Cuts and [id number], Puncture Wound-A-OH    Jaquelin BARRETT RN  [facility]  [phone]

## 2025-03-17 DIAGNOSIS — N95.1 MENOPAUSAL SYMPTOMS: ICD-10-CM

## 2025-03-17 RX ORDER — ESTRADIOL 0.03 MG/D
PATCH, EXTENDED RELEASE TRANSDERMAL
Qty: 8 PATCH | Refills: 1 | Status: SHIPPED | OUTPATIENT
Start: 2025-03-17

## 2025-04-02 ENCOUNTER — HOSPITAL ENCOUNTER (OUTPATIENT)
Dept: MAMMOGRAPHY | Facility: CLINIC | Age: 55
Discharge: HOME | End: 2025-04-02
Attending: NURSE PRACTITIONER
Payer: COMMERCIAL

## 2025-04-02 DIAGNOSIS — Z12.31 VISIT FOR SCREENING MAMMOGRAM: ICD-10-CM

## 2025-04-02 PROCEDURE — 77063 BREAST TOMOSYNTHESIS BI: CPT

## 2025-05-03 DIAGNOSIS — J30.2 SEASONAL ALLERGIC RHINITIS, UNSPECIFIED TRIGGER: ICD-10-CM

## 2025-05-04 ENCOUNTER — HEALTH MAINTENANCE LETTER (OUTPATIENT)
Age: 55
End: 2025-05-04

## 2025-05-05 RX ORDER — FLUTICASONE PROPIONATE 50 MCG
SPRAY, SUSPENSION (ML) NASAL
Qty: 16 G | Refills: 0 | Status: SHIPPED | OUTPATIENT
Start: 2025-05-05

## 2025-05-10 DIAGNOSIS — N95.1 MENOPAUSAL SYMPTOMS: ICD-10-CM

## 2025-05-12 RX ORDER — ESTRADIOL 0.03 MG/D
PATCH, EXTENDED RELEASE TRANSDERMAL
Qty: 8 PATCH | Refills: 0 | Status: SHIPPED | OUTPATIENT
Start: 2025-05-12

## 2025-06-04 ENCOUNTER — MYC MEDICAL ADVICE (OUTPATIENT)
Dept: FAMILY MEDICINE | Facility: CLINIC | Age: 55
End: 2025-06-04
Payer: COMMERCIAL

## 2025-08-25 DIAGNOSIS — N95.1 MENOPAUSAL SYMPTOMS: ICD-10-CM

## 2025-08-25 RX ORDER — ESTRADIOL 0.03 MG/D
PATCH, EXTENDED RELEASE TRANSDERMAL
Qty: 24 PATCH | Refills: 0 | Status: SHIPPED | OUTPATIENT
Start: 2025-08-25

## (undated) DEVICE — GLOVE PROTEXIS W/NEU-THERA 6.5  2D73TE65

## (undated) DEVICE — SOL NACL 0.9% IRRIG 1000ML BOTTLE 07138-09

## (undated) DEVICE — SU VICRYL 0 CT-1 36" J946H

## (undated) DEVICE — SU WND CLOSURE VLOC 90 ABS 3-0 18" P-14 VLOCM0124

## (undated) DEVICE — PACK LAPAROTOMY CUSTOM LAKES

## (undated) DEVICE — SPONGE LAP 18X18" 1515

## (undated) DEVICE — BLADE CLIPPER 4406

## (undated) DEVICE — PAD PERI INDIV WRAP 11" 2022

## (undated) DEVICE — TUBING SUCTION MEDI-VAC 1/4"X20' N620A

## (undated) DEVICE — SUCTION TIP POOLE K770

## (undated) DEVICE — CATH TRAY FOLEY SURESTEP 16FR W/URINE MTR STATLK LF A303416A

## (undated) DEVICE — ESU LIGASURE IMPACT TRIAD LF4200

## (undated) DEVICE — SUCTION TIP YANKAUER STR K87

## (undated) DEVICE — SOL WATER IRRIG 1000ML BOTTLE 07139-09

## (undated) DEVICE — PREP SKIN SCRUB TRAY 4461A

## (undated) DEVICE — STOCKING SLEEVE COMPRESSION CALF MED

## (undated) DEVICE — ADHESIVE SWIFTSET 0.8ML OCTYL SS6

## (undated) DEVICE — BLADE KNIFE SURG 10 371110

## (undated) DEVICE — SU VICRYL 0 CT-1 CR 8X27" UND JJ41G

## (undated) RX ORDER — BUPIVACAINE HYDROCHLORIDE AND EPINEPHRINE 5; 5 MG/ML; UG/ML
INJECTION, SOLUTION EPIDURAL; INTRACAUDAL; PERINEURAL
Status: DISPENSED
Start: 2018-03-27

## (undated) RX ORDER — FENTANYL CITRATE 50 UG/ML
INJECTION, SOLUTION INTRAMUSCULAR; INTRAVENOUS
Status: DISPENSED
Start: 2018-03-27

## (undated) RX ORDER — OXYCODONE HCL 10 MG/1
TABLET, FILM COATED, EXTENDED RELEASE ORAL
Status: DISPENSED
Start: 2018-03-27

## (undated) RX ORDER — SCOLOPAMINE TRANSDERMAL SYSTEM 1 MG/1
PATCH, EXTENDED RELEASE TRANSDERMAL
Status: DISPENSED
Start: 2018-03-27

## (undated) RX ORDER — KETOROLAC TROMETHAMINE 30 MG/ML
INJECTION, SOLUTION INTRAMUSCULAR; INTRAVENOUS
Status: DISPENSED
Start: 2018-03-27

## (undated) RX ORDER — ONDANSETRON 2 MG/ML
INJECTION INTRAMUSCULAR; INTRAVENOUS
Status: DISPENSED
Start: 2018-03-27

## (undated) RX ORDER — PHENYLEPHRINE HCL IN 0.9% NACL 1 MG/10 ML
SYRINGE (ML) INTRAVENOUS
Status: DISPENSED
Start: 2018-03-27

## (undated) RX ORDER — LIDOCAINE HYDROCHLORIDE 10 MG/ML
INJECTION, SOLUTION EPIDURAL; INFILTRATION; INTRACAUDAL; PERINEURAL
Status: DISPENSED
Start: 2018-03-27

## (undated) RX ORDER — PROPOFOL 10 MG/ML
INJECTION, EMULSION INTRAVENOUS
Status: DISPENSED
Start: 2018-03-27

## (undated) RX ORDER — DEXAMETHASONE SODIUM PHOSPHATE 4 MG/ML
INJECTION, SOLUTION INTRA-ARTICULAR; INTRALESIONAL; INTRAMUSCULAR; INTRAVENOUS; SOFT TISSUE
Status: DISPENSED
Start: 2018-03-27

## (undated) RX ORDER — CLINDAMYCIN PHOSPHATE 900 MG/50ML
INJECTION, SOLUTION INTRAVENOUS
Status: DISPENSED
Start: 2018-03-27

## (undated) RX ORDER — HYDROMORPHONE HYDROCHLORIDE 1 MG/ML
INJECTION, SOLUTION INTRAMUSCULAR; INTRAVENOUS; SUBCUTANEOUS
Status: DISPENSED
Start: 2018-03-27

## (undated) RX ORDER — MEPERIDINE HYDROCHLORIDE 25 MG/ML
INJECTION INTRAMUSCULAR; INTRAVENOUS; SUBCUTANEOUS
Status: DISPENSED
Start: 2018-03-27

## (undated) RX ORDER — PHENAZOPYRIDINE HYDROCHLORIDE 200 MG/1
TABLET, FILM COATED ORAL
Status: DISPENSED
Start: 2018-03-27